# Patient Record
Sex: MALE | Race: WHITE | NOT HISPANIC OR LATINO | Employment: FULL TIME | URBAN - METROPOLITAN AREA
[De-identification: names, ages, dates, MRNs, and addresses within clinical notes are randomized per-mention and may not be internally consistent; named-entity substitution may affect disease eponyms.]

---

## 2017-11-09 ENCOUNTER — TREATMENT (OUTPATIENT)
Dept: PHYSICAL THERAPY | Facility: CLINIC | Age: 47
End: 2017-11-09

## 2017-11-09 DIAGNOSIS — Z02.1 PRE-EMPLOYMENT EXAMINATION: Primary | ICD-10-CM

## 2017-11-09 PROCEDURE — PDS: Performed by: PHYSICAL THERAPIST

## 2019-06-04 ENCOUNTER — HOSPITAL ENCOUNTER (OUTPATIENT)
Dept: GENERAL RADIOLOGY | Facility: HOSPITAL | Age: 49
Discharge: HOME | End: 2019-06-04
Admitting: PAIN MEDICINE

## 2019-06-04 ENCOUNTER — TRANSCRIBE ORDERS (OUTPATIENT)
Dept: ADMINISTRATIVE | Facility: HOSPITAL | Age: 49
End: 2019-06-04

## 2019-06-04 DIAGNOSIS — M54.2 CERVICALGIA: Primary | ICD-10-CM

## 2019-06-04 DIAGNOSIS — M54.2 CERVICALGIA: ICD-10-CM

## 2019-06-04 PROCEDURE — 72040 X-RAY EXAM NECK SPINE 2-3 VW: CPT

## 2019-06-05 ENCOUNTER — TREATMENT (OUTPATIENT)
Dept: PHYSICAL THERAPY | Facility: CLINIC | Age: 49
End: 2019-06-05

## 2019-06-05 DIAGNOSIS — M54.2 PAIN, NECK: Primary | ICD-10-CM

## 2019-06-05 DIAGNOSIS — R29.3 POSTURE IMBALANCE: ICD-10-CM

## 2019-06-05 PROCEDURE — 97162 PT EVAL MOD COMPLEX 30 MIN: CPT | Performed by: PHYSICAL THERAPIST

## 2019-06-05 PROCEDURE — 97110 THERAPEUTIC EXERCISES: CPT | Performed by: PHYSICAL THERAPIST
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
## 2019-06-13 ENCOUNTER — TREATMENT (OUTPATIENT)
Dept: PHYSICAL THERAPY | Facility: CLINIC | Age: 49
End: 2019-06-13

## 2019-06-13 DIAGNOSIS — M54.2 PAIN, NECK: Primary | ICD-10-CM

## 2019-06-13 DIAGNOSIS — R29.3 POSTURE IMBALANCE: ICD-10-CM

## 2019-06-13 PROCEDURE — 97035 APP MDLTY 1+ULTRASOUND EA 15: CPT | Performed by: PHYSICAL THERAPIST

## 2019-06-13 PROCEDURE — 97110 THERAPEUTIC EXERCISES: CPT | Performed by: PHYSICAL THERAPIST

## 2019-06-13 NOTE — PROGRESS NOTES
Physical Therapy Daily Progress Note  Visit: 2    Santhosh Pope reports: I felt better after the evaluation. Feeling about the same now    Subjective     Objective   See Exercise, Manual, and Modality Logs for complete treatment.       Assessment & Plan     Assessment  Assessment details: Pt reported relief after session. Added cervical isometrics and side arcs to HEP    Plan  Plan details: Progress with OA nods on ball and cervical rotation next session        Manual Therapy:    -     mins  12788;  Therapeutic Exercise:    29     mins  19472;     Neuromuscular Sarah:    -    mins  57622;    Therapeutic Activity:     -     mins  01124;     Gait Training:      -     mins  94294;     Ultrasound:     10     mins  98626;    Electrical Stimulation:    -     mins  45602 ( );  Dry Needling     -     mins self-pay    Timed Treatment:   39   mins   Total Treatment:     45   mins    Abena Bell PT  [place] License #: [id number]    Physical Therapist

## 2021-03-16 ENCOUNTER — HOSPITAL ENCOUNTER (OUTPATIENT)
Dept: CARDIOLOGY | Facility: HOSPITAL | Age: 51
Discharge: HOME | End: 2021-03-16

## 2021-03-16 VITALS
DIASTOLIC BLOOD PRESSURE: 85 MMHG | HEIGHT: 74 IN | WEIGHT: 195 LBS | OXYGEN SATURATION: 98 % | BODY MASS INDEX: 25.03 KG/M2 | SYSTOLIC BLOOD PRESSURE: 141 MMHG | TEMPERATURE: 97.3 F | HEART RATE: 67 BPM

## 2021-03-16 VITALS
WEIGHT: 195.11 LBS | BODY MASS INDEX: 25.04 KG/M2 | HEART RATE: 67 BPM | DIASTOLIC BLOOD PRESSURE: 85 MMHG | HEIGHT: 74 IN | SYSTOLIC BLOOD PRESSURE: 141 MMHG | OXYGEN SATURATION: 98 %

## 2021-03-16 DIAGNOSIS — R07.89 OTHER CHEST PAIN: Primary | ICD-10-CM

## 2021-03-16 DIAGNOSIS — R06.02 SHORTNESS OF BREATH: ICD-10-CM

## 2021-03-16 DIAGNOSIS — R07.89 OTHER CHEST PAIN: ICD-10-CM

## 2021-03-16 LAB
ALBUMIN SERPL-MCNC: 4.5 G/DL (ref 3.5–5.2)
ALBUMIN/GLOB SERPL: 1.6 G/DL
ALP SERPL-CCNC: 76 U/L (ref 39–117)
ALT SERPL W P-5'-P-CCNC: 25 U/L (ref 1–41)
ANION GAP SERPL CALCULATED.3IONS-SCNC: 8 MMOL/L (ref 5–15)
AORTIC ARCH: 2.8 CM
ASCENDING AORTA: 3.1 CM
AST SERPL-CCNC: 20 U/L (ref 1–40)
BASOPHILS # BLD AUTO: 0.05 10*3/MM3 (ref 0–0.2)
BASOPHILS NFR BLD AUTO: 0.5 % (ref 0–1.5)
BH CV ECHO MEAS - ACS: 2.1 CM
BH CV ECHO MEAS - AO MAX PG (FULL): 0.75 MMHG
BH CV ECHO MEAS - AO MAX PG: 10.1 MMHG
BH CV ECHO MEAS - AO MEAN PG (FULL): 0 MMHG
BH CV ECHO MEAS - AO MEAN PG: 5 MMHG
BH CV ECHO MEAS - AO ROOT AREA (BSA CORRECTED): 1.6
BH CV ECHO MEAS - AO ROOT AREA: 9.6 CM^2
BH CV ECHO MEAS - AO ROOT DIAM: 3.5 CM
BH CV ECHO MEAS - AO V2 MAX: 159 CM/SEC
BH CV ECHO MEAS - AO V2 MEAN: 99 CM/SEC
BH CV ECHO MEAS - AO V2 VTI: 28.5 CM
BH CV ECHO MEAS - ASC AORTA: 3.1 CM
BH CV ECHO MEAS - AVA(I,A): 4.6 CM^2
BH CV ECHO MEAS - AVA(I,D): 4.6 CM^2
BH CV ECHO MEAS - AVA(V,A): 4.4 CM^2
BH CV ECHO MEAS - AVA(V,D): 4.4 CM^2
BH CV ECHO MEAS - BSA(HAYCOCK): 2.2 M^2
BH CV ECHO MEAS - BSA: 2.1 M^2
BH CV ECHO MEAS - BZI_BMI: 25 KILOGRAMS/M^2
BH CV ECHO MEAS - BZI_METRIC_HEIGHT: 188 CM
BH CV ECHO MEAS - BZI_METRIC_WEIGHT: 88.5 KG
BH CV ECHO MEAS - EDV(MOD-SP2): 113 ML
BH CV ECHO MEAS - EDV(MOD-SP4): 163 ML
BH CV ECHO MEAS - EDV(TEICH): 74.2 ML
BH CV ECHO MEAS - EF(CUBED): 84.6 %
BH CV ECHO MEAS - EF(MOD-BP): 67.4 %
BH CV ECHO MEAS - EF(MOD-SP2): 61.1 %
BH CV ECHO MEAS - EF(MOD-SP4): 72.4 %
BH CV ECHO MEAS - EF(TEICH): 78.2 %
BH CV ECHO MEAS - ESV(MOD-SP2): 44 ML
BH CV ECHO MEAS - ESV(MOD-SP4): 45 ML
BH CV ECHO MEAS - ESV(TEICH): 16.2 ML
BH CV ECHO MEAS - FS: 46.3 %
BH CV ECHO MEAS - IVS/LVPW: 1.1
BH CV ECHO MEAS - IVSD: 1.5 CM
BH CV ECHO MEAS - LAT PEAK E' VEL: 5.8 CM/SEC
BH CV ECHO MEAS - LV DIASTOLIC VOL/BSA (35-75): 75.8 ML/M^2
BH CV ECHO MEAS - LV MASS(C)D: 228.6 GRAMS
BH CV ECHO MEAS - LV MASS(C)DI: 106.3 GRAMS/M^2
BH CV ECHO MEAS - LV MAX PG: 9.4 MMHG
BH CV ECHO MEAS - LV MEAN PG: 5 MMHG
BH CV ECHO MEAS - LV SYSTOLIC VOL/BSA (12-30): 20.9 ML/M^2
BH CV ECHO MEAS - LV V1 MAX: 153 CM/SEC
BH CV ECHO MEAS - LV V1 MEAN: 102 CM/SEC
BH CV ECHO MEAS - LV V1 VTI: 28.9 CM
BH CV ECHO MEAS - LVIDD: 4.1 CM
BH CV ECHO MEAS - LVIDS: 2.2 CM
BH CV ECHO MEAS - LVLD AP2: 9.4 CM
BH CV ECHO MEAS - LVLD AP4: 9.6 CM
BH CV ECHO MEAS - LVLS AP2: 7.6 CM
BH CV ECHO MEAS - LVLS AP4: 7.5 CM
BH CV ECHO MEAS - LVOT AREA (M): 4.5 CM^2
BH CV ECHO MEAS - LVOT AREA: 4.5 CM^2
BH CV ECHO MEAS - LVOT DIAM: 2.4 CM
BH CV ECHO MEAS - LVPWD: 1.4 CM
BH CV ECHO MEAS - MED PEAK E' VEL: 5.6 CM/SEC
BH CV ECHO MEAS - MV A DUR: 0.11 SEC
BH CV ECHO MEAS - MV A MAX VEL: 67.4 CM/SEC
BH CV ECHO MEAS - MV DEC SLOPE: 419 CM/SEC^2
BH CV ECHO MEAS - MV DEC TIME: 0.17 SEC
BH CV ECHO MEAS - MV E MAX VEL: 72.3 CM/SEC
BH CV ECHO MEAS - MV E/A: 1.1
BH CV ECHO MEAS - MV MAX PG: 2.7 MMHG
BH CV ECHO MEAS - MV MEAN PG: 1 MMHG
BH CV ECHO MEAS - MV P1/2T MAX VEL: 85.5 CM/SEC
BH CV ECHO MEAS - MV P1/2T: 59.8 MSEC
BH CV ECHO MEAS - MV V2 MAX: 81.8 CM/SEC
BH CV ECHO MEAS - MV V2 MEAN: 50.4 CM/SEC
BH CV ECHO MEAS - MV V2 VTI: 23.8 CM
BH CV ECHO MEAS - MVA P1/2T LCG: 2.6 CM^2
BH CV ECHO MEAS - MVA(P1/2T): 3.7 CM^2
BH CV ECHO MEAS - MVA(VTI): 5.5 CM^2
BH CV ECHO MEAS - PA ACC TIME: 0.14 SEC
BH CV ECHO MEAS - PA MAX PG (FULL): 1.1 MMHG
BH CV ECHO MEAS - PA MAX PG: 7.2 MMHG
BH CV ECHO MEAS - PA PR(ACCEL): 17.4 MMHG
BH CV ECHO MEAS - PA V2 MAX: 134 CM/SEC
BH CV ECHO MEAS - PVA(V,A): 4.2 CM^2
BH CV ECHO MEAS - PVA(V,D): 4.2 CM^2
BH CV ECHO MEAS - QP/QS: 0.89
BH CV ECHO MEAS - RAP SYSTOLE: 8 MMHG
BH CV ECHO MEAS - RV MAX PG: 6.1 MMHG
BH CV ECHO MEAS - RV MEAN PG: 4 MMHG
BH CV ECHO MEAS - RV V1 MAX: 123 CM/SEC
BH CV ECHO MEAS - RV V1 MEAN: 91 CM/SEC
BH CV ECHO MEAS - RV V1 VTI: 25.7 CM
BH CV ECHO MEAS - RVOT AREA: 4.5 CM^2
BH CV ECHO MEAS - RVOT DIAM: 2.4 CM
BH CV ECHO MEAS - SI(AO): 127.5 ML/M^2
BH CV ECHO MEAS - SI(CUBED): 27.1 ML/M^2
BH CV ECHO MEAS - SI(LVOT): 60.8 ML/M^2
BH CV ECHO MEAS - SI(MOD-SP2): 32.1 ML/M^2
BH CV ECHO MEAS - SI(MOD-SP4): 54.9 ML/M^2
BH CV ECHO MEAS - SI(TEICH): 27 ML/M^2
BH CV ECHO MEAS - SUP REN AO DIAM: 2.9 CM
BH CV ECHO MEAS - SV(AO): 274.2 ML
BH CV ECHO MEAS - SV(CUBED): 58.3 ML
BH CV ECHO MEAS - SV(LVOT): 130.7 ML
BH CV ECHO MEAS - SV(MOD-SP2): 69 ML
BH CV ECHO MEAS - SV(MOD-SP4): 118 ML
BH CV ECHO MEAS - SV(RVOT): 116.3 ML
BH CV ECHO MEAS - SV(TEICH): 58 ML
BH CV ECHO MEAS - TAPSE (>1.6): 2 CM
BH CV ECHO MEASUREMENTS AVERAGE E/E' RATIO: 12.68
BH CV XLRA - RV BASE: 3.5 CM
BH CV XLRA - RV LENGTH: 7.1 CM
BH CV XLRA - RV MID: 2.3 CM
BH CV XLRA - TDI S': 11 CM/SEC
BILIRUB SERPL-MCNC: 0.2 MG/DL (ref 0–1.2)
BUN SERPL-MCNC: 13 MG/DL (ref 6–20)
BUN/CREAT SERPL: 14.9 (ref 7–25)
CALCIUM SPEC-SCNC: 9 MG/DL (ref 8.6–10.5)
CHLORIDE SERPL-SCNC: 105 MMOL/L (ref 98–107)
CO2 SERPL-SCNC: 28 MMOL/L (ref 22–29)
CREAT SERPL-MCNC: 0.87 MG/DL (ref 0.76–1.27)
D DIMER PPP FEU-MCNC: 0.31 MCGFEU/ML (ref 0–0.49)
DEPRECATED RDW RBC AUTO: 40 FL (ref 37–54)
EOSINOPHIL # BLD AUTO: 0.19 10*3/MM3 (ref 0–0.4)
EOSINOPHIL NFR BLD AUTO: 2 % (ref 0.3–6.2)
ERYTHROCYTE [DISTWIDTH] IN BLOOD BY AUTOMATED COUNT: 13 % (ref 12.3–15.4)
GFR SERPL CREATININE-BSD FRML MDRD: 93 ML/MIN/1.73
GLOBULIN UR ELPH-MCNC: 2.8 GM/DL
GLUCOSE SERPL-MCNC: 94 MG/DL (ref 65–99)
HCT VFR BLD AUTO: 39.8 % (ref 37.5–51)
HGB BLD-MCNC: 14.1 G/DL (ref 13–17.7)
IMM GRANULOCYTES # BLD AUTO: 0.04 10*3/MM3 (ref 0–0.05)
IMM GRANULOCYTES NFR BLD AUTO: 0.4 % (ref 0–0.5)
LEFT ATRIUM VOLUME INDEX: 27 ML/M2
LYMPHOCYTES # BLD AUTO: 1.7 10*3/MM3 (ref 0.7–3.1)
LYMPHOCYTES NFR BLD AUTO: 18.1 % (ref 19.6–45.3)
MAXIMAL PREDICTED HEART RATE: 169 BPM
MCH RBC QN AUTO: 30.3 PG (ref 26.6–33)
MCHC RBC AUTO-ENTMCNC: 35.4 G/DL (ref 31.5–35.7)
MCV RBC AUTO: 85.6 FL (ref 79–97)
MONOCYTES # BLD AUTO: 0.59 10*3/MM3 (ref 0.1–0.9)
MONOCYTES NFR BLD AUTO: 6.3 % (ref 5–12)
NEUTROPHILS NFR BLD AUTO: 6.8 10*3/MM3 (ref 1.7–7)
NEUTROPHILS NFR BLD AUTO: 72.7 % (ref 42.7–76)
NRBC BLD AUTO-RTO: 0 /100 WBC (ref 0–0.2)
NT-PROBNP SERPL-MCNC: 99.4 PG/ML (ref 0–900)
PLATELET # BLD AUTO: 278 10*3/MM3 (ref 140–450)
PMV BLD AUTO: 9.8 FL (ref 6–12)
POTASSIUM SERPL-SCNC: 4.1 MMOL/L (ref 3.5–5.2)
PROT SERPL-MCNC: 7.3 G/DL (ref 6–8.5)
RBC # BLD AUTO: 4.65 10*6/MM3 (ref 4.14–5.8)
SINUS: 2.9 CM
SODIUM SERPL-SCNC: 141 MMOL/L (ref 136–145)
STJ: 2.7 CM
STRESS TARGET HR: 144 BPM
TROPONIN T SERPL-MCNC: <0.01 NG/ML (ref 0–0.03)
WBC # BLD AUTO: 9.37 10*3/MM3 (ref 3.4–10.8)

## 2021-03-16 PROCEDURE — 93306 TTE W/DOPPLER COMPLETE: CPT

## 2021-03-16 PROCEDURE — 83880 ASSAY OF NATRIURETIC PEPTIDE: CPT | Performed by: INTERNAL MEDICINE

## 2021-03-16 PROCEDURE — 36415 COLL VENOUS BLD VENIPUNCTURE: CPT

## 2021-03-16 PROCEDURE — 93306 TTE W/DOPPLER COMPLETE: CPT | Performed by: INTERNAL MEDICINE

## 2021-03-16 PROCEDURE — 85025 COMPLETE CBC W/AUTO DIFF WBC: CPT

## 2021-03-16 PROCEDURE — 85379 FIBRIN DEGRADATION QUANT: CPT | Performed by: INTERNAL MEDICINE

## 2021-03-16 PROCEDURE — 99204 OFFICE O/P NEW MOD 45 MIN: CPT | Performed by: INTERNAL MEDICINE

## 2021-03-16 PROCEDURE — 25010000002 PERFLUTREN (DEFINITY) 8.476 MG IN SODIUM CHLORIDE (PF) 0.9 % 10 ML INJECTION: Performed by: INTERNAL MEDICINE

## 2021-03-16 PROCEDURE — 93356 MYOCRD STRAIN IMG SPCKL TRCK: CPT | Performed by: INTERNAL MEDICINE

## 2021-03-16 PROCEDURE — 84484 ASSAY OF TROPONIN QUANT: CPT | Performed by: INTERNAL MEDICINE

## 2021-03-16 PROCEDURE — 93356 MYOCRD STRAIN IMG SPCKL TRCK: CPT

## 2021-03-16 PROCEDURE — 93005 ELECTROCARDIOGRAM TRACING: CPT | Performed by: INTERNAL MEDICINE

## 2021-03-16 PROCEDURE — 80053 COMPREHEN METABOLIC PANEL: CPT

## 2021-03-16 RX ORDER — ATORVASTATIN CALCIUM 40 MG/1
40 TABLET, FILM COATED ORAL DAILY
COMMUNITY
End: 2022-12-08

## 2021-03-16 RX ORDER — SODIUM CHLORIDE 0.9 % (FLUSH) 0.9 %
10 SYRINGE (ML) INJECTION AS NEEDED
Status: SHIPPED | OUTPATIENT
Start: 2021-03-16

## 2021-03-16 RX ORDER — NITROGLYCERIN 0.4 MG/1
0.4 TABLET SUBLINGUAL
Status: SHIPPED | OUTPATIENT
Start: 2021-03-16

## 2021-03-16 RX ORDER — OMEPRAZOLE 40 MG/1
40 CAPSULE, DELAYED RELEASE ORAL DAILY
COMMUNITY

## 2021-03-16 RX ORDER — LOSARTAN POTASSIUM 100 MG/1
50 TABLET ORAL DAILY
COMMUNITY
End: 2022-06-28

## 2021-03-16 RX ADMIN — PERFLUTREN 2 ML: 6.52 INJECTION, SUSPENSION INTRAVENOUS at 14:20

## 2021-03-16 NOTE — PROGRESS NOTES
PATIENTINFORMATION    Date of Office Visit: 2021  Encounter Provider: NANNETTE CPU  Place of Service: [facility] CARD CARE  Patient Name: Santhosh Pope  : 1970    Subjective:     Encounter Date:2021      Patient ID: Santhosh Pope is a 50 y.o. male.    Chief Complaint   Patient presents with   • Chest Pain   • Shortness of Breath     HPI  Mr. Pope is a 50 years old man with past medical history of hypertension and hyperlipidemia sent from Dr. Kim office for evaluation of chest tightness and abnormal EKG.  Patient reports that he has had intermittent episodes of chest tightness that he describes more of as shortness of breath for the past several weeks and months.  He denied tightness being painful or hurting.  Symptoms are mostly constant and he denies any exacerbation on exertion and he reports having some mild cough along with that.  He denied any palpitations, presyncope or syncope, orthopnea, PND or extremity swelling.  He denied any presyncope or syncope.    He has family history of premature coronary artery disease and hyperlipidemia and himself has been on antihypertensive and statin for a while now.      ROS   All systems reviewed and negative except as noted in HPI.    Past Medical History:   Diagnosis Date   • Abnormal ECG    • Allergic    • Anxiety    • Headache    • Heart murmur    • Hyperlipidemia    • Hypertension        Past Surgical History:   Procedure Laterality Date   • BRAIN SURGERY      When he was 18 y/o   • COLONOSCOPY     • ESOPHAGOSCOPY / EGD      x 2    • EYE SURGERY      x2   • LEG SURGERY     • STOMACH SURGERY         Social History     Socioeconomic History   • Marital status:      Spouse name: Not on file   • Number of children: Not on file   • Years of education: Not on file   • Highest education level: Not on file   Tobacco Use   • Smoking status: Former Smoker     Packs/day: 1.00     Years: 19.00     Pack years: 19.00     Types:  "Cigarettes     Start date: 1987     Quit date: 2006     Years since quitting: 15.2   Vaping Use   • Vaping Use: Never used   Substance and Sexual Activity   • Alcohol use: Yes     Alcohol/week: 1.0 standard drinks     Types: 1 Cans of beer per week     Comment: Socially   • Drug use: No   • Sexual activity: Yes       Family History   Problem Relation Age of Onset   • Cancer Mother    • Lupus Mother    • Heart disease Father    • Cancer Father    • Heart disease Brother            ECG 12 Lead    Date/Time: 3/16/2021 3:02 PM  Performed by: Max Husain MD  Authorized by: Max Husain MD   Comparison: compared with previous ECG from 3/16/2021  Similar to previous ECG  Rhythm: sinus rhythm  Rate: normal  Conduction: conduction normal  ST Segments: ST segments normal  T Waves: T waves normal  T inversion: V6, V5, V4, I and aVL  QRS axis: normal  Other: no other findings    Clinical impression: abnormal EKG               Objective:     /85 (BP Location: Right arm, Patient Position: Sitting)   Pulse 67   Temp 97.3 °F (36.3 °C) (Oral)   Ht 188 cm (74\")   Wt 88.5 kg (195 lb)   SpO2 98%   BMI 25.04 kg/m²  Body mass index is 25.04 kg/m².     Constitutional:       General: Not in acute distress.     Appearance: Well-developed. Not diaphoretic.   Eyes:      Pupils: Pupils are equal, round, and reactive to light.   HENT:      Head: Normocephalic and atraumatic.   Neck:      Thyroid: No thyromegaly.   Pulmonary:      Effort: Pulmonary effort is normal. No respiratory distress.      Breath sounds: Normal breath sounds. No wheezing. No rales.   Chest:      Chest wall: Not tender to palpatation.   Cardiovascular:      Normal rate. Regular rhythm.      Murmurs: There is a early systolic murmur at the LLSB.      No gallop.   Pulses:     Intact distal pulses.   Edema:     Peripheral edema absent.   Abdominal:      General: Bowel sounds are normal. There is no distension.      Palpations: Abdomen is soft.    "   Tenderness: There is no guarding.   Musculoskeletal: Normal range of motion.         General: No deformity.      Cervical back: Normal range of motion and neck supple. Skin:     General: Skin is warm and dry.      Findings: No rash.   Neurological:      Mental Status: Alert and oriented to person, place, and time.      Cranial Nerves: No cranial nerve deficit.      Deep Tendon Reflexes: Reflexes are normal and symmetric.   Psychiatric:         Judgment: Judgment normal.         Review Of Data:       Assessment/Plan:       1.  Chest tightness/shortness of breath  -Patient denies symptoms being exertional and looks like constant-EKG significant for T wave inversions on V4-V6   2.  Hypertension- fairly controlled   3.  Hyperlipidemia  4.  Family history of premature coronary artery disease    Patient had a negative troponin and his labs are unremarkable otherwise.    Patient had echocardiogram done today that was significant for concentric remodeling of the left ventricle and some mild regional wall motion abnormality involving the basal inferior and inferoseptal wall but no significant valvular pathology noted.  Because of risks of coronary artery disease I will go ahead and do myocardial perfusion study and in the meantime continue taking his antihypertensive and statin regimen.        Diagnosis and plan of care discussed with patient and verbalized understanding.           Max Husain MD  03/16/21  14:58 EDT

## 2021-03-18 ENCOUNTER — TRANSCRIBE ORDERS (OUTPATIENT)
Dept: SLEEP MEDICINE | Facility: HOSPITAL | Age: 51
End: 2021-03-18

## 2021-03-18 DIAGNOSIS — Z01.818 OTHER SPECIFIED PRE-OPERATIVE EXAMINATION: Primary | ICD-10-CM

## 2021-03-20 ENCOUNTER — LAB (OUTPATIENT)
Dept: LAB | Facility: HOSPITAL | Age: 51
End: 2021-03-20

## 2021-03-20 DIAGNOSIS — Z01.818 OTHER SPECIFIED PRE-OPERATIVE EXAMINATION: ICD-10-CM

## 2021-03-20 PROCEDURE — C9803 HOPD COVID-19 SPEC COLLECT: HCPCS

## 2021-03-20 PROCEDURE — U0004 COV-19 TEST NON-CDC HGH THRU: HCPCS

## 2021-03-22 LAB — SARS-COV-2 RNA RESP QL NAA+PROBE: ABNORMAL

## 2021-03-23 ENCOUNTER — HOSPITAL ENCOUNTER (OUTPATIENT)
Dept: CARDIOLOGY | Facility: HOSPITAL | Age: 51
Discharge: HOME | End: 2021-03-23
Admitting: INTERNAL MEDICINE

## 2021-03-23 VITALS — HEIGHT: 74 IN | WEIGHT: 195.11 LBS | BODY MASS INDEX: 25.04 KG/M2

## 2021-03-23 DIAGNOSIS — R06.02 SHORTNESS OF BREATH: ICD-10-CM

## 2021-03-23 LAB
BH CV NUCLEAR PRIOR STUDY: 2
BH CV REST NUCLEAR ISOTOPE DOSE: 10.5 MCI
BH CV STRESS BP STAGE 1: NORMAL
BH CV STRESS BP STAGE 2: NORMAL
BH CV STRESS BP STAGE 3: NORMAL
BH CV STRESS DURATION MIN STAGE 1: 3
BH CV STRESS DURATION MIN STAGE 2: 3
BH CV STRESS DURATION MIN STAGE 3: 3
BH CV STRESS DURATION SEC STAGE 1: 0
BH CV STRESS DURATION SEC STAGE 2: 0
BH CV STRESS DURATION SEC STAGE 3: 0
BH CV STRESS DURATION SEC STAGE 4: 30
BH CV STRESS GRADE STAGE 1: 10
BH CV STRESS GRADE STAGE 2: 12
BH CV STRESS GRADE STAGE 3: 14
BH CV STRESS GRADE STAGE 4: 16
BH CV STRESS HR STAGE 1: 103
BH CV STRESS HR STAGE 2: 132
BH CV STRESS HR STAGE 3: 146
BH CV STRESS HR STAGE 4: 150
BH CV STRESS METS STAGE 1: 5
BH CV STRESS METS STAGE 2: 7.5
BH CV STRESS METS STAGE 3: 10
BH CV STRESS METS STAGE 4: 13.5
BH CV STRESS NUCLEAR ISOTOPE DOSE: 34.5 MCI
BH CV STRESS PROTOCOL 1: NORMAL
BH CV STRESS RECOVERY BP: NORMAL MMHG
BH CV STRESS RECOVERY HR: 86 BPM
BH CV STRESS SPEED STAGE 1: 1.7
BH CV STRESS SPEED STAGE 2: 2.5
BH CV STRESS SPEED STAGE 3: 3.4
BH CV STRESS SPEED STAGE 4: 4.2
BH CV STRESS STAGE 1: 1
BH CV STRESS STAGE 2: 2
BH CV STRESS STAGE 3: 3
BH CV STRESS STAGE 4: 4
LV EF NUC BP: 45 %
MAXIMAL PREDICTED HEART RATE: 168 BPM
PERCENT MAX PREDICTED HR: 89.29 %
STRESS BASELINE BP: NORMAL MMHG
STRESS BASELINE HR: 75 BPM
STRESS PERCENT HR: 105 %
STRESS POST ESTIMATED WORKLOAD: 11 METS
STRESS POST EXERCISE DUR MIN: 9 MIN
STRESS POST EXERCISE DUR SEC: 30 SEC
STRESS POST PEAK BP: NORMAL MMHG
STRESS POST PEAK HR: 150 BPM
STRESS TARGET HR: 143 BPM

## 2021-03-23 PROCEDURE — 93017 CV STRESS TEST TRACING ONLY: CPT

## 2021-03-23 PROCEDURE — 78452 HT MUSCLE IMAGE SPECT MULT: CPT | Performed by: INTERNAL MEDICINE

## 2021-03-23 PROCEDURE — 93016 CV STRESS TEST SUPVJ ONLY: CPT | Performed by: INTERNAL MEDICINE

## 2021-03-23 PROCEDURE — A9502 TC99M TETROFOSMIN: HCPCS | Performed by: INTERNAL MEDICINE

## 2021-03-23 PROCEDURE — 78452 HT MUSCLE IMAGE SPECT MULT: CPT

## 2021-03-23 PROCEDURE — 0 TECHNETIUM TETROFOSMIN KIT: Performed by: INTERNAL MEDICINE

## 2021-03-23 PROCEDURE — 93018 CV STRESS TEST I&R ONLY: CPT | Performed by: INTERNAL MEDICINE

## 2021-03-23 RX ADMIN — TETROFOSMIN 1 DOSE: 1.38 INJECTION, POWDER, LYOPHILIZED, FOR SOLUTION INTRAVENOUS at 08:38

## 2021-03-23 RX ADMIN — TETROFOSMIN 1 DOSE: 1.38 INJECTION, POWDER, LYOPHILIZED, FOR SOLUTION INTRAVENOUS at 07:53

## 2021-03-24 ENCOUNTER — TELEPHONE (OUTPATIENT)
Dept: CARDIOLOGY | Facility: CLINIC | Age: 51
End: 2021-03-24

## 2021-03-24 DIAGNOSIS — R29.818 SUSPECTED SLEEP APNEA: Primary | ICD-10-CM

## 2021-03-24 DIAGNOSIS — Z13.6 SCREENING FOR CARDIOVASCULAR CONDITION: ICD-10-CM

## 2021-04-20 ENCOUNTER — APPOINTMENT (OUTPATIENT)
Dept: CT IMAGING | Facility: HOSPITAL | Age: 51
End: 2021-04-20

## 2021-04-21 ENCOUNTER — APPOINTMENT (OUTPATIENT)
Dept: SLEEP MEDICINE | Facility: HOSPITAL | Age: 51
End: 2021-04-21

## 2021-07-27 ENCOUNTER — LAB (OUTPATIENT)
Dept: LAB | Facility: HOSPITAL | Age: 51
End: 2021-07-27

## 2021-07-27 ENCOUNTER — OFFICE VISIT (OUTPATIENT)
Dept: CARDIOLOGY | Facility: CLINIC | Age: 51
End: 2021-07-27

## 2021-07-27 VITALS
BODY MASS INDEX: 24.77 KG/M2 | DIASTOLIC BLOOD PRESSURE: 90 MMHG | OXYGEN SATURATION: 99 % | SYSTOLIC BLOOD PRESSURE: 112 MMHG | HEIGHT: 74 IN | HEART RATE: 74 BPM | WEIGHT: 193 LBS

## 2021-07-27 DIAGNOSIS — R01.1 HEART MURMUR: Primary | ICD-10-CM

## 2021-07-27 DIAGNOSIS — R06.02 EXERTIONAL SHORTNESS OF BREATH: ICD-10-CM

## 2021-07-27 DIAGNOSIS — R07.9 CHEST PAIN WITH HIGH RISK FOR CARDIAC ETIOLOGY: ICD-10-CM

## 2021-07-27 LAB
ANION GAP SERPL CALCULATED.3IONS-SCNC: 13.2 MMOL/L (ref 5–15)
BUN SERPL-MCNC: 19 MG/DL (ref 6–20)
BUN/CREAT SERPL: 16.2 (ref 7–25)
CALCIUM SPEC-SCNC: 9.1 MG/DL (ref 8.6–10.5)
CHLORIDE SERPL-SCNC: 100 MMOL/L (ref 98–107)
CO2 SERPL-SCNC: 27.8 MMOL/L (ref 22–29)
CREAT SERPL-MCNC: 1.17 MG/DL (ref 0.76–1.27)
GFR SERPL CREATININE-BSD FRML MDRD: 65 ML/MIN/1.73
GLUCOSE SERPL-MCNC: 120 MG/DL (ref 65–99)
NT-PROBNP SERPL-MCNC: 72.7 PG/ML (ref 0–900)
POTASSIUM SERPL-SCNC: 4.2 MMOL/L (ref 3.5–5.2)
SODIUM SERPL-SCNC: 141 MMOL/L (ref 136–145)

## 2021-07-27 PROCEDURE — 83880 ASSAY OF NATRIURETIC PEPTIDE: CPT

## 2021-07-27 PROCEDURE — 99214 OFFICE O/P EST MOD 30 MIN: CPT | Performed by: INTERNAL MEDICINE

## 2021-07-27 PROCEDURE — 93000 ELECTROCARDIOGRAM COMPLETE: CPT | Performed by: INTERNAL MEDICINE

## 2021-07-27 PROCEDURE — 80048 BASIC METABOLIC PNL TOTAL CA: CPT

## 2021-07-27 PROCEDURE — 36415 COLL VENOUS BLD VENIPUNCTURE: CPT

## 2021-07-27 RX ORDER — PRAVASTATIN SODIUM 40 MG
TABLET ORAL
COMMUNITY
End: 2021-09-14

## 2021-07-27 RX ORDER — HYDROCODONE BITARTRATE AND ACETAMINOPHEN 7.5; 325 MG/1; MG/1
TABLET ORAL EVERY 6 HOURS
COMMUNITY
End: 2021-09-14

## 2021-07-27 RX ORDER — CHLORTHALIDONE 25 MG/1
TABLET ORAL
COMMUNITY
Start: 2021-07-06 | End: 2021-08-12

## 2021-07-27 NOTE — PROGRESS NOTES
PATIENTINFORMATION    Date of Office Visit: 2021  Encounter Provider: Max Husain MD  Place of Service: [facility] CARDIOLOGY  Patient Name: Santhosh Pope  : 1970    Subjective:     Encounter Date:2021      Patient ID: Santhosh Pope is a 51 y.o. male.    Chief Complaint   Patient presents with   • Chest Pain     4 months    • Shortness of Breath   • Hypertension   • Hyperlipidemia     HPI  Mr. Pope years old man with past medical history of hypertension and hyperlipidemia whom I saw a few months ago in the [facility] [place] of chest discomfort.  Subsequently he had echocardiogram and myocardial perfusion study.  Echo was significant for concentric remodeling but no significant valvular abnormalities despite patient having loud systolic murmur in the left lower sternal border and apical area.  Myocardial perfusion study was negative for myocardial ischemia.  He was subsequently released with follow-up appointment.  Because of worsening shortness of breath and some abdominal bloating his PCP started him on chlorthalidone after which patient reports some improvement of shortness of breath but he restarted having that over the past few weeks along with evening retrosternal chest discomfort while seated that he describes as pressure.  Sometimes he gets similar discomfort/shortness of breath during exertional activities like mowing.  He is very active at home work walking several miles without any significant discomfort.  He denied any significant orthopnea, PND, palpitations, presyncope syncope, significant extremity swelling.  He is compliant with his medications and reports good blood pressure control at home on losartan and chlorthalidone.  He is also on  atorvastatin    ROS   All systems reviewed and negative except as noted in HPI.    Past Medical History:   Diagnosis Date   • Abnormal ECG    • Allergic    • Anxiety    • Headache    • Heart murmur    •  "Hyperlipidemia    • Hypertension        Past Surgical History:   Procedure Laterality Date   • BRAIN SURGERY      When he was 16 y/o   • COLONOSCOPY     • ESOPHAGOSCOPY / EGD      x 2    • EYE SURGERY      x2   • LEG SURGERY     • STOMACH SURGERY         Social History     Socioeconomic History   • Marital status:      Spouse name: Not on file   • Number of children: Not on file   • Years of education: Not on file   • Highest education level: Not on file   Tobacco Use   • Smoking status: Former Smoker     Packs/day: 1.00     Years: 19.00     Pack years: 19.00     Types: Cigarettes     Start date: 1987     Quit date: 2006     Years since quitting: 15.5   • Smokeless tobacco: Never Used   Vaping Use   • Vaping Use: Never used   Substance and Sexual Activity   • Alcohol use: Yes     Alcohol/week: 1.0 standard drinks     Types: 1 Cans of beer per week     Comment: Socially   • Drug use: No   • Sexual activity: Yes       Family History   Problem Relation Age of Onset   • Cancer Mother    • Lupus Mother    • Heart disease Father    • Cancer Father    • Heart disease Brother            ECG 12 Lead    Date/Time: 7/27/2021 4:03 PM  Performed by: Max Husain MD  Authorized by: Max Husain MD   Comparison: compared with previous ECG from 3/16/2021  Similar to previous ECG  Rhythm: sinus rhythm  Rate: normal  Conduction: conduction normal  ST Segments: ST segments normal  T Waves: T waves normal  QRS axis: normal  Other findings: left ventricular hypertrophy with strain    Clinical impression: abnormal EKG               Objective:     /90   Pulse 74   Ht 188 cm (74\")   Wt 87.5 kg (193 lb)   SpO2 99%   BMI 24.78 kg/m²  Body mass index is 24.78 kg/m².     Constitutional:       General: Not in acute distress.     Appearance: Well-developed. Not diaphoretic.   Eyes:      Pupils: Pupils are equal, round, and reactive to light.   HENT:      Head: Normocephalic and atraumatic.   Neck:      " Thyroid: No thyromegaly.   Pulmonary:      Effort: Pulmonary effort is normal. No respiratory distress.      Breath sounds: Normal breath sounds. No wheezing. No rales.   Chest:      Chest wall: Not tender to palpatation.   Cardiovascular:      Normal rate. Regular rhythm.      Murmurs: There is a midsystolic murmur at the LLSB and apex.      No gallop.   Pulses:     Intact distal pulses.   Edema:     Peripheral edema absent.   Abdominal:      General: Bowel sounds are normal. There is no distension.      Palpations: Abdomen is soft.      Tenderness: There is no guarding.   Musculoskeletal: Normal range of motion.         General: No deformity.      Cervical back: Normal range of motion and neck supple. Skin:     General: Skin is warm and dry.      Findings: No rash.   Neurological:      Mental Status: Alert and oriented to person, place, and time.      Cranial Nerves: No cranial nerve deficit.      Deep Tendon Reflexes: Reflexes are normal and symmetric.   Psychiatric:         Judgment: Judgment normal.         Review Of Data:       Assessment/Plan:       1.  Chest discomfort-scribed as heaviness/pressure in the retrosternal area while seated in the evening and occasionally during exertion but not always  -EKG today significant for LVH with strain pattern  -Patient had normal myocardial perfusion study that was negative for ischemia in March 2020-EF reported 45%  -Echocardiogram significant for left ventricular concentric remodeling and turbulent flow in the LVOT no significant gradient noted.  Also noted was abnormal global left ventricular strain  -Because of ongoing symptoms I will have patient coronary CTA to rule out any significant obstructive proximal disease and also repeat echocardiogram as murmur sounds louder during exam today.  Get a proBNP and BMP.  -Start aspirin 81 mg p.o. daily  2.  Hypertension with hypertensive heart disease-fairly controlled  3.  Hyperlipidemia-on statin  4.  Family history of  premature coronary artery disease  -See plan above      Diagnosis and plan of care discussed with patient and verbalized understanding.           Max Husain MD  07/27/21  16:30 EDT

## 2021-07-28 ENCOUNTER — TELEPHONE (OUTPATIENT)
Dept: CARDIOLOGY | Facility: CLINIC | Age: 51
End: 2021-07-28

## 2021-07-28 NOTE — TELEPHONE ENCOUNTER
----- Message from Max Husain MD sent at 7/28/2021  7:44 AM EDT -----  I tried to call patient this morning but this number was not working.  Can you please notify patient electrolytes are within range.  Labs did not show strong evidence for volume overload and probably he does not have significant amount of fluid retention.. I will wait on results of echo and CTA before I make any further recommendations.  Let me know if he has any questions for me.  Thank you

## 2021-07-28 NOTE — PROGRESS NOTES
I tried to call patient this morning but this number was not working.  Can you please notify patient electrolytes are within range.  Labs did not show strong evidence for volume overload and probably he does not have significant amount of fluid retention.. I will wait on results of echo and CTA before I make any further recommendations.  Let me know if he has any questions for me.  Thank you

## 2021-07-28 NOTE — TELEPHONE ENCOUNTER
Patient notified of results and recommendations and verbalized understanding  Ave Alvarenga RN  Triage nurse

## 2021-08-10 ENCOUNTER — HOSPITAL ENCOUNTER (OUTPATIENT)
Dept: CARDIOLOGY | Facility: HOSPITAL | Age: 51
Discharge: HOME | End: 2021-08-10
Admitting: INTERNAL MEDICINE

## 2021-08-10 VITALS
HEART RATE: 75 BPM | DIASTOLIC BLOOD PRESSURE: 70 MMHG | SYSTOLIC BLOOD PRESSURE: 128 MMHG | HEIGHT: 74 IN | BODY MASS INDEX: 24.77 KG/M2 | WEIGHT: 193 LBS

## 2021-08-10 DIAGNOSIS — R01.1 HEART MURMUR: ICD-10-CM

## 2021-08-10 LAB
AORTIC ARCH: 2.7 CM
ASCENDING AORTA: 3.1 CM
AV HCM GRAD VALS: 102 MMHG
AV LVOT PEAK GRADIENT: 90 MMHG
BH CV ECHO MEAS - ACS: 2.5 CM
BH CV ECHO MEAS - AO ARCH DIAM (PROXIMAL TRANS.): 2.7 CM
BH CV ECHO MEAS - AO MAX PG: 32.2 MMHG
BH CV ECHO MEAS - AO MEAN PG: 17.4 MMHG
BH CV ECHO MEAS - AO ROOT AREA (BSA CORRECTED): 1.5
BH CV ECHO MEAS - AO ROOT AREA: 8.5 CM^2
BH CV ECHO MEAS - AO ROOT DIAM: 3.3 CM
BH CV ECHO MEAS - AO V2 MAX: 283.9 CM/SEC
BH CV ECHO MEAS - AO V2 MEAN: 199 CM/SEC
BH CV ECHO MEAS - AO V2 VTI: 52.6 CM
BH CV ECHO MEAS - ASC AORTA: 3.1 CM
BH CV ECHO MEAS - BSA(HAYCOCK): 2.1 M^2
BH CV ECHO MEAS - BSA: 2.1 M^2
BH CV ECHO MEAS - BZI_BMI: 24.8 KILOGRAMS/M^2
BH CV ECHO MEAS - BZI_METRIC_HEIGHT: 188 CM
BH CV ECHO MEAS - BZI_METRIC_WEIGHT: 87.5 KG
BH CV ECHO MEAS - EDV(MOD-SP4): 78 ML
BH CV ECHO MEAS - EDV(TEICH): 71.4 ML
BH CV ECHO MEAS - EF(CUBED): 84.9 %
BH CV ECHO MEAS - EF(MOD-BP): 66 %
BH CV ECHO MEAS - EF(MOD-SP4): 66.7 %
BH CV ECHO MEAS - EF(TEICH): 78.6 %
BH CV ECHO MEAS - ESV(MOD-SP4): 26 ML
BH CV ECHO MEAS - ESV(TEICH): 15.3 ML
BH CV ECHO MEAS - FS: 46.7 %
BH CV ECHO MEAS - IVS/LVPW: 1.2
BH CV ECHO MEAS - IVSD: 2 CM
BH CV ECHO MEAS - LAT PEAK E' VEL: 6.1 CM/SEC
BH CV ECHO MEAS - LV DIASTOLIC VOL/BSA (35-75): 36.4 ML/M^2
BH CV ECHO MEAS - LV MASS(C)D: 332.7 GRAMS
BH CV ECHO MEAS - LV MASS(C)DI: 155.4 GRAMS/M^2
BH CV ECHO MEAS - LV SYSTOLIC VOL/BSA (12-30): 12.1 ML/M^2
BH CV ECHO MEAS - LVIDD: 4 CM
BH CV ECHO MEAS - LVIDS: 2.1 CM
BH CV ECHO MEAS - LVLD AP4: 8.4 CM
BH CV ECHO MEAS - LVLS AP4: 7.2 CM
BH CV ECHO MEAS - LVOT AREA (M): 4.9 CM^2
BH CV ECHO MEAS - LVOT AREA: 4.9 CM^2
BH CV ECHO MEAS - LVOT DIAM: 2.5 CM
BH CV ECHO MEAS - LVPWD: 1.7 CM
BH CV ECHO MEAS - MED PEAK E' VEL: 7.1 CM/SEC
BH CV ECHO MEAS - MR MAX PG: 170.8 MMHG
BH CV ECHO MEAS - MR MAX VEL: 653.4 CM/SEC
BH CV ECHO MEAS - MV A DUR: 0.1 SEC
BH CV ECHO MEAS - MV A MAX VEL: 80.1 CM/SEC
BH CV ECHO MEAS - MV DEC SLOPE: 498.5 CM/SEC^2
BH CV ECHO MEAS - MV DEC TIME: 0.16 SEC
BH CV ECHO MEAS - MV E MAX VEL: 63.4 CM/SEC
BH CV ECHO MEAS - MV E/A: 0.79
BH CV ECHO MEAS - MV MAX PG: 4.5 MMHG
BH CV ECHO MEAS - MV MEAN PG: 2 MMHG
BH CV ECHO MEAS - MV P1/2T MAX VEL: 103.8 CM/SEC
BH CV ECHO MEAS - MV P1/2T: 61 MSEC
BH CV ECHO MEAS - MV V2 MAX: 105.6 CM/SEC
BH CV ECHO MEAS - MV V2 MEAN: 67 CM/SEC
BH CV ECHO MEAS - MV V2 VTI: 26.4 CM
BH CV ECHO MEAS - MVA P1/2T LCG: 2.1 CM^2
BH CV ECHO MEAS - MVA(P1/2T): 3.6 CM^2
BH CV ECHO MEAS - PA ACC TIME: 0.13 SEC
BH CV ECHO MEAS - PA MAX PG (FULL): 3.1 MMHG
BH CV ECHO MEAS - PA MAX PG: 7.3 MMHG
BH CV ECHO MEAS - PA PR(ACCEL): 22 MMHG
BH CV ECHO MEAS - PA V2 MAX: 134.7 CM/SEC
BH CV ECHO MEAS - PULM A REVS DUR: 0.16 SEC
BH CV ECHO MEAS - PULM A REVS VEL: 36.8 CM/SEC
BH CV ECHO MEAS - PULM DIAS VEL: 34.7 CM/SEC
BH CV ECHO MEAS - PULM S/D: 1.7
BH CV ECHO MEAS - PULM SYS VEL: 58.3 CM/SEC
BH CV ECHO MEAS - PVA(V,A): 2.9 CM^2
BH CV ECHO MEAS - PVA(V,D): 2.9 CM^2
BH CV ECHO MEAS - RV MAX PG: 4.2 MMHG
BH CV ECHO MEAS - RV MEAN PG: 2.4 MMHG
BH CV ECHO MEAS - RV V1 MAX: 102 CM/SEC
BH CV ECHO MEAS - RV V1 MEAN: 73.4 CM/SEC
BH CV ECHO MEAS - RV V1 VTI: 18.8 CM
BH CV ECHO MEAS - RVOT AREA: 3.9 CM^2
BH CV ECHO MEAS - RVOT DIAM: 2.2 CM
BH CV ECHO MEAS - SI(AO): 209.4 ML/M^2
BH CV ECHO MEAS - SI(CUBED): 26 ML/M^2
BH CV ECHO MEAS - SI(MOD-SP4): 24.3 ML/M^2
BH CV ECHO MEAS - SI(TEICH): 26.2 ML/M^2
BH CV ECHO MEAS - SUP REN AO DIAM: 2.1 CM
BH CV ECHO MEAS - SV(AO): 448.3 ML
BH CV ECHO MEAS - SV(CUBED): 55.7 ML
BH CV ECHO MEAS - SV(MOD-SP4): 52 ML
BH CV ECHO MEAS - SV(RVOT): 72.9 ML
BH CV ECHO MEAS - SV(TEICH): 56.2 ML
BH CV ECHO MEAS - TAPSE (>1.6): 2.4 CM
BH CV ECHO MEASUREMENTS AVERAGE E/E' RATIO: 9.61
BH CV XLRA - RV BASE: 2.8 CM
BH CV XLRA - RV LENGTH: 6.6 CM
BH CV XLRA - RV MID: 2.2 CM
BH CV XLRA - TDI S': 11.4 CM/SEC
LEFT ATRIUM VOLUME INDEX: 25 ML/M2
SINUS: 3.3 CM
STJ: 2.8 CM

## 2021-08-10 PROCEDURE — 93306 TTE W/DOPPLER COMPLETE: CPT

## 2021-08-10 PROCEDURE — 93356 MYOCRD STRAIN IMG SPCKL TRCK: CPT | Performed by: INTERNAL MEDICINE

## 2021-08-10 PROCEDURE — 93306 TTE W/DOPPLER COMPLETE: CPT | Performed by: INTERNAL MEDICINE

## 2021-08-10 PROCEDURE — 93356 MYOCRD STRAIN IMG SPCKL TRCK: CPT

## 2021-08-12 DIAGNOSIS — I42.1 HOCM (HYPERTROPHIC OBSTRUCTIVE CARDIOMYOPATHY) (HCC): Primary | ICD-10-CM

## 2021-08-12 RX ORDER — CARVEDILOL 12.5 MG/1
12.5 TABLET ORAL 2 TIMES DAILY
Qty: 180 TABLET | Refills: 3 | Status: SHIPPED | OUTPATIENT
Start: 2021-08-12 | End: 2021-09-14

## 2021-08-12 NOTE — PROGRESS NOTES
I have called and discussed results of echo with patient: Possible diagnosis of HOCM and treatment options and I will put him on a 48-hour Holter monitor to see if he has any arrhythmias.  I am going to stop chlorthalidone and start him on Coreg for blood pressure as well as HOCM.  Patient will get echocardiogram in 1 month.

## 2021-08-24 ENCOUNTER — TELEPHONE (OUTPATIENT)
Dept: CARDIOLOGY | Facility: CLINIC | Age: 51
End: 2021-08-24

## 2021-09-14 ENCOUNTER — OFFICE VISIT (OUTPATIENT)
Dept: CARDIOLOGY | Facility: CLINIC | Age: 51
End: 2021-09-14

## 2021-09-14 ENCOUNTER — LAB (OUTPATIENT)
Dept: LAB | Facility: HOSPITAL | Age: 51
End: 2021-09-14

## 2021-09-14 VITALS
OXYGEN SATURATION: 99 % | BODY MASS INDEX: 24.9 KG/M2 | WEIGHT: 194 LBS | HEART RATE: 66 BPM | SYSTOLIC BLOOD PRESSURE: 130 MMHG | HEIGHT: 74 IN | DIASTOLIC BLOOD PRESSURE: 94 MMHG

## 2021-09-14 DIAGNOSIS — I10 ESSENTIAL HYPERTENSION: Primary | ICD-10-CM

## 2021-09-14 DIAGNOSIS — I10 ESSENTIAL HYPERTENSION: ICD-10-CM

## 2021-09-14 DIAGNOSIS — E78.00 HYPERCHOLESTEROLEMIA: ICD-10-CM

## 2021-09-14 DIAGNOSIS — R06.02 EXERTIONAL SHORTNESS OF BREATH: ICD-10-CM

## 2021-09-14 DIAGNOSIS — I42.1 HOCM (HYPERTROPHIC OBSTRUCTIVE CARDIOMYOPATHY) (HCC): ICD-10-CM

## 2021-09-14 LAB
ANION GAP SERPL CALCULATED.3IONS-SCNC: 12.3 MMOL/L (ref 5–15)
BUN SERPL-MCNC: 13 MG/DL (ref 6–20)
BUN/CREAT SERPL: 12.5 (ref 7–25)
CALCIUM SPEC-SCNC: 9 MG/DL (ref 8.6–10.5)
CHLORIDE SERPL-SCNC: 110 MMOL/L (ref 98–107)
CO2 SERPL-SCNC: 23.7 MMOL/L (ref 22–29)
CREAT SERPL-MCNC: 1.04 MG/DL (ref 0.76–1.27)
GFR SERPL CREATININE-BSD FRML MDRD: 75 ML/MIN/1.73
GLUCOSE SERPL-MCNC: 103 MG/DL (ref 65–99)
POTASSIUM SERPL-SCNC: 4 MMOL/L (ref 3.5–5.2)
SODIUM SERPL-SCNC: 146 MMOL/L (ref 136–145)

## 2021-09-14 PROCEDURE — 80048 BASIC METABOLIC PNL TOTAL CA: CPT

## 2021-09-14 PROCEDURE — 93000 ELECTROCARDIOGRAM COMPLETE: CPT | Performed by: INTERNAL MEDICINE

## 2021-09-14 PROCEDURE — 36415 COLL VENOUS BLD VENIPUNCTURE: CPT

## 2021-09-14 PROCEDURE — 99214 OFFICE O/P EST MOD 30 MIN: CPT | Performed by: INTERNAL MEDICINE

## 2021-09-14 RX ORDER — CARVEDILOL 12.5 MG/1
18.75 TABLET ORAL 2 TIMES DAILY
Qty: 180 TABLET | Refills: 3 | Status: SHIPPED | OUTPATIENT
Start: 2021-09-14 | End: 2021-11-18

## 2021-09-14 NOTE — PROGRESS NOTES
PATIENTINFORMATION    Date of Office Visit: 2021  Encounter Provider: Max Husain MD  Place of Service: [facility] CARDIOLOGY  Patient Name: Santhosh Pope  : 1970    Subjective:     Encounter Date:2021      Patient ID: Santhosh Pope is a 51 y.o. male.    Chief Complaint   Patient presents with   • Heart Murmur     one month    • Hypertension   • Hyperlipidemia     HPI  Mr. Pope is a 49 years old man with past medical history of hypertension, hyperlipidemia, abnormal EKG, chest pain and shortness of breath came to cardiology clinic for follow-up visit.  I saw the patient first time 2021 in the [facility] and then followed up in clinic.  He had negative myocardial perfusion study and echocardiogram done twice.  The later echocardiogram showed evidence for hypertrophic obstructive cardiomyopathy and significant LVOT gradient.  Patient was subsequently started on a beta-blocker and discontinued hydrochlorothiazide and also on Holter monitor.  Today patient reports improved breathing and chest tightness.  He continues to deny any significant palpitations, presyncope syncope.  He had intermittent lower extremity swelling that seems to have improved over the last several days.  No ER visits or hospitalizations sinus rhythm in clinic last time.  He does not check his blood pressure at home but today in the office it was 130/80 mmHg, on arrival it was 130/94 mmHg.  Denies family history of sudden cardiac days and most of his family members actually had coronary artery disease.  ROS   All systems reviewed and negative except as noted in HPI.    Past Medical History:   Diagnosis Date   • Abnormal ECG    • Allergic    • Anxiety    • Headache    • Heart murmur    • HOCM (hypertrophic obstructive cardiomyopathy) (CMS/HCC) 2021   • Hyperlipidemia    • Hypertension        Past Surgical History:   Procedure Laterality Date   • BRAIN SURGERY      When he was 18 y/o   • COLONOSCOPY  "    • ESOPHAGOSCOPY / EGD      x 2    • EYE SURGERY      x2   • LEG SURGERY     • STOMACH SURGERY         Social History     Socioeconomic History   • Marital status:      Spouse name: Not on file   • Number of children: Not on file   • Years of education: Not on file   • Highest education level: Not on file   Tobacco Use   • Smoking status: Former Smoker     Packs/day: 1.00     Years: 19.00     Pack years: 19.00     Types: Cigarettes     Start date: 1987     Quit date: 2006     Years since quitting: 15.7   • Smokeless tobacco: Never Used   Vaping Use   • Vaping Use: Never used   Substance and Sexual Activity   • Alcohol use: Yes     Alcohol/week: 1.0 standard drinks     Types: 1 Cans of beer per week     Comment: Socially   • Drug use: No   • Sexual activity: Yes       Family History   Problem Relation Age of Onset   • Cancer Mother    • Lupus Mother    • Heart disease Father    • Cancer Father    • Heart disease Brother            ECG 12 Lead    Date/Time: 9/14/2021 1:41 PM  Performed by: Max Husain MD  Authorized by: Max Husain MD   Comparison: compared with previous ECG from 7/27/2021  Similar to previous ECG  Rhythm: sinus rhythm  Rate: normal  Conduction: conduction normal  ST Segments: ST segments normal  T Waves: T waves normal  QRS axis: normal  Other findings: left ventricular hypertrophy with strain    Clinical impression: abnormal EKG               Objective:     /94   Pulse 66   Ht 188 cm (74\")   Wt 88 kg (194 lb)   SpO2 99%   BMI 24.91 kg/m²  Body mass index is 24.91 kg/m².     Constitutional:       General: Not in acute distress.     Appearance: Well-developed. Not diaphoretic.   Eyes:      Pupils: Pupils are equal, round, and reactive to light.   HENT:      Head: Normocephalic and atraumatic.   Neck:      Thyroid: No thyromegaly.   Pulmonary:      Effort: Pulmonary effort is normal. No respiratory distress.      Breath sounds: Normal breath sounds. No wheezing. " No rales.   Chest:      Chest wall: Not tender to palpatation.   Cardiovascular:      Normal rate. Regular rhythm.      Murmurs: There is a early systolic murmur at the LLSB and apex.      No gallop.   Pulses:     Intact distal pulses.   Edema:     Peripheral edema absent.   Abdominal:      General: Bowel sounds are normal. There is no distension.      Palpations: Abdomen is soft.      Tenderness: There is no guarding.   Musculoskeletal: Normal range of motion.         General: No deformity.      Cervical back: Normal range of motion and neck supple. Skin:     General: Skin is warm and dry.      Findings: No rash.   Neurological:      Mental Status: Alert and oriented to person, place, and time.      Cranial Nerves: No cranial nerve deficit.      Deep Tendon Reflexes: Reflexes are normal and symmetric.   Psychiatric:         Judgment: Judgment normal.         Review Of Data: I have reviewed recent labs and documentation  Echo on 08/10/2021  · Left ventricular ejection fraction appears to be 66 - 70%. Left ventricular systolic function is normal. Abnormal global longitudinal LV strain (GLS) = -16.1%. Left ventricle strain data was reviewed by the physician and found to be accurate. Normal left ventricular cavity size noted. Left ventricular wall thickness is consistent with moderate to severe septal asymmetric hypertrophy. All left ventricular wall segments contract normally.  · The findings are consistent with obstructive, hypertrophic cardiomyopathy. Obstruction due to mitral valve systolic anterior motion.  · The most accurate Doppler interrogation shows a resting LVOT gradient of at least 56 mmHg however there are some images suggesting resting gradient as high as 90 mmHg.      Assessment/Plan:         1. HOCM- rest LVOT gradient -56, valsalva 90, interventricular septal thickness of 2 cm, preserved left ventricular ejection fraction  -Note in heart failure  -48-hour Holter with total PVC burden of 0.24% and no  ventricular run.  So far no presyncope or syncope.  -Patient reports improved symptoms following initiation of carvedilol 12.5 mg p.o. twice daily-heart rate in the 60s at rest-I have increased Coreg to 18.75 mg p.o. twice daily and patient will continue to monitor blood pressure at home  With lower blood pressure may decrease dose of losartan  Repeat echocardiogram in 1 month  May consider doing CMR with gadolinium     2.  Hypertension-near goal    3.  Hypercholesterolemia-on high intensity statin    4.  Family history of coronary artery disease-patient had negative myocardial perfusion study in March 2021    5.  Elevated creatinine-check BMP today      Diagnosis and plan of care discussed with patient and verbalized understanding.  Return to clinic in 3 months or sooner with any concerning symptoms         Max Husain MD  09/14/21  14:11 EDT

## 2021-11-16 ENCOUNTER — HOSPITAL ENCOUNTER (OUTPATIENT)
Dept: CARDIOLOGY | Facility: HOSPITAL | Age: 51
Discharge: HOME | End: 2021-11-16
Admitting: INTERNAL MEDICINE

## 2021-11-16 DIAGNOSIS — R06.02 EXERTIONAL SHORTNESS OF BREATH: ICD-10-CM

## 2021-11-16 PROCEDURE — 93306 TTE W/DOPPLER COMPLETE: CPT | Performed by: INTERNAL MEDICINE

## 2021-11-16 PROCEDURE — 93306 TTE W/DOPPLER COMPLETE: CPT

## 2021-11-17 LAB
AV HCM GRAD VALS: 71 MMHG
AV LVOT PEAK GRADIENT: 16 MMHG
BH CV ECHO MEAS - ACS: 2.5 CM
BH CV ECHO MEAS - AO MAX PG (FULL): 2.8 MMHG
BH CV ECHO MEAS - AO MAX PG: 19.4 MMHG
BH CV ECHO MEAS - AO MEAN PG (FULL): 0.47 MMHG
BH CV ECHO MEAS - AO MEAN PG: 10 MMHG
BH CV ECHO MEAS - AO ROOT AREA (BSA CORRECTED): 1.6
BH CV ECHO MEAS - AO ROOT AREA: 8.8 CM^2
BH CV ECHO MEAS - AO ROOT DIAM: 3.4 CM
BH CV ECHO MEAS - AO V2 MAX: 220 CM/SEC
BH CV ECHO MEAS - AO V2 MEAN: 148 CM/SEC
BH CV ECHO MEAS - AO V2 VTI: 43.3 CM
BH CV ECHO MEAS - AVA(I,A): 5.1 CM^2
BH CV ECHO MEAS - AVA(I,D): 5.1 CM^2
BH CV ECHO MEAS - AVA(V,A): 4.9 CM^2
BH CV ECHO MEAS - AVA(V,D): 4.9 CM^2
BH CV ECHO MEAS - BSA(HAYCOCK): 2.1 M^2
BH CV ECHO MEAS - BSA: 2.1 M^2
BH CV ECHO MEAS - BZI_BMI: 24.9 KILOGRAMS/M^2
BH CV ECHO MEAS - BZI_METRIC_HEIGHT: 188 CM
BH CV ECHO MEAS - BZI_METRIC_WEIGHT: 88 KG
BH CV ECHO MEAS - EDV(MOD-SP2): 68 ML
BH CV ECHO MEAS - EDV(MOD-SP4): 74 ML
BH CV ECHO MEAS - EDV(TEICH): 92.8 ML
BH CV ECHO MEAS - EF(CUBED): 81 %
BH CV ECHO MEAS - EF(MOD-BP): 57.8 %
BH CV ECHO MEAS - EF(MOD-SP2): 60.3 %
BH CV ECHO MEAS - EF(MOD-SP4): 60.8 %
BH CV ECHO MEAS - EF(TEICH): 73.8 %
BH CV ECHO MEAS - ESV(MOD-SP2): 27 ML
BH CV ECHO MEAS - ESV(MOD-SP4): 29 ML
BH CV ECHO MEAS - ESV(TEICH): 24.3 ML
BH CV ECHO MEAS - FS: 42.6 %
BH CV ECHO MEAS - IVS/LVPW: 0.97
BH CV ECHO MEAS - IVSD: 1.4 CM
BH CV ECHO MEAS - LAT PEAK E' VEL: 5.5 CM/SEC
BH CV ECHO MEAS - LV DIASTOLIC VOL/BSA (35-75): 34.5 ML/M^2
BH CV ECHO MEAS - LV MASS(C)D: 255.7 GRAMS
BH CV ECHO MEAS - LV MASS(C)DI: 119.2 GRAMS/M^2
BH CV ECHO MEAS - LV MAX PG: 16.6 MMHG
BH CV ECHO MEAS - LV MEAN PG: 9.5 MMHG
BH CV ECHO MEAS - LV SYSTOLIC VOL/BSA (12-30): 13.5 ML/M^2
BH CV ECHO MEAS - LV V1 MAX: 203.6 CM/SEC
BH CV ECHO MEAS - LV V1 MEAN: 140.6 CM/SEC
BH CV ECHO MEAS - LV V1 VTI: 42.3 CM
BH CV ECHO MEAS - LVIDD: 4.5 CM
BH CV ECHO MEAS - LVIDS: 2.6 CM
BH CV ECHO MEAS - LVLD AP2: 8.1 CM
BH CV ECHO MEAS - LVLD AP4: 8.6 CM
BH CV ECHO MEAS - LVLS AP2: 6.1 CM
BH CV ECHO MEAS - LVLS AP4: 7.3 CM
BH CV ECHO MEAS - LVOT AREA (M): 5.3 CM^2
BH CV ECHO MEAS - LVOT AREA: 5.3 CM^2
BH CV ECHO MEAS - LVOT DIAM: 2.6 CM
BH CV ECHO MEAS - LVPWD: 1.4 CM
BH CV ECHO MEAS - MED PEAK E' VEL: 6.4 CM/SEC
BH CV ECHO MEAS - MV A DUR: 0.11 SEC
BH CV ECHO MEAS - MV A MAX VEL: 78.4 CM/SEC
BH CV ECHO MEAS - MV DEC SLOPE: 443.6 CM/SEC^2
BH CV ECHO MEAS - MV DEC TIME: 0.2 SEC
BH CV ECHO MEAS - MV E MAX VEL: 61.7 CM/SEC
BH CV ECHO MEAS - MV E/A: 0.79
BH CV ECHO MEAS - MV MAX PG: 4.5 MMHG
BH CV ECHO MEAS - MV MEAN PG: 1.4 MMHG
BH CV ECHO MEAS - MV P1/2T MAX VEL: 92.3 CM/SEC
BH CV ECHO MEAS - MV P1/2T: 60.9 MSEC
BH CV ECHO MEAS - MV V2 MAX: 105.9 CM/SEC
BH CV ECHO MEAS - MV V2 MEAN: 54.1 CM/SEC
BH CV ECHO MEAS - MV V2 VTI: 29.3 CM
BH CV ECHO MEAS - MVA P1/2T LCG: 2.4 CM^2
BH CV ECHO MEAS - MVA(P1/2T): 3.6 CM^2
BH CV ECHO MEAS - MVA(VTI): 7.6 CM^2
BH CV ECHO MEAS - PA MAX PG (FULL): 2.4 MMHG
BH CV ECHO MEAS - PA MAX PG: 5.2 MMHG
BH CV ECHO MEAS - PA V2 MAX: 114 CM/SEC
BH CV ECHO MEAS - PULM A REVS DUR: 0.09 SEC
BH CV ECHO MEAS - PULM A REVS VEL: 32.1 CM/SEC
BH CV ECHO MEAS - PULM DIAS VEL: 33.4 CM/SEC
BH CV ECHO MEAS - PULM S/D: 1.4
BH CV ECHO MEAS - PULM SYS VEL: 47.6 CM/SEC
BH CV ECHO MEAS - PVA(V,A): 3.4 CM^2
BH CV ECHO MEAS - PVA(V,D): 3.4 CM^2
BH CV ECHO MEAS - QP/QS: 0.43
BH CV ECHO MEAS - RV MAX PG: 2.8 MMHG
BH CV ECHO MEAS - RV MEAN PG: 1.3 MMHG
BH CV ECHO MEAS - RV V1 MAX: 84.4 CM/SEC
BH CV ECHO MEAS - RV V1 MEAN: 52.7 CM/SEC
BH CV ECHO MEAS - RV V1 VTI: 20.9 CM
BH CV ECHO MEAS - RVOT AREA: 4.6 CM^2
BH CV ECHO MEAS - RVOT DIAM: 2.4 CM
BH CV ECHO MEAS - SI(AO): 178 ML/M^2
BH CV ECHO MEAS - SI(CUBED): 34.6 ML/M^2
BH CV ECHO MEAS - SI(LVOT): 103.8 ML/M^2
BH CV ECHO MEAS - SI(MOD-SP2): 19.1 ML/M^2
BH CV ECHO MEAS - SI(MOD-SP4): 21 ML/M^2
BH CV ECHO MEAS - SI(TEICH): 31.9 ML/M^2
BH CV ECHO MEAS - SV(AO): 381.9 ML
BH CV ECHO MEAS - SV(CUBED): 74.2 ML
BH CV ECHO MEAS - SV(LVOT): 222.7 ML
BH CV ECHO MEAS - SV(MOD-SP2): 41 ML
BH CV ECHO MEAS - SV(MOD-SP4): 45 ML
BH CV ECHO MEAS - SV(RVOT): 96.5 ML
BH CV ECHO MEAS - SV(TEICH): 68.4 ML
BH CV ECHO MEAS - TAPSE (>1.6): 3 CM
BH CV ECHO MEASUREMENTS AVERAGE E/E' RATIO: 10.37
BH CV XLRA - RV BASE: 3.4 CM
BH CV XLRA - RV LENGTH: 7.9 CM
BH CV XLRA - RV MID: 2.7 CM
BH CV XLRA - TDI S': 12.5 CM/SEC
LEFT ATRIUM VOLUME INDEX: 29 ML/M2
MAXIMAL PREDICTED HEART RATE: 168 BPM
SINUS: 3.7 CM
STJ: 3.3 CM
STRESS TARGET HR: 143 BPM

## 2021-11-18 DIAGNOSIS — I10 ESSENTIAL HYPERTENSION: Primary | ICD-10-CM

## 2021-11-18 NOTE — PROGRESS NOTES
I have called and discussed with patient.  He reports having morning today and dizziness after he was started on Coreg.  LVOT gradient improved on recent echocardiogram.  Because of poor tolerance to Coreg, going to switch him to verapamil.  Patient will also get BMP checked  I have advised him to check his blood pressure and heart rate during dizzy spells.  He will call back with another dizzy spell and I will put him on event monitor.  Consider doing cardiac MRI with contrast on follow-up visit

## 2021-12-08 ENCOUNTER — TELEPHONE (OUTPATIENT)
Dept: CARDIOLOGY | Facility: CLINIC | Age: 51
End: 2021-12-08

## 2021-12-08 DIAGNOSIS — R42 POSTURAL DIZZINESS WITH PRESYNCOPE: Primary | ICD-10-CM

## 2021-12-08 DIAGNOSIS — R55 POSTURAL DIZZINESS WITH PRESYNCOPE: Primary | ICD-10-CM

## 2021-12-08 NOTE — TELEPHONE ENCOUNTER
Dr. Husain,    Pt's wife called this morning. She said that Santhosh was doing well on the verapamil until yesterday. He was just sitting on the couch and experienced a severe dizzy spell. He felt like he was going to pass out. When he felt stable enough, he got up and went to lay down in bed.    His wife asked if this is going to continue or will the dizziness eventually go away?    Thank you,    Amina Quintero RN  [place]riage [facility]

## 2021-12-08 NOTE — TELEPHONE ENCOUNTER
Dr. Husain,    He did get his vitals after a dizzy spell yesterday, but not today. Yesterday his B/P was 131/76 and HR was 67.    Scheduling,    Could you get this pt scheduled for an event monitor ASAP?    Pt's wife: [phone]  Pt: [phone]    Thank you,    Amina Quintero RN  Triage [facility] [place]     Refill request for patients Nuvaring  Last refill 7/6/2017  Last OV 7/6/2017    RN phoned patient to help set up yearly visit  Left message for pt to return call to [facility]    Encounter open for patient call back

## 2022-01-25 ENCOUNTER — OFFICE VISIT (OUTPATIENT)
Dept: CARDIOLOGY | Facility: CLINIC | Age: 52
End: 2022-01-25

## 2022-01-25 VITALS
WEIGHT: 194.2 LBS | HEIGHT: 74 IN | HEART RATE: 69 BPM | BODY MASS INDEX: 24.92 KG/M2 | SYSTOLIC BLOOD PRESSURE: 108 MMHG | OXYGEN SATURATION: 99 % | DIASTOLIC BLOOD PRESSURE: 68 MMHG

## 2022-01-25 DIAGNOSIS — E78.00 HYPERCHOLESTEROLEMIA: ICD-10-CM

## 2022-01-25 DIAGNOSIS — I42.1 HOCM (HYPERTROPHIC OBSTRUCTIVE CARDIOMYOPATHY): Primary | ICD-10-CM

## 2022-01-25 DIAGNOSIS — I10 ESSENTIAL HYPERTENSION: ICD-10-CM

## 2022-01-25 PROCEDURE — 99214 OFFICE O/P EST MOD 30 MIN: CPT | Performed by: INTERNAL MEDICINE

## 2022-01-25 PROCEDURE — 93000 ELECTROCARDIOGRAM COMPLETE: CPT | Performed by: INTERNAL MEDICINE

## 2022-01-25 NOTE — PROGRESS NOTES
PATIENTINFORMATION    Date of Office Visit: 2022  Encounter Provider: Max Husain MD  Place of Service: [facility] CARDIOLOGY  Patient Name: Santhosh Pope  : 1970    Subjective:     Encounter Date:2022      Patient ID: Santhosh Pope is a 51 y.o. male.    Chief Complaint   Patient presents with   • HOCM     3 month f/u   • Hypertension   • Hypercholesterolemia     HPI  Mr. Pope is a pleasant 51 years old man with past medical history of hypertension, elevated/hypertrophic obstructive cardiomyopathy, hyperlipidemia came to cardiology clinic for follow-up visit. He was accompanied by his wife. He was initially started on Coreg besides  losartan  that he has taken since 2019. He could barely tolerate Coreg and this was subsequently changed to verapamil. He is tolerating verapamil well. Overall dizzy spells and fatigue has improved. He reports continuing to feel tired still. He denies any significant chest pain denies any palpitations or syncopal episodes. He had a sleep study yesterday.  Home BP readings with in range but office reading today was 108/68 mmHg    He reports significant abnormal lipid panel.    ROS   All systems reviewed and negative except as noted in HPI.    Past Medical History:   Diagnosis Date   • Abnormal ECG    • Allergic    • Anxiety    • Headache    • Heart murmur    • HOCM (hypertrophic obstructive cardiomyopathy) ([facility]) 2021   • Hyperlipidemia    • Hypertension        Past Surgical History:   Procedure Laterality Date   • BRAIN SURGERY      When he was 18 y/o   • COLONOSCOPY     • ESOPHAGOSCOPY / EGD      x 2    • EYE SURGERY      x2   • LEG SURGERY     • STOMACH SURGERY         Social History     Socioeconomic History   • Marital status:    Tobacco Use   • Smoking status: Former Smoker     Packs/day: 1.00     Years: 19.00     Pack years: 19.00     Types: Cigarettes     Start date:      Quit date:      Years since quitting:  "16.0   • Smokeless tobacco: Never Used   • Tobacco comment: caffeine use    Vaping Use   • Vaping Use: Never used   Substance and Sexual Activity   • Alcohol use: Yes     Alcohol/week: 1.0 standard drink     Types: 1 Cans of beer per week     Comment: Socially   • Drug use: No   • Sexual activity: Yes       Family History   Problem Relation Age of Onset   • Cancer Mother    • Lupus Mother    • Heart disease Father    • Cancer Father    • Heart disease Brother            ECG 12 Lead    Date/Time: 1/25/2022 1:53 PM  Performed by: Max Husain MD  Authorized by: Max Husain MD   Comparison: compared with previous ECG from 9/14/2021  Similar to previous ECG  Rhythm: sinus rhythm  Rate: normal  Conduction: conduction normal  ST Segments: ST segments normal  T Waves: T waves normal  QRS axis: normal  Other findings: left ventricular hypertrophy with strain    Clinical impression: abnormal EKG               Objective:     /68 (BP Location: Left arm, Patient Position: Sitting)   Pulse 69   Ht 188 cm (74\")   Wt 88.1 kg (194 lb 3.2 oz)   SpO2 99%   BMI 24.93 kg/m²  Body mass index is 24.93 kg/m².     Constitutional:       General: Not in acute distress.     Appearance: Well-developed. Not diaphoretic.   Eyes:      Pupils: Pupils are equal, round, and reactive to light.   HENT:      Head: Normocephalic and atraumatic.   Neck:      Thyroid: No thyromegaly.   Pulmonary:      Effort: Pulmonary effort is normal. No respiratory distress.      Breath sounds: Normal breath sounds. No wheezing. No rales.   Chest:      Chest wall: Not tender to palpatation.   Cardiovascular:      Normal rate. Regular rhythm.      No gallop.      Comments: Diffuse precordia systolic murmur   Pulses:     Intact distal pulses.   Edema:     Peripheral edema absent.   Abdominal:      General: Bowel sounds are normal. There is no distension.      Palpations: Abdomen is soft.      Tenderness: There is no guarding. "   Musculoskeletal: Normal range of motion.         General: No deformity.      Cervical back: Normal range of motion and neck supple. Skin:     General: Skin is warm and dry.      Findings: No rash.   Neurological:      Mental Status: Alert and oriented to person, place, and time.      Cranial Nerves: No cranial nerve deficit.      Deep Tendon Reflexes: Reflexes are normal and symmetric.   Psychiatric:         Judgment: Judgment normal.         Review Of Data:       Assessment/Plan:       1. HOCM as well as LVH- ekg with LVH and a strain pattern  - he could not tolerate beta-blocker. Started on verapamil  -Diffuse systolic murmur over precordium  -LVOT gradients have improved some just after starting coreg   -Consider repeating echo on follow-up visit.    2.  Hypertension-suspect BP is running on the lower side at home  Decrease losartan by half    3.  Hypercholesterolemia-patient will start taking 40 mg of atorvastatin    4.  Family history of coronary artery disease-patient had negative myocardial perfusion study in March 2021    5.  Hx of elevated Cr     6. Pending sleep study     7. His sone diagnosed with HOCM per family screening recommendations     Encourage patient to keep himself hydrated well. Cut losartan by half and call clinic with blood pressure logs in 2 weeks.  I have increase atorvastatin back to 40 mg p.o. daily.  Follow-up sleep study. Call clinic with symptom updates in 1 month    Return to clinic in 6 months or sooner with any concerning symptoms.      Diagnosis and plan of care discussed with patient and verbalized understanding.           Max Husain MD  01/25/22  14:22 EST

## 2022-03-16 ENCOUNTER — APPOINTMENT (OUTPATIENT)
Dept: GENERAL RADIOLOGY | Facility: HOSPITAL | Age: 52
End: 2022-03-16

## 2022-03-16 ENCOUNTER — HOSPITAL ENCOUNTER (EMERGENCY)
Facility: HOSPITAL | Age: 52
Discharge: ANOTHER HOSPITAL | End: 2022-03-16
Attending: EMERGENCY MEDICINE | Admitting: EMERGENCY MEDICINE

## 2022-03-16 ENCOUNTER — APPOINTMENT (OUTPATIENT)
Dept: CT IMAGING | Facility: HOSPITAL | Age: 52
End: 2022-03-16

## 2022-03-16 ENCOUNTER — TELEPHONE (OUTPATIENT)
Dept: CARDIOLOGY | Facility: CLINIC | Age: 52
End: 2022-03-16

## 2022-03-16 ENCOUNTER — APPOINTMENT (OUTPATIENT)
Dept: CARDIOLOGY | Facility: HOSPITAL | Age: 52
End: 2022-03-16

## 2022-03-16 ENCOUNTER — APPOINTMENT (OUTPATIENT)
Dept: NEUROLOGY | Facility: HOSPITAL | Age: 52
End: 2022-03-16

## 2022-03-16 VITALS
WEIGHT: 201 LBS | HEIGHT: 76 IN | SYSTOLIC BLOOD PRESSURE: 129 MMHG | OXYGEN SATURATION: 98 % | RESPIRATION RATE: 18 BRPM | HEART RATE: 113 BPM | BODY MASS INDEX: 24.48 KG/M2 | TEMPERATURE: 98.2 F | DIASTOLIC BLOOD PRESSURE: 91 MMHG

## 2022-03-16 DIAGNOSIS — Z87.820 H/O TRAUMATIC BRAIN INJURY: ICD-10-CM

## 2022-03-16 DIAGNOSIS — D33.2 BENIGN NEOPLASM OF BRAIN, UNSPECIFIED BRAIN REGION: ICD-10-CM

## 2022-03-16 DIAGNOSIS — Z86.79 HISTORY OF HYPERTENSION: ICD-10-CM

## 2022-03-16 DIAGNOSIS — Z86.79 HISTORY OF HYPERTROPHIC CARDIOMYOPATHY: ICD-10-CM

## 2022-03-16 DIAGNOSIS — G93.40 ACUTE ENCEPHALOPATHY: Primary | ICD-10-CM

## 2022-03-16 PROBLEM — G93.6 CEREBRAL EDEMA (HCC): Status: ACTIVE | Noted: 2022-03-16

## 2022-03-16 PROBLEM — D49.6 BRAIN TUMOR (HCC): Status: ACTIVE | Noted: 2022-03-16

## 2022-03-16 LAB
ABO GROUP BLD: NORMAL
ALBUMIN SERPL-MCNC: 4.7 G/DL (ref 3.5–5.2)
ALBUMIN/GLOB SERPL: 1.9 G/DL
ALP SERPL-CCNC: 76 U/L (ref 39–117)
ALT SERPL W P-5'-P-CCNC: 14 U/L (ref 1–41)
AMMONIA BLD-SCNC: 62 UMOL/L (ref 16–60)
AMPHET+METHAMPHET UR QL: NEGATIVE
ANION GAP SERPL CALCULATED.3IONS-SCNC: 13.3 MMOL/L (ref 5–15)
APTT PPP: 25.8 SECONDS (ref 22.7–35.4)
AST SERPL-CCNC: 16 U/L (ref 1–40)
BARBITURATES UR QL SCN: NEGATIVE
BASOPHILS # BLD AUTO: 0.03 10*3/MM3 (ref 0–0.2)
BASOPHILS NFR BLD AUTO: 0.2 % (ref 0–1.5)
BENZODIAZ UR QL SCN: NEGATIVE
BILIRUB SERPL-MCNC: 0.5 MG/DL (ref 0–1.2)
BLD GP AB SCN SERPL QL: NEGATIVE
BUN SERPL-MCNC: 13 MG/DL (ref 6–20)
BUN/CREAT SERPL: 14.8 (ref 7–25)
CALCIUM SPEC-SCNC: 9.4 MG/DL (ref 8.6–10.5)
CANNABINOIDS SERPL QL: NEGATIVE
CHLORIDE SERPL-SCNC: 101 MMOL/L (ref 98–107)
CK SERPL-CCNC: 96 U/L (ref 20–200)
CO2 SERPL-SCNC: 23.7 MMOL/L (ref 22–29)
COCAINE UR QL: NEGATIVE
CREAT SERPL-MCNC: 0.88 MG/DL (ref 0.76–1.27)
DEPRECATED RDW RBC AUTO: 40.5 FL (ref 37–54)
EGFRCR SERPLBLD CKD-EPI 2021: 103.5 ML/MIN/1.73
EOSINOPHIL # BLD AUTO: 0 10*3/MM3 (ref 0–0.4)
EOSINOPHIL NFR BLD AUTO: 0 % (ref 0.3–6.2)
ERYTHROCYTE [DISTWIDTH] IN BLOOD BY AUTOMATED COUNT: 13.2 % (ref 12.3–15.4)
ETHANOL BLD-MCNC: <10 MG/DL (ref 0–10)
ETHANOL UR QL: <0.01 %
GLOBULIN UR ELPH-MCNC: 2.5 GM/DL
GLUCOSE SERPL-MCNC: 126 MG/DL (ref 65–99)
HCT VFR BLD AUTO: 44.1 % (ref 37.5–51)
HGB BLD-MCNC: 15.5 G/DL (ref 13–17.7)
HOLD SPECIMEN: NORMAL
HOLD SPECIMEN: NORMAL
IMM GRANULOCYTES # BLD AUTO: 0.14 10*3/MM3 (ref 0–0.05)
IMM GRANULOCYTES NFR BLD AUTO: 0.7 % (ref 0–0.5)
INR PPP: 1.03 (ref 0.9–1.1)
LYMPHOCYTES # BLD AUTO: 0.95 10*3/MM3 (ref 0.7–3.1)
LYMPHOCYTES NFR BLD AUTO: 4.9 % (ref 19.6–45.3)
MAGNESIUM SERPL-MCNC: 2 MG/DL (ref 1.6–2.6)
MCH RBC QN AUTO: 30 PG (ref 26.6–33)
MCHC RBC AUTO-ENTMCNC: 35.1 G/DL (ref 31.5–35.7)
MCV RBC AUTO: 85.3 FL (ref 79–97)
METHADONE UR QL SCN: NEGATIVE
MONOCYTES # BLD AUTO: 0.75 10*3/MM3 (ref 0.1–0.9)
MONOCYTES NFR BLD AUTO: 3.8 % (ref 5–12)
NEUTROPHILS NFR BLD AUTO: 17.67 10*3/MM3 (ref 1.7–7)
NEUTROPHILS NFR BLD AUTO: 90.4 % (ref 42.7–76)
NRBC BLD AUTO-RTO: 0 /100 WBC (ref 0–0.2)
OPIATES UR QL: NEGATIVE
OXYCODONE UR QL SCN: NEGATIVE
PLATELET # BLD AUTO: 287 10*3/MM3 (ref 140–450)
PMV BLD AUTO: 9.8 FL (ref 6–12)
POTASSIUM SERPL-SCNC: 4.3 MMOL/L (ref 3.5–5.2)
PROT SERPL-MCNC: 7.2 G/DL (ref 6–8.5)
PROTHROMBIN TIME: 13.4 SECONDS (ref 11.7–14.2)
QT INTERVAL: 452 MS
RBC # BLD AUTO: 5.17 10*6/MM3 (ref 4.14–5.8)
RH BLD: POSITIVE
SARS-COV-2 RNA PNL SPEC NAA+PROBE: NOT DETECTED
SODIUM SERPL-SCNC: 138 MMOL/L (ref 136–145)
T&S EXPIRATION DATE: NORMAL
T4 FREE SERPL-MCNC: 1.02 NG/DL (ref 0.93–1.7)
TROPONIN T SERPL-MCNC: <0.01 NG/ML (ref 0–0.03)
TSH SERPL DL<=0.05 MIU/L-ACNC: 0.67 UIU/ML (ref 0.27–4.2)
WBC NRBC COR # BLD: 19.54 10*3/MM3 (ref 3.4–10.8)
WHOLE BLOOD HOLD SPECIMEN: NORMAL
WHOLE BLOOD HOLD SPECIMEN: NORMAL

## 2022-03-16 PROCEDURE — 31500 INSERT EMERGENCY AIRWAY: CPT

## 2022-03-16 PROCEDURE — C9803 HOPD COVID-19 SPEC COLLECT: HCPCS | Performed by: EMERGENCY MEDICINE

## 2022-03-16 PROCEDURE — 85025 COMPLETE CBC W/AUTO DIFF WBC: CPT | Performed by: EMERGENCY MEDICINE

## 2022-03-16 PROCEDURE — 71045 X-RAY EXAM CHEST 1 VIEW: CPT

## 2022-03-16 PROCEDURE — 25010000002 ONDANSETRON PER 1 MG: Performed by: EMERGENCY MEDICINE

## 2022-03-16 PROCEDURE — 70496 CT ANGIOGRAPHY HEAD: CPT

## 2022-03-16 PROCEDURE — 99284 EMERGENCY DEPT VISIT MOD MDM: CPT | Performed by: PSYCHIATRY & NEUROLOGY

## 2022-03-16 PROCEDURE — 0 IOPAMIDOL PER 1 ML: Performed by: EMERGENCY MEDICINE

## 2022-03-16 PROCEDURE — 86901 BLOOD TYPING SEROLOGIC RH(D): CPT | Performed by: EMERGENCY MEDICINE

## 2022-03-16 PROCEDURE — 85610 PROTHROMBIN TIME: CPT | Performed by: EMERGENCY MEDICINE

## 2022-03-16 PROCEDURE — 80307 DRUG TEST PRSMV CHEM ANLYZR: CPT | Performed by: EMERGENCY MEDICINE

## 2022-03-16 PROCEDURE — 36415 COLL VENOUS BLD VENIPUNCTURE: CPT

## 2022-03-16 PROCEDURE — 94799 UNLISTED PULMONARY SVC/PX: CPT

## 2022-03-16 PROCEDURE — 83735 ASSAY OF MAGNESIUM: CPT | Performed by: EMERGENCY MEDICINE

## 2022-03-16 PROCEDURE — 25010000002 PROPOFOL 10 MG/ML EMULSION: Performed by: EMERGENCY MEDICINE

## 2022-03-16 PROCEDURE — 85730 THROMBOPLASTIN TIME PARTIAL: CPT | Performed by: EMERGENCY MEDICINE

## 2022-03-16 PROCEDURE — 99284 EMERGENCY DEPT VISIT MOD MDM: CPT

## 2022-03-16 PROCEDURE — 99285 EMERGENCY DEPT VISIT HI MDM: CPT

## 2022-03-16 PROCEDURE — 96365 THER/PROPH/DIAG IV INF INIT: CPT

## 2022-03-16 PROCEDURE — 84439 ASSAY OF FREE THYROXINE: CPT | Performed by: EMERGENCY MEDICINE

## 2022-03-16 PROCEDURE — 0 LEVETIRACETAM IN NACL 0.75% 1000 MG/100ML SOLUTION: Performed by: NURSE PRACTITIONER

## 2022-03-16 PROCEDURE — 84484 ASSAY OF TROPONIN QUANT: CPT | Performed by: EMERGENCY MEDICINE

## 2022-03-16 PROCEDURE — 99285 EMERGENCY DEPT VISIT HI MDM: CPT | Performed by: NURSE PRACTITIONER

## 2022-03-16 PROCEDURE — 82565 ASSAY OF CREATININE: CPT

## 2022-03-16 PROCEDURE — 96374 THER/PROPH/DIAG INJ IV PUSH: CPT

## 2022-03-16 PROCEDURE — 86900 BLOOD TYPING SEROLOGIC ABO: CPT | Performed by: EMERGENCY MEDICINE

## 2022-03-16 PROCEDURE — 84443 ASSAY THYROID STIM HORMONE: CPT | Performed by: EMERGENCY MEDICINE

## 2022-03-16 PROCEDURE — 80053 COMPREHEN METABOLIC PANEL: CPT | Performed by: EMERGENCY MEDICINE

## 2022-03-16 PROCEDURE — 70498 CT ANGIOGRAPHY NECK: CPT

## 2022-03-16 PROCEDURE — 86850 RBC ANTIBODY SCREEN: CPT | Performed by: EMERGENCY MEDICINE

## 2022-03-16 PROCEDURE — 82550 ASSAY OF CK (CPK): CPT | Performed by: EMERGENCY MEDICINE

## 2022-03-16 PROCEDURE — 0042T HC CT CEREBRAL PERFUSION W/WO CONTRAST: CPT

## 2022-03-16 PROCEDURE — 93010 ELECTROCARDIOGRAM REPORT: CPT | Performed by: INTERNAL MEDICINE

## 2022-03-16 PROCEDURE — 25010000002 DEXAMETHASONE PER 1 MG: Performed by: EMERGENCY MEDICINE

## 2022-03-16 PROCEDURE — 82140 ASSAY OF AMMONIA: CPT | Performed by: EMERGENCY MEDICINE

## 2022-03-16 PROCEDURE — 94002 VENT MGMT INPAT INIT DAY: CPT

## 2022-03-16 PROCEDURE — 93005 ELECTROCARDIOGRAM TRACING: CPT | Performed by: EMERGENCY MEDICINE

## 2022-03-16 PROCEDURE — 96375 TX/PRO/DX INJ NEW DRUG ADDON: CPT

## 2022-03-16 PROCEDURE — 87635 SARS-COV-2 COVID-19 AMP PRB: CPT | Performed by: EMERGENCY MEDICINE

## 2022-03-16 PROCEDURE — 25010000002 LORAZEPAM PER 2 MG: Performed by: EMERGENCY MEDICINE

## 2022-03-16 PROCEDURE — 82077 ASSAY SPEC XCP UR&BREATH IA: CPT | Performed by: EMERGENCY MEDICINE

## 2022-03-16 RX ORDER — LORAZEPAM 2 MG/ML
2 INJECTION INTRAMUSCULAR ONCE
Status: COMPLETED | OUTPATIENT
Start: 2022-03-16 | End: 2022-03-16

## 2022-03-16 RX ORDER — LEVETIRACETAM 10 MG/ML
1000 INJECTION INTRAVASCULAR EVERY 12 HOURS SCHEDULED
Status: DISCONTINUED | OUTPATIENT
Start: 2022-03-16 | End: 2022-03-16 | Stop reason: HOSPADM

## 2022-03-16 RX ORDER — ROCURONIUM BROMIDE 10 MG/ML
80 INJECTION, SOLUTION INTRAVENOUS ONCE
Status: COMPLETED | OUTPATIENT
Start: 2022-03-16 | End: 2022-03-16

## 2022-03-16 RX ORDER — DEXAMETHASONE SODIUM PHOSPHATE 4 MG/ML
4 INJECTION, SOLUTION INTRA-ARTICULAR; INTRALESIONAL; INTRAMUSCULAR; INTRAVENOUS; SOFT TISSUE EVERY 6 HOURS
Status: DISCONTINUED | OUTPATIENT
Start: 2022-03-16 | End: 2022-03-16 | Stop reason: HOSPADM

## 2022-03-16 RX ORDER — LEVETIRACETAM 100 MG/ML
1000 SOLUTION ORAL EVERY 12 HOURS SCHEDULED
Status: DISCONTINUED | OUTPATIENT
Start: 2022-03-16 | End: 2022-03-16

## 2022-03-16 RX ORDER — ONDANSETRON 2 MG/ML
4 INJECTION INTRAMUSCULAR; INTRAVENOUS ONCE
Status: COMPLETED | OUTPATIENT
Start: 2022-03-16 | End: 2022-03-16

## 2022-03-16 RX ORDER — DEXAMETHASONE SODIUM PHOSPHATE 10 MG/ML
10 INJECTION INTRAMUSCULAR; INTRAVENOUS ONCE
Status: COMPLETED | OUTPATIENT
Start: 2022-03-16 | End: 2022-03-16

## 2022-03-16 RX ORDER — SODIUM CHLORIDE 0.9 % (FLUSH) 0.9 %
10 SYRINGE (ML) INJECTION AS NEEDED
Status: DISCONTINUED | OUTPATIENT
Start: 2022-03-16 | End: 2022-03-16 | Stop reason: HOSPADM

## 2022-03-16 RX ORDER — ETOMIDATE 2 MG/ML
27 INJECTION INTRAVENOUS ONCE
Status: COMPLETED | OUTPATIENT
Start: 2022-03-16 | End: 2022-03-16

## 2022-03-16 RX ORDER — LEVETIRACETAM 10 MG/ML
1000 INJECTION INTRAVASCULAR ONCE
Status: DISCONTINUED | OUTPATIENT
Start: 2022-03-16 | End: 2022-03-16

## 2022-03-16 RX ADMIN — ETOMIDATE 27 MG: 40 INJECTION, SOLUTION INTRAVENOUS at 15:49

## 2022-03-16 RX ADMIN — SODIUM CHLORIDE 1000 ML: 9 INJECTION, SOLUTION INTRAVENOUS at 15:07

## 2022-03-16 RX ADMIN — DEXAMETHASONE SODIUM PHOSPHATE 10 MG: 10 INJECTION, SOLUTION INTRAMUSCULAR; INTRAVENOUS at 15:34

## 2022-03-16 RX ADMIN — IOPAMIDOL 150 ML: 755 INJECTION, SOLUTION INTRAVENOUS at 14:36

## 2022-03-16 RX ADMIN — PROPOFOL 5 MCG/KG/MIN: 10 INJECTION, EMULSION INTRAVENOUS at 16:26

## 2022-03-16 RX ADMIN — ONDANSETRON 4 MG: 2 INJECTION INTRAMUSCULAR; INTRAVENOUS at 15:34

## 2022-03-16 RX ADMIN — ROCURONIUM BROMIDE 80 MG: 10 INJECTION, SOLUTION INTRAVENOUS at 15:49

## 2022-03-16 RX ADMIN — LORAZEPAM 2 MG: 2 INJECTION INTRAMUSCULAR; INTRAVENOUS at 14:46

## 2022-03-16 RX ADMIN — LEVETIRACETAM 1000 MG: 10 INJECTION INTRAVASCULAR at 15:36

## 2022-03-16 NOTE — TELEPHONE ENCOUNTER
Called and left a  for pt's wife. I asked her to call us back with an update on Mr. Pope's status.    Thank you,    Amina Quintero RN  Triage [facility] [place]

## 2022-03-16 NOTE — TELEPHONE ENCOUNTER
Updated by patient's wife that patient is being stat flighted to [facility].    Thank you,    Amina Quintero RN  Triage [facility] [place]

## 2022-03-16 NOTE — TELEPHONE ENCOUNTER
Dr. Husain,    Pt's wife called this morning. She said that pt is very affected by the Verapamil. He has been losing his eye sight, his B/P has been low, he's unable to think clearly or talk clearly and he's been in bed 7 days. She said he had to quit work because of his symptoms.    B/P readings:  106/93  103/77  89/73  99/72    He is taking Verapamil  mg daily and Losartan 50 mg daily (these are meds he cut the dose in half per his med list.)    I recommended ER, but patient's wife said he will likely not go. She also is trying to get him on her insurance since he had to quit work and that change has not been processed yet.    Do you have any recommendations for them?    Thank you,    Amina Quintero RN  Triage [facility]

## 2022-04-04 LAB — CREAT BLDA-MCNC: 0.7 MG/DL (ref 0.6–1.3)

## 2022-06-28 ENCOUNTER — OFFICE VISIT (OUTPATIENT)
Dept: CARDIOLOGY | Facility: CLINIC | Age: 52
End: 2022-06-28

## 2022-06-28 VITALS
BODY MASS INDEX: 22.24 KG/M2 | DIASTOLIC BLOOD PRESSURE: 78 MMHG | HEIGHT: 76 IN | WEIGHT: 182.6 LBS | HEART RATE: 66 BPM | SYSTOLIC BLOOD PRESSURE: 138 MMHG

## 2022-06-28 DIAGNOSIS — I20.9 ANGINA PECTORIS: Primary | ICD-10-CM

## 2022-06-28 DIAGNOSIS — I42.1 HOCM (HYPERTROPHIC OBSTRUCTIVE CARDIOMYOPATHY): ICD-10-CM

## 2022-06-28 PROCEDURE — 99214 OFFICE O/P EST MOD 30 MIN: CPT | Performed by: INTERNAL MEDICINE

## 2022-06-28 PROCEDURE — 93000 ELECTROCARDIOGRAM COMPLETE: CPT | Performed by: INTERNAL MEDICINE

## 2022-06-28 RX ORDER — LEVETIRACETAM 500 MG/1
500 TABLET ORAL 2 TIMES DAILY
COMMUNITY
Start: 2022-06-01

## 2022-06-28 RX ORDER — FLUCONAZOLE 200 MG/1
TABLET ORAL
COMMUNITY
Start: 2022-06-03 | End: 2022-09-06 | Stop reason: ALTCHOICE

## 2022-06-28 RX ORDER — METOPROLOL SUCCINATE 25 MG/1
25 TABLET, EXTENDED RELEASE ORAL DAILY
Qty: 90 TABLET | Refills: 3 | Status: SHIPPED | OUTPATIENT
Start: 2022-06-28 | End: 2023-03-07

## 2022-06-28 NOTE — PROGRESS NOTES
PATIENTINFORMATION    Date of Office Visit: 2022  Encounter Provider: Max Husain MD  Place of Service: [facility] CARDIOLOGY  Patient Name: Santhosh Pope  : 1970    Subjective:     Encounter Date:2022      Patient ID: Santhosh Pope is a 52 y.o. male.    Chief Complaint   Patient presents with   • Follow-up     HPI  Mr. Pope is a pleasant 53 yo  Man came to cardiology clinic for fu visit.  He was admitted in 2022 at [facility] for removal of a complicated meningioma by Dr. Burch with extended hospital stay and then rehab.  Readmitted in April with meningitis.  He reports having cardiac complication during surgery that he seems to have recovered uneventfully.    He has been doing physical therapy and exercising at home with significant improvement of right side weakness that he developed during removal of meningioma.  Vision on left side almost back to baseline.  Currently walks without any walker and denies any exertional chest pain, shortness of breath, palpitations, presyncope syncope or significant lower extremity swelling.  Losartan was discontinued and he has been on verapamil and metoprolol that he tolerated well.      ROS  All systems reviewed and negative except as noted in HPI.    Past Medical History:   Diagnosis Date   • Abnormal ECG    • Allergic    • Anxiety    • Headache    • Heart murmur    • HOCM (hypertrophic obstructive cardiomyopathy) (HCC) 2021   • Hyperlipidemia    • Hypertension        Past Surgical History:   Procedure Laterality Date   • BRAIN SURGERY      When he was 16 y/o   • COLONOSCOPY     • ESOPHAGOSCOPY / EGD      x 2    • EYE SURGERY      x2   • LEG SURGERY     • STOMACH SURGERY         Social History     Socioeconomic History   • Marital status:    Tobacco Use   • Smoking status: Former Smoker     Packs/day: 1.00     Years: 19.00     Pack years: 19.00     Types: Cigarettes     Start date:      Quit  "date:      Years since quittin.4   • Smokeless tobacco: Never Used   • Tobacco comment: caffeine use    Vaping Use   • Vaping Use: Never used   Substance and Sexual Activity   • Alcohol use: Yes     Alcohol/week: 1.0 standard drink     Types: 1 Cans of beer per week     Comment: Socially   • Drug use: No   • Sexual activity: Yes       Family History   Problem Relation Age of Onset   • Cancer Mother    • Lupus Mother    • Heart disease Father    • Cancer Father    • Heart disease Brother            ECG 12 Lead    Date/Time: 2022 9:12 AM  Performed by: Max Husain MD  Authorized by: Max Husain MD   Comparison: compared with previous ECG from 3/16/2022  Similar to previous ECG  Rhythm: sinus rhythm  Rate: normal  Conduction: conduction normal  ST Segments: ST segments normal  T Waves: T waves normal  QRS axis: normal  Other findings: left ventricular hypertrophy with strain    Clinical impression: abnormal EKG               Objective:     /78 (BP Location: Left arm, Patient Position: Sitting)   Pulse 66   Ht 193 cm (76\")   Wt 82.8 kg (182 lb 9.6 oz)   BMI 22.23 kg/m²  Body mass index is 22.23 kg/m².     Constitutional:       General: Not in acute distress.     Appearance: Well-developed. Not diaphoretic.   Eyes:      Pupils: Pupils are equal, round, and reactive to light.   HENT:      Head: Normocephalic and atraumatic.   Neck:      Thyroid: No thyromegaly.   Pulmonary:      Effort: Pulmonary effort is normal. No respiratory distress.      Breath sounds: Normal breath sounds. No wheezing. No rales.   Chest:      Chest wall: Not tender to palpatation.   Cardiovascular:      Normal rate. Regular rhythm.      No gallop.   Pulses:     Intact distal pulses.   Edema:     Peripheral edema absent.   Abdominal:      General: Bowel sounds are normal. There is no distension.      Palpations: Abdomen is soft.      Tenderness: There is no guarding.   Musculoskeletal: Normal range of " motion.         General: No deformity.      Cervical back: Normal range of motion and neck supple. Skin:     General: Skin is warm and dry.      Findings: No rash.   Neurological:      Mental Status: Alert and oriented to person, place, and time.      Cranial Nerves: No cranial nerve deficit.      Deep Tendon Reflexes: Reflexes are normal and symmetric.   Psychiatric:         Judgment: Judgment normal.         Review Of Data: I have reviewed pertinent recent labs, images and documents and pertinent findings included in HPI or assessment below.          Assessment/Plan:         1. HOCM as well as LVH- ekg with LVH and a strain pattern  -Currently on verapamil and beta-blocker that he is tolerating well  -Echo in November 2021 with the rest LVOT gradient of 16 and Valsalva 71  -Normal myocardial perfusion study March 2020    2.  Hypertension-fairly controlled on verapamil and metoprolol.    3.  Complicated meningioma: 95% removed after embolization 2 surgeries.  No significant focal deficits today.  Following up with Dr. Burch at [facility].     4.   Hypercholesterolemia-patient will start taking 40 mg of atorvastatin    5.  Family history of coronary artery disease-patient had negative myocardial perfusion study in March 2021    6.  Normal sleep study    7. His son diagnosed with HOCM per family screening recommendations       Mr. Pope significantly improved compared to prior visits where he complained of fatigue and headache likely from meningioma.  He has follow-up appointment with his neurosurgeon.  I will check treadmill test and echocardiogram to evaluate LVOT gradient and left ventricular ejection fraction.  Get medical records from [facility].    Return to clinic in 3 months or sooner with any concerning symptoms.  Diagnosis and plan of care discussed with patient and verbalized understanding.            Your medication list          Accurate as of June 28, 2022 10:16 AM. If you  have any questions, ask your nurse or doctor.            START taking these medications      Instructions Last Dose Given Next Dose Due   metoprolol succinate XL 25 MG 24 hr tablet  Commonly known as: TOPROL-XL  Started by: Max Husain MD      Take 1 tablet by mouth Daily.          CONTINUE taking these medications      Instructions Last Dose Given Next Dose Due   atorvastatin 40 MG tablet  Commonly known as: LIPITOR      Take 40 mg by mouth Daily.       fluconazole 200 MG tablet  Commonly known as: DIFLUCAN           levETIRAcetam 500 MG tablet  Commonly known as: KEPPRA           omeprazole 40 MG capsule  Commonly known as: priLOSEC      Take 40 mg by mouth Daily.       verapamil  MG CR tablet  Commonly known as: CALAN-SR      Take 1 tablet by mouth 2 (Two) Times a Day.          STOP taking these medications    losartan 100 MG tablet  Commonly known as: COZAAR  Stopped by: Max Husain MD        metoprolol tartrate 25 MG tablet  Commonly known as: LOPRESSOR  Stopped by: Max Husain MD              Where to Get Your Medications      These medications were sent to [facility] - [place], [place] - [address] AT [address] [facility] - [phone]  - [phone]  [id number], [facility] [address]    Phone: [phone]   · metoprolol succinate XL 25 MG 24 hr tablet             Max Husain MD  06/28/22  10:16 EDT

## 2022-07-05 ENCOUNTER — HOSPITAL ENCOUNTER (OUTPATIENT)
Dept: CARDIOLOGY | Facility: HOSPITAL | Age: 52
Discharge: HOME | End: 2022-07-05
Admitting: INTERNAL MEDICINE

## 2022-07-05 VITALS
SYSTOLIC BLOOD PRESSURE: 160 MMHG | HEIGHT: 76 IN | WEIGHT: 182.54 LBS | HEART RATE: 94 BPM | BODY MASS INDEX: 22.23 KG/M2 | DIASTOLIC BLOOD PRESSURE: 120 MMHG

## 2022-07-05 DIAGNOSIS — I42.1 HOCM (HYPERTROPHIC OBSTRUCTIVE CARDIOMYOPATHY): ICD-10-CM

## 2022-07-05 LAB
AV HCM GRAD VALS: 35 MMHG
AV LVOT PEAK GRADIENT POST STRESS: 25 MMHG
AV LVOT PEAK GRADIENT: 15 MMHG
BH CV ECHO HCM PROVOKED PEAK GRADIENT: 171 MMHG
BH CV ECHO MEAS - ACS: 2.6 CM
BH CV ECHO MEAS - AO MAX PG: 10.8 MMHG
BH CV ECHO MEAS - AO MEAN PG: 5.7 MMHG
BH CV ECHO MEAS - AO ROOT DIAM: 3.5 CM
BH CV ECHO MEAS - AO V2 MAX: 164.2 CM/SEC
BH CV ECHO MEAS - AO V2 VTI: 31.7 CM
BH CV ECHO MEAS - AVA(I,D): 3.5 CM2
BH CV ECHO MEAS - EDV(CUBED): 100.9 ML
BH CV ECHO MEAS - EDV(MOD-SP4): 91 ML
BH CV ECHO MEAS - EF(MOD-BP): 70 %
BH CV ECHO MEAS - EF(MOD-SP4): 73.6 %
BH CV ECHO MEAS - EF_3D-VOL: 58 %
BH CV ECHO MEAS - ESV(CUBED): 41.8 ML
BH CV ECHO MEAS - ESV(MOD-SP4): 24 ML
BH CV ECHO MEAS - FS: 25.4 %
BH CV ECHO MEAS - IVS/LVPW: 1.14 CM
BH CV ECHO MEAS - IVSD: 1.19 CM
BH CV ECHO MEAS - LAT PEAK E' VEL: 6.4 CM/SEC
BH CV ECHO MEAS - LV DIASTOLIC VOL/BSA (35-75): 42.8 CM2
BH CV ECHO MEAS - LV MASS(C)D: 188.5 GRAMS
BH CV ECHO MEAS - LV MAX PG: 10.8 MMHG
BH CV ECHO MEAS - LV MEAN PG: 5.4 MMHG
BH CV ECHO MEAS - LV SYSTOLIC VOL/BSA (12-30): 11.3 CM2
BH CV ECHO MEAS - LV V1 MAX: 164 CM/SEC
BH CV ECHO MEAS - LV V1 VTI: 30.8 CM
BH CV ECHO MEAS - LVIDD: 4.7 CM
BH CV ECHO MEAS - LVIDS: 3.5 CM
BH CV ECHO MEAS - LVOT AREA: 3.6 CM2
BH CV ECHO MEAS - LVOT DIAM: 2.15 CM
BH CV ECHO MEAS - LVPWD: 1.04 CM
BH CV ECHO MEAS - MED PEAK E' VEL: 5.5 CM/SEC
BH CV ECHO MEAS - MR MAX PG: 113.2 MMHG
BH CV ECHO MEAS - MR MAX VEL: 532.1 CM/SEC
BH CV ECHO MEAS - MV A DUR: 0.14 SEC
BH CV ECHO MEAS - MV A MAX VEL: 81.8 CM/SEC
BH CV ECHO MEAS - MV DEC SLOPE: 313 CM/SEC2
BH CV ECHO MEAS - MV DEC TIME: 0.12 MSEC
BH CV ECHO MEAS - MV E MAX VEL: 47.6 CM/SEC
BH CV ECHO MEAS - MV E/A: 0.58
BH CV ECHO MEAS - MV MAX PG: 3.7 MMHG
BH CV ECHO MEAS - MV MEAN PG: 2.04 MMHG
BH CV ECHO MEAS - MV P1/2T: 52.5 MSEC
BH CV ECHO MEAS - MV V2 VTI: 23.6 CM
BH CV ECHO MEAS - MVA(P1/2T): 4.2 CM2
BH CV ECHO MEAS - MVA(VTI): 4.7 CM2
BH CV ECHO MEAS - PA ACC TIME: 0.1 SEC
BH CV ECHO MEAS - PA PR(ACCEL): 34.6 MMHG
BH CV ECHO MEAS - PA V2 MAX: 116.7 CM/SEC
BH CV ECHO MEAS - PULM A REVS DUR: 0.09 SEC
BH CV ECHO MEAS - PULM A REVS VEL: 30.4 CM/SEC
BH CV ECHO MEAS - PULM DIAS VEL: 21.4 CM/SEC
BH CV ECHO MEAS - PULM S/D: 1.64
BH CV ECHO MEAS - PULM SYS VEL: 35.1 CM/SEC
BH CV ECHO MEAS - RAP SYSTOLE: 3 MMHG
BH CV ECHO MEAS - RV MAX PG: 3 MMHG
BH CV ECHO MEAS - RV V1 MAX: 86.3 CM/SEC
BH CV ECHO MEAS - RV V1 VTI: 14.8 CM
BH CV ECHO MEAS - RVSP: 25 MMHG
BH CV ECHO MEAS - SI(MOD-SP4): 31.5 ML/M2
BH CV ECHO MEAS - SV(LVOT): 111.5 ML
BH CV ECHO MEAS - SV(MOD-SP4): 67 ML
BH CV ECHO MEAS - TAPSE (>1.6): 2.32 CM
BH CV ECHO MEAS - TR MAX PG: 22.1 MMHG
BH CV ECHO MEAS - TR MAX VEL: 234.8 CM/SEC
BH CV ECHO MEASUREMENTS AVERAGE E/E' RATIO: 8
BH CV STRESS BP STAGE 1: NORMAL
BH CV STRESS BP STAGE 2: NORMAL
BH CV STRESS DURATION MIN STAGE 1: 3
BH CV STRESS DURATION MIN STAGE 2: 2
BH CV STRESS DURATION SEC STAGE 1: 0
BH CV STRESS DURATION SEC STAGE 2: 0
BH CV STRESS GRADE STAGE 1: 10
BH CV STRESS GRADE STAGE 2: 12
BH CV STRESS HR STAGE 1: 138
BH CV STRESS HR STAGE 2: 143
BH CV STRESS METS STAGE 1: 5
BH CV STRESS METS STAGE 2: 6
BH CV STRESS PROTOCOL 1: NORMAL
BH CV STRESS SPEED STAGE 1: 1.7
BH CV STRESS SPEED STAGE 2: 2.5
BH CV STRESS STAGE 1: 1
BH CV STRESS STAGE 2: 2
BH CV XLRA - RV BASE: 3.2 CM
BH CV XLRA - RV LENGTH: 5.9 CM
BH CV XLRA - RV MID: 2.36 CM
BH CV XLRA - TDI S': 6.7 CM/SEC
LEFT ATRIUM VOLUME INDEX: 25.3 ML/M2
MAXIMAL PREDICTED HEART RATE: 167 BPM
PERCENT MAX PREDICTED HR: 85.63 %
SINUS: 3.1 CM
STJ: 3 CM
STRESS BASELINE BP: NORMAL MMHG
STRESS BASELINE HR: 94 BPM
STRESS PERCENT HR: 101 %
STRESS POST ESTIMATED WORKLOAD: 6 METS
STRESS POST EXERCISE DUR MIN: 5 MIN
STRESS POST EXERCISE DUR SEC: 0 SEC
STRESS POST PEAK BP: NORMAL MMHG
STRESS POST PEAK HR: 143 BPM
STRESS TARGET HR: 142 BPM

## 2022-07-05 PROCEDURE — 93320 DOPPLER ECHO COMPLETE: CPT | Performed by: INTERNAL MEDICINE

## 2022-07-05 PROCEDURE — 93352 ADMIN ECG CONTRAST AGENT: CPT | Performed by: INTERNAL MEDICINE

## 2022-07-05 PROCEDURE — 93350 STRESS TTE ONLY: CPT

## 2022-07-05 PROCEDURE — 93320 DOPPLER ECHO COMPLETE: CPT

## 2022-07-05 PROCEDURE — 93350 STRESS TTE ONLY: CPT | Performed by: INTERNAL MEDICINE

## 2022-07-05 PROCEDURE — 93018 CV STRESS TEST I&R ONLY: CPT | Performed by: INTERNAL MEDICINE

## 2022-07-05 PROCEDURE — 93325 DOPPLER ECHO COLOR FLOW MAPG: CPT

## 2022-07-05 PROCEDURE — 93325 DOPPLER ECHO COLOR FLOW MAPG: CPT | Performed by: INTERNAL MEDICINE

## 2022-07-05 PROCEDURE — 93017 CV STRESS TEST TRACING ONLY: CPT

## 2022-07-05 PROCEDURE — 25010000002 PERFLUTREN (DEFINITY) 8.476 MG IN SODIUM CHLORIDE (PF) 0.9 % 10 ML INJECTION: Performed by: INTERNAL MEDICINE

## 2022-07-05 PROCEDURE — 93016 CV STRESS TEST SUPVJ ONLY: CPT | Performed by: INTERNAL MEDICINE

## 2022-07-05 RX ADMIN — PERFLUTREN 3 ML: 6.52 INJECTION, SUSPENSION INTRAVENOUS at 11:44

## 2022-07-06 NOTE — PROGRESS NOTES
Please notify patient stress test does not show coronary artery disease.  Pressures in the heart looked better. Continue talking metoprolol and verapamil. I will see him back in 3 months. Call sooner with any concerns. Thanks

## 2022-07-16 NOTE — TELEPHONE ENCOUNTER
Pt's wife called back. She is on her way to the hospital and will be here in about 25 minutes.    Thank you,    Amina Quintero RN  Triage [facility]     Spot checked patient with portable US FHR in the 140s.

## 2022-08-26 ENCOUNTER — TELEPHONE (OUTPATIENT)
Dept: CARDIOLOGY | Facility: CLINIC | Age: 52
End: 2022-08-26

## 2022-09-06 ENCOUNTER — OFFICE VISIT (OUTPATIENT)
Dept: CARDIOLOGY | Facility: CLINIC | Age: 52
End: 2022-09-06

## 2022-09-06 VITALS
SYSTOLIC BLOOD PRESSURE: 136 MMHG | RESPIRATION RATE: 16 BRPM | HEART RATE: 63 BPM | WEIGHT: 190 LBS | OXYGEN SATURATION: 98 % | HEIGHT: 75 IN | DIASTOLIC BLOOD PRESSURE: 85 MMHG | BODY MASS INDEX: 23.62 KG/M2

## 2022-09-06 DIAGNOSIS — I42.1 HOCM (HYPERTROPHIC OBSTRUCTIVE CARDIOMYOPATHY): Primary | ICD-10-CM

## 2022-09-06 DIAGNOSIS — I10 ESSENTIAL HYPERTENSION: ICD-10-CM

## 2022-09-06 PROCEDURE — 93000 ELECTROCARDIOGRAM COMPLETE: CPT | Performed by: NURSE PRACTITIONER

## 2022-09-06 PROCEDURE — 99214 OFFICE O/P EST MOD 30 MIN: CPT | Performed by: NURSE PRACTITIONER

## 2022-09-06 RX ORDER — MULTIPLE VITAMINS W/ MINERALS TAB 9MG-400MCG
1 TAB ORAL DAILY
COMMUNITY

## 2022-09-06 RX ORDER — BISACODYL 5 MG/1
5 TABLET, DELAYED RELEASE ORAL DAILY PRN
COMMUNITY

## 2022-09-06 NOTE — PROGRESS NOTES
Date of Office Visit: 2022  Encounter Provider: Chelsea Ryan, LUCIUS, APRN  Place of Service: [facility] CARDIOLOGY  Patient Name: Santhosh Pope  :1970        Subjective:     Chief Complaint:  Hypertrophic obstructive cardiomyopathy, hypertension      History of Present Illness:  Santhosh Pope is a 52 y.o. male patient of Dr. Husain.  This patient is new to me and I have reviewed his records.    Patient has a history of hypertrophic obstructive cardiomyopathy, hypertension, LVH, meningioma, hypercholesterolemia, family history of coronary disease, normal sleep study.    Patient was admitted to [facility] 3/2022 for removal of a complicated meningioma by Dr. Burch.  He had an extended hospital stay and then rehab.  He was readmitted in April with meningitis.  He reported that he had some cardiac complications with surgery.  When he was seen in the cardiology office he seemed to have recovered.  He had stress echo 2022 showing normal LV systolic function with EF of 61 to 65%, mild septal asymmetric hypertrophy, no definite systolic anterior motion of the mitral valve noted with LVOT gradient at rest of 35 mmHg and post exercise measurements were difficult to measure but felt to likely be 73 mmHg.  Patient had held his blood pressure medications prior to the stress echo and his initial blood pressure was 160/120 and his postexercise BP was 226/112.  Stress echo was felt to be negative for ischemia.        Patient presents to office today for follow-up appointment.  Patient's significant other is with him in the office today, per patient preference.  Patient reports he is doing well from a cardiac standpoint since last visit.  He reports that unfortunately he is likely going to need another brain surgery to get the rest of the meningioma in October or November.  He has another MRI  and the plan will be developed after that time.  He  completed radiation and has had increased seizures recently and is following with his neurosurgeon on this.  His blood pressure at home has been well controlled, most readings over the last month are 100-130 systolic, most 110s-120s.  Patient denies any cardiac symptoms or concerns.  Denies chest pain or discomfort, shortness of breath, shortness of breath with exertion, palpitations, edema, dizziness, syncope, near syncope, falls, fatigue, or abnormal bleeding.              Past Medical History:   Diagnosis Date   • Abnormal ECG    • Allergic    • Anxiety    • Headache    • Heart murmur    • HOCM (hypertrophic obstructive cardiomyopathy) (HCC) 9/14/2021   • Hyperlipidemia    • Hypertension      Past Surgical History:   Procedure Laterality Date   • BRAIN SURGERY      When he was 18 y/o   • COLONOSCOPY     • ESOPHAGOSCOPY / EGD      x 2    • EYE SURGERY      x2   • LEG SURGERY     • STOMACH SURGERY       Outpatient Medications Prior to Visit   Medication Sig Dispense Refill   • atorvastatin (LIPITOR) 40 MG tablet Take 40 mg by mouth Daily.     • bisacodyl (DULCOLAX) 5 MG EC tablet Take 5 mg by mouth Daily As Needed for Constipation.     • levETIRAcetam (KEPPRA) 500 MG tablet Take 1,500 mg by mouth 2 (Two) Times a Day.     • metoprolol succinate XL (TOPROL-XL) 25 MG 24 hr tablet Take 1 tablet by mouth Daily. 90 tablet 3   • multivitamin with minerals (MULTIVITAMIN ADULTS PO) Take 1 tablet by mouth Daily.     • omeprazole (priLOSEC) 40 MG capsule Take 40 mg by mouth Daily.     • verapamil SR (CALAN-SR) 120 MG CR tablet Take 1 tablet by mouth 2 (Two) Times a Day. (Patient taking differently: Take 240 mg by mouth 2 (Two) Times a Day.) 60 tablet 5   • fluconazole (DIFLUCAN) 200 MG tablet        Facility-Administered Medications Prior to Visit   Medication Dose Route Frequency Provider Last Rate Last Admin   • nitroglycerin (NITROSTAT) SL tablet 0.4 mg  0.4 mg Sublingual Q5 Min PRN Max Husain MD       • sodium  "chloride 0.9 % flush 10 mL  10 mL Intravenous PRN Max Husain MD           Allergies as of 2022   • (No Known Allergies)     Social History     Socioeconomic History   • Marital status:    Tobacco Use   • Smoking status: Former Smoker     Packs/day: 1.00     Years: 19.00     Pack years: 19.00     Types: Cigarettes     Start date:      Quit date: 2006     Years since quittin.6   • Smokeless tobacco: Never Used   • Tobacco comment: caffeine use    Vaping Use   • Vaping Use: Never used   Substance and Sexual Activity   • Alcohol use: Yes     Alcohol/week: 1.0 standard drink     Types: 1 Cans of beer per week     Comment: Socially   • Drug use: No   • Sexual activity: Yes     Family History   Problem Relation Age of Onset   • Cancer Mother    • Lupus Mother    • Heart disease Father    • Cancer Father    • Heart disease Brother        Review of Systems   Constitutional: Negative for malaise/fatigue.   Cardiovascular:        SEE HPI   Respiratory: Negative for shortness of breath.    Hematologic/Lymphatic: Negative for bleeding problem.   Musculoskeletal: Negative for falls.   Gastrointestinal: Negative for melena.   Genitourinary: Negative for hematuria.   Neurological: Positive for seizures. Negative for dizziness.   Psychiatric/Behavioral: Negative for altered mental status.          Objective:     Vitals:    22 0835   BP: 136/85   BP Location: Right arm   Patient Position: Sitting   Cuff Size: Adult   Pulse: 63   Resp: 16   SpO2: 98%   Weight: 86.2 kg (190 lb)   Height: 190.5 cm (75\")     Body mass index is 23.75 kg/m².      PHYSICAL EXAM:  Constitutional:       General: Not in acute distress.     Appearance: Well-developed. Not diaphoretic.   Eyes:      Pupils: Pupils are equal, round, and reactive to light.   HENT:      Head: Normocephalic and atraumatic.   Pulmonary:      Effort: Pulmonary effort is normal. No respiratory distress.      Breath sounds: Normal breath sounds. No " wheezing. No rales.   Cardiovascular:      Normal rate. Regular rhythm.      Murmurs: There is a grade 1/6 systolic murmur.      No gallop. No click. No rub.   Edema:     Peripheral edema absent.   Abdominal:      General: Bowel sounds are normal.      Palpations: Abdomen is soft.   Musculoskeletal: Normal range of motion.         General: No tenderness or deformity.      Cervical back: Neck supple. Skin:     General: Skin is warm and dry.      Findings: No erythema.   Neurological:      Mental Status: Alert and oriented to person, place, and time.             ECG 12 Lead    Date/Time: 9/6/2022 8:59 AM  Performed by: Chelsea Ryan DNP, APRN  Authorized by: Chelsea Ryan DNP, APRN   Comparison: compared with previous ECG from 6/28/2022  Rhythm: sinus rhythm  BPM: 63  Conduction: left bundle branch block  Comments: No significant changes from previous EKGs              Assessment:       Diagnosis Plan   1. HOCM (hypertrophic obstructive cardiomyopathy) ([facility])  ECG 12 Lead   2. Essential hypertension           Plan:     1. Hypertrophic obstructive cardiomyopathy and LVH: On verapamil and beta-blocker.  Echo 7/2022 showed LVOT gradient of 35 mmHg at rest and 73 mmHg with Valsalva. BP was quite high before and after exercise however he reports he had held his metoprolol and verapamil the day before the test in the morning of.  Blood pressure log in the office today shows blood pressure well controlled at home.  Does get occasional blood pressure readings down to 100 systolic after medications, asymptomatic.  Continue current medical therapy.  Do not feel we have room to uptitrate at this point.  He denies cardiac symptoms or concerns at this time.  2. Hypertension: Well-controlled, as above.  3. Hyperlipidemia  4. Family history of coronary artery disease: Patient with normal myocardial perfusion study 3/2021.  7/2022 stress echo without evidence of ischemia.  5. History of normal sleep study  6. Complicated  meningioma: Follows with Dr. Burch at [facility]    Patient to keep October follow-up with Dr. Husain as scheduled or follow-up sooner if needed for any new, recurrent, or worsening symptoms or concerns.  Discussed in detail signs/symptoms that warrant sooner call or follow-up to the office or ER visit.             Your medication list          Accurate as of September 6, 2022  9:46 AM. If you have any questions, ask your nurse or doctor.            CHANGE how you take these medications      Instructions Last Dose Given Next Dose Due   verapamil  MG CR tablet  Commonly known as: CALAN-SR  What changed: how much to take      Take 1 tablet by mouth 2 (Two) Times a Day.          CONTINUE taking these medications      Instructions Last Dose Given Next Dose Due   atorvastatin 40 MG tablet  Commonly known as: LIPITOR      Take 40 mg by mouth Daily.       bisacodyl 5 MG EC tablet  Commonly known as: DULCOLAX      Take 5 mg by mouth Daily As Needed for Constipation.       levETIRAcetam 500 MG tablet  Commonly known as: KEPPRA      Take 1,500 mg by mouth 2 (Two) Times a Day.       metoprolol succinate XL 25 MG 24 hr tablet  Commonly known as: TOPROL-XL      Take 1 tablet by mouth Daily.       multivitamin with minerals tablet tablet      Take 1 tablet by mouth Daily.       omeprazole 40 MG capsule  Commonly known as: priLOSEC      Take 40 mg by mouth Daily.              The above medication changes may not have been made by this provider.  Patient's medication list was updated to reflect medications they are currently taking including medication changes made by other providers.            Thanks,    Chelsea Ryan, LUCIUS, APRN  09/06/2022         Dictated utilizing Dragon dictation

## 2022-10-06 ENCOUNTER — OFFICE VISIT (OUTPATIENT)
Dept: CARDIOLOGY | Facility: CLINIC | Age: 52
End: 2022-10-06

## 2022-10-06 VITALS
DIASTOLIC BLOOD PRESSURE: 86 MMHG | SYSTOLIC BLOOD PRESSURE: 110 MMHG | WEIGHT: 190 LBS | BODY MASS INDEX: 23.62 KG/M2 | HEART RATE: 65 BPM | HEIGHT: 75 IN

## 2022-10-06 DIAGNOSIS — I42.1 HOCM (HYPERTROPHIC OBSTRUCTIVE CARDIOMYOPATHY): Primary | ICD-10-CM

## 2022-10-06 DIAGNOSIS — I10 ESSENTIAL HYPERTENSION: ICD-10-CM

## 2022-10-06 DIAGNOSIS — E78.00 HYPERCHOLESTEROLEMIA: ICD-10-CM

## 2022-10-06 PROCEDURE — 93000 ELECTROCARDIOGRAM COMPLETE: CPT | Performed by: INTERNAL MEDICINE

## 2022-10-06 PROCEDURE — 99214 OFFICE O/P EST MOD 30 MIN: CPT | Performed by: INTERNAL MEDICINE

## 2022-10-06 RX ORDER — ESLICARBAZEPINE ACETATE 400 MG/1
800 TABLET ORAL EVERY MORNING
COMMUNITY
Start: 2022-09-08

## 2022-10-06 RX ORDER — ESLICARBAZEPINE ACETATE 800 MG/1
800 TABLET ORAL NIGHTLY
COMMUNITY
Start: 2022-10-01

## 2022-10-06 NOTE — PROGRESS NOTES
PATIENTINFORMATION    Date of Office Visit: 10/06/2022  Encounter Provider: Max Husain MD  Place of Service: [facility] CARDIOLOGY  Patient Name: Santhosh Pope  : 1970    Subjective:     Encounter Date:10/06/2022      Patient ID: Santhosh Pope is a 52 y.o. male.    Chief Complaint   Patient presents with   • Follow-up     HPI  Mr. Pope is a pleasant 52 years old man who came to cardiology clinic for follow-up visit.  He was accompanied by his wife Indira.  He reports feeling increasingly tired since radiation therapy in 2022.  He had some worsening of seizure for which he was started on Aptiom besides Keppra with improved seizure frequency.  He denies any significant shortness of breath, orthopnea, PND, palpitations, presyncope or syncope or extremity swelling.  He is tolerating verapamil and metoprolol well.  Blood pressure and heart rate running normal during home monitoring.    He has pending surgery for removal of remaining meningioma by Dr. Burch on .      ROS  All systems reviewed and negative except as noted in HPI.    Past Medical History:   Diagnosis Date   • Abnormal ECG    • Allergic    • Anxiety    • Headache    • Heart murmur    • HOCM (hypertrophic obstructive cardiomyopathy) (HCC) 2021   • Hyperlipidemia    • Hypertension        Past Surgical History:   Procedure Laterality Date   • BRAIN SURGERY      When he was 16 y/o   • COLONOSCOPY     • ESOPHAGOSCOPY / EGD      x 2    • EYE SURGERY      x2   • LEG SURGERY     • STOMACH SURGERY         Social History     Socioeconomic History   • Marital status:    Tobacco Use   • Smoking status: Former Smoker     Packs/day: 1.00     Years: 19.00     Pack years: 19.00     Types: Cigarettes     Start date:      Quit date: 2006     Years since quittin.7   • Smokeless tobacco: Never Used   • Tobacco comment: caffeine use    Vaping Use   • Vaping Use: Never used   Substance and Sexual  "Activity   • Alcohol use: Yes     Alcohol/week: 1.0 standard drink     Types: 1 Cans of beer per week     Comment: Socially   • Drug use: No   • Sexual activity: Yes       Family History   Problem Relation Age of Onset   • Cancer Mother    • Lupus Mother    • Heart disease Father    • Cancer Father    • Heart disease Brother            ECG 12 Lead    Date/Time: 10/6/2022 3:45 PM  Performed by: Max Husain MD  Authorized by: Max Husain MD   Comparison: compared with previous ECG from 9/6/2022  Similar to previous ECG  Rhythm: sinus rhythm  Rate: normal  Conduction: left bundle branch block  ST Segments: ST segments normal  T Waves: T waves normal  QRS axis: normal  Other: no other findings    Clinical impression: abnormal EKG               Objective:     /86 (BP Location: Left arm, Patient Position: Sitting)   Pulse 65   Ht 190.5 cm (75\")   Wt 86.2 kg (190 lb)   BMI 23.75 kg/m²  Body mass index is 23.75 kg/m².     Constitutional:       General: Not in acute distress.     Appearance: Well-developed. Not diaphoretic.   Eyes:      Pupils: Pupils are equal, round, and reactive to light.   HENT:      Head: Normocephalic and atraumatic.   Neck:      Thyroid: No thyromegaly.   Pulmonary:      Effort: Pulmonary effort is normal. No respiratory distress.      Breath sounds: Normal breath sounds. No wheezing. No rales.   Chest:      Chest wall: Not tender to palpatation.   Cardiovascular:      Normal rate. Regular rhythm.      No gallop.   Pulses:     Intact distal pulses.   Edema:     Peripheral edema absent.   Abdominal:      General: Bowel sounds are normal. There is no distension.      Palpations: Abdomen is soft.      Tenderness: There is no guarding.   Musculoskeletal: Normal range of motion.         General: No deformity.      Cervical back: Normal range of motion and neck supple. Skin:     General: Skin is warm and dry.      Findings: No rash.   Neurological:      Mental Status: Alert and " oriented to person, place, and time.      Cranial Nerves: No cranial nerve deficit.      Deep Tendon Reflexes: Reflexes are normal and symmetric.   Psychiatric:         Judgment: Judgment normal.         Review Of Data: I have reviewed pertinent recent labs, images and documents and pertinent findings included in HPI or assessment below.          Assessment/Plan:         1. HOCM as well as LVH-prior EKGs show LVH with strain pattern.  Recent EKGs showing left bundle branch block.  -Currently on verapamil and beta-blocker that he is tolerating well  -Normal myocardial perfusion study March 2020  -Stress echo done in July 2022 showing resting LVOT gradient of 35 and peak exercise 74 mmHg.  He exercised for 5 minutes without evidence for ischemia.  -Not in overt heart failure during exam today.  No significant cardiac symptoms feels tired most of the time but he could still walk easily 2 blocks.  His wife drives him around because of seizures    2.  Hypertension-fairly controlled on verapamil and metoprolol.    3.  Complicated meningioma complicated by seizure disorder: 95% removed after embolization 2 surgeries.  There is evidence for recurrence of tumor and getting another surgery on October 31.  He received radiation therapy in August 2022.     4.   Hypercholesterolemia-on atorvastatin.    5.  Family history of coronary artery disease-patient had negative myocardial perfusion study in March 2021    6.  Normal sleep study    7. His son diagnosed with HOCM per family screening recommendations     8. LBBB    Repeat echo to measure LVOT gradient   Diagnosis and plan of care discussed with patient and verbalized understanding.            Your medication list          Accurate as of October 6, 2022  4:19 PM. If you have any questions, ask your nurse or doctor.            CHANGE how you take these medications      Instructions Last Dose Given Next Dose Due   verapamil  MG CR tablet  Commonly known as: CALAN-SR  What  changed: how much to take      Take 1 tablet by mouth 2 (Two) Times a Day.          CONTINUE taking these medications      Instructions Last Dose Given Next Dose Due   Aptiom 400 MG tablet  Generic drug: Eslicarbazepine Acetate      Take 400 mg by mouth Every Morning.       Aptiom 800 MG tablet tablet  Generic drug: Eslicarbazepine Acetate      Take 800 mg by mouth Every Night.       atorvastatin 40 MG tablet  Commonly known as: LIPITOR      Take 40 mg by mouth Daily.       bisacodyl 5 MG EC tablet  Commonly known as: DULCOLAX      Take 5 mg by mouth Daily As Needed for Constipation.       levETIRAcetam 500 MG tablet  Commonly known as: KEPPRA      Take 1,500 mg by mouth 2 (Two) Times a Day.       metoprolol succinate XL 25 MG 24 hr tablet  Commonly known as: TOPROL-XL      Take 1 tablet by mouth Daily.       multivitamin with minerals tablet tablet      Take 1 tablet by mouth Daily.       omeprazole 40 MG capsule  Commonly known as: priLOSEC      Take 40 mg by mouth Daily.                  Max Husain MD  10/06/22  16:19 EDT

## 2022-10-10 ENCOUNTER — HOSPITAL ENCOUNTER (OUTPATIENT)
Dept: CARDIOLOGY | Facility: HOSPITAL | Age: 52
Discharge: HOME | End: 2022-10-10
Admitting: INTERNAL MEDICINE

## 2022-10-10 VITALS
WEIGHT: 190 LBS | HEIGHT: 75 IN | SYSTOLIC BLOOD PRESSURE: 156 MMHG | BODY MASS INDEX: 23.62 KG/M2 | HEART RATE: 63 BPM | DIASTOLIC BLOOD PRESSURE: 91 MMHG

## 2022-10-10 DIAGNOSIS — I42.1 HOCM (HYPERTROPHIC OBSTRUCTIVE CARDIOMYOPATHY): ICD-10-CM

## 2022-10-10 LAB
AV HCM GRAD VALS: 39 MMHG
AV LVOT PEAK GRADIENT: 10 MMHG
BH CV ECHO MEAS - AO MAX PG: 12.5 MMHG
BH CV ECHO MEAS - AO MEAN PG: 8.7 MMHG
BH CV ECHO MEAS - AO V2 MAX: 176.5 CM/SEC
BH CV ECHO MEAS - AO V2 VTI: 36.4 CM
BH CV ECHO MEAS - AVA(I,D): 3.2 CM2
BH CV ECHO MEAS - EDV(CUBED): 135.2 ML
BH CV ECHO MEAS - EDV(MOD-SP2): 89 ML
BH CV ECHO MEAS - EDV(MOD-SP4): 79 ML
BH CV ECHO MEAS - EF(MOD-BP): 62.3 %
BH CV ECHO MEAS - EF(MOD-SP2): 60.7 %
BH CV ECHO MEAS - EF(MOD-SP4): 62 %
BH CV ECHO MEAS - ESV(CUBED): 30.8 ML
BH CV ECHO MEAS - ESV(MOD-SP2): 35 ML
BH CV ECHO MEAS - ESV(MOD-SP4): 30 ML
BH CV ECHO MEAS - FS: 38.9 %
BH CV ECHO MEAS - IVS/LVPW: 1.07 CM
BH CV ECHO MEAS - IVSD: 1.15 CM
BH CV ECHO MEAS - LV DIASTOLIC VOL/BSA (35-75): 36.8 CM2
BH CV ECHO MEAS - LV MASS(C)D: 218.8 GRAMS
BH CV ECHO MEAS - LV MAX PG: 10.2 MMHG
BH CV ECHO MEAS - LV MEAN PG: 6.4 MMHG
BH CV ECHO MEAS - LV SYSTOLIC VOL/BSA (12-30): 14 CM2
BH CV ECHO MEAS - LV V1 MAX: 159.8 CM/SEC
BH CV ECHO MEAS - LV V1 VTI: 31.3 CM
BH CV ECHO MEAS - LVIDD: 5.1 CM
BH CV ECHO MEAS - LVIDS: 3.1 CM
BH CV ECHO MEAS - LVOT AREA: 3.7 CM2
BH CV ECHO MEAS - LVOT DIAM: 2.17 CM
BH CV ECHO MEAS - LVPWD: 1.07 CM
BH CV ECHO MEAS - SI(MOD-SP2): 25.2 ML/M2
BH CV ECHO MEAS - SI(MOD-SP4): 22.8 ML/M2
BH CV ECHO MEAS - SV(LVOT): 115.9 ML
BH CV ECHO MEAS - SV(MOD-SP2): 54 ML
BH CV ECHO MEAS - SV(MOD-SP4): 49 ML
MAXIMAL PREDICTED HEART RATE: 167 BPM
STRESS TARGET HR: 142 BPM

## 2022-10-10 PROCEDURE — 93321 DOPPLER ECHO F-UP/LMTD STD: CPT

## 2022-10-10 PROCEDURE — 93325 DOPPLER ECHO COLOR FLOW MAPG: CPT | Performed by: INTERNAL MEDICINE

## 2022-10-10 PROCEDURE — 93321 DOPPLER ECHO F-UP/LMTD STD: CPT | Performed by: INTERNAL MEDICINE

## 2022-10-10 PROCEDURE — 93308 TTE F-UP OR LMTD: CPT | Performed by: INTERNAL MEDICINE

## 2022-10-10 PROCEDURE — 93308 TTE F-UP OR LMTD: CPT

## 2022-10-10 PROCEDURE — 93325 DOPPLER ECHO COLOR FLOW MAPG: CPT

## 2022-10-19 ENCOUNTER — TELEPHONE (OUTPATIENT)
Dept: CARDIOLOGY | Facility: CLINIC | Age: 52
End: 2022-10-19

## 2022-10-19 NOTE — TELEPHONE ENCOUNTER
Pt is needing cardiac clearance to have left craniotomy for mass resection done on 10/31/22    I have placed the form in your in box for review and signature    Pt was last seen on 10/6/22 by Dr. Husain

## 2022-11-11 ENCOUNTER — TELEPHONE (OUTPATIENT)
Dept: CARDIOLOGY | Facility: CLINIC | Age: 52
End: 2022-11-11

## 2022-11-11 NOTE — TELEPHONE ENCOUNTER
[phone]    Indira  Called for her  jeb blas.   She said [facility] is in [facility]. He is having cardiac/pulmonary issues that she wants to speak to you about everything that's going on. He coded while he was there. Please advise. Thanks.

## 2022-12-08 ENCOUNTER — OFFICE VISIT (OUTPATIENT)
Dept: CARDIOLOGY | Facility: CLINIC | Age: 52
End: 2022-12-08

## 2022-12-08 VITALS
HEART RATE: 78 BPM | WEIGHT: 189 LBS | BODY MASS INDEX: 23.5 KG/M2 | HEIGHT: 75 IN | DIASTOLIC BLOOD PRESSURE: 78 MMHG | SYSTOLIC BLOOD PRESSURE: 116 MMHG

## 2022-12-08 DIAGNOSIS — I42.1 HOCM (HYPERTROPHIC OBSTRUCTIVE CARDIOMYOPATHY): Primary | ICD-10-CM

## 2022-12-08 DIAGNOSIS — E87.1 HYPONATREMIA: ICD-10-CM

## 2022-12-08 PROCEDURE — 99213 OFFICE O/P EST LOW 20 MIN: CPT | Performed by: INTERNAL MEDICINE

## 2022-12-08 PROCEDURE — 93000 ELECTROCARDIOGRAM COMPLETE: CPT | Performed by: INTERNAL MEDICINE

## 2022-12-08 RX ORDER — ATORVASTATIN CALCIUM 20 MG/1
20 TABLET, FILM COATED ORAL EVERY EVENING
Qty: 90 TABLET | Refills: 3
Start: 2022-12-08

## 2022-12-08 NOTE — PROGRESS NOTES
PATIENTINFORMATION    Date of Office Visit: 2022  Encounter Provider: Max Husain MD  Place of Service: [facility] CARDIOLOGY  Patient Name: Santhosh Pope  : 1970    Subjective:     Encounter Date:2022      Patient ID: Santhosh Pope is a 52 y.o. male.    Chief Complaint   Patient presents with   • Follow-up     HPI  Mr. Pope is a pleasant 51 yo man who came to cardiology clinic for fu visit accompanied by his wife.  He was readmitted for meningioma resection and stayed in the hospital for about a month in 2022.  He had postoperative complications with heart failure and small stroke and he needed extensive ICU stay on mechanical support and vasopressor support.  He was eventually discharged with tracheostomy.  Currently he is doing home physical therapy and can ambulate without any cane or walker.  No significant falls.  He has mild left lower extremity swelling otherwise denies any orthopnea, PND, palpitations, presyncope or syncope.  He is tolerating verapamil and metoprolol well.  He is on Eliquis for pulmonary emboli that he developed during recent hospital admission.    ROS  All systems reviewed and negative except as noted in HPI.    Past Medical History:   Diagnosis Date   • Abnormal ECG    • Allergic    • Anxiety    • Headache    • Heart murmur    • HOCM (hypertrophic obstructive cardiomyopathy) (HCC) 2021   • Hyperlipidemia    • Hypertension        Past Surgical History:   Procedure Laterality Date   • BRAIN SURGERY      When he was 16 y/o   • COLONOSCOPY     • ESOPHAGOSCOPY / EGD      x 2    • EYE SURGERY      x2   • LEG SURGERY     • STOMACH SURGERY         Social History     Socioeconomic History   • Marital status:    Tobacco Use   • Smoking status: Former     Packs/day: 1.00     Years: 19.00     Pack years: 19.00     Types: Cigarettes     Start date:      Quit date: 2006     Years since quittin.9   • Smokeless tobacco: Never  "  • Tobacco comments:     caffeine use    Vaping Use   • Vaping Use: Never used   Substance and Sexual Activity   • Alcohol use: Yes     Alcohol/week: 1.0 standard drink     Types: 1 Cans of beer per week     Comment: Socially   • Drug use: No   • Sexual activity: Yes       Family History   Problem Relation Age of Onset   • Cancer Mother    • Lupus Mother    • Heart disease Father    • Cancer Father    • Heart disease Brother            ECG 12 Lead    Date/Time: 12/8/2022 11:29 AM  Performed by: Max Husain MD  Authorized by: Max Husain MD   Comparison: compared with previous ECG from 10/7/2022  Similar to previous ECG  Rhythm: sinus rhythm  Rate: normal  Conduction: left bundle branch block  ST Segments: ST segments normal  T Waves: T waves normal  QRS axis: normal  Other: no other findings    Clinical impression: abnormal EKG               Objective:     /78 (BP Location: Left arm, Patient Position: Sitting)   Pulse 78   Ht 190.5 cm (75\")   Wt 85.7 kg (189 lb)   BMI 23.62 kg/m²  Body mass index is 23.62 kg/m².     Constitutional:       General: Not in acute distress.     Appearance: Well-developed. Not diaphoretic.   Eyes:      Pupils: Pupils are equal, round, and reactive to light.   HENT:      Head: Normocephalic and atraumatic.   Neck:      Thyroid: No thyromegaly.   Pulmonary:      Effort: Pulmonary effort is normal. No respiratory distress.      Breath sounds: Normal breath sounds. No wheezing. No rales.   Chest:      Chest wall: Not tender to palpatation.   Cardiovascular:      Normal rate. Regular rhythm.      No gallop.   Pulses:     Intact distal pulses.   Edema:     Peripheral edema absent.   Abdominal:      General: Bowel sounds are normal. There is no distension.      Palpations: Abdomen is soft.      Tenderness: There is no guarding.   Musculoskeletal: Normal range of motion.         General: No deformity.      Cervical back: Normal range of motion and neck supple. " Skin:     General: Skin is warm and dry.      Findings: No rash.   Neurological:      Mental Status: Alert and oriented to person, place, and time.      Cranial Nerves: No cranial nerve deficit.      Deep Tendon Reflexes: Reflexes are normal and symmetric.   Psychiatric:         Judgment: Judgment normal.         Review Of Data: I have reviewed pertinent recent labs, images and documents and pertinent findings included in HPI or assessment below.          Assessment/Plan:         1. HOCM as well as LVH-prior EKGs show LVH with strain pattern.  Recent EKGs showing left bundle branch block.  -Currently on verapamil and beta-blocker that he is tolerating well  -Normal myocardial perfusion study March 2020  -Stress echo done in July 2022 showing resting LVOT gradient of 35 and peak exercise 74 mmHg.  He exercised for 5 minutes without evidence for ischemia.  -Most recent hospitalization at [facility] in November 2022 complicated by post operative CHF with reduced left ventricular ejection fraction that recovered on follow-up echo during same admission.    2.  Hypertension-fairly controlled on verapamil and metoprolol.    3.  Left sphenoid wing meningioma with recurrence-he had extensive surgery in August 2022 but could not remove all tumor and treatment was followed by radiation and repeat surgery in November 2022.  Surgery was reportedly successful.  He is on antiseizure medication.     4.   Hypercholesterolemia-on atorvastatin.    5.  Family history of coronary artery disease-patient had negative myocardial perfusion study in March 2021    6.  Normal sleep study    7. His son diagnosed with HOCM per family screening recommendations     8. LBBB    9. PE on Eliquis       Mr. Pope is hemodynamically stable today.  Blood pressure and heart rate within normal range.  Baseline EKG is left bundle branch block.  I will continue same care with verapamil and metoprolol and he will return to clinic in 3 months for  repeat echocardiogram.  I will probably discontinue anticoagulation for PE during follow-up as PE was  provoked when he was sick on mechanical ventilator.    Diagnosis and plan of care discussed with patient and verbalized understanding.            Your medication list          Accurate as of December 8, 2022  3:28 PM. If you have any questions, ask your nurse or doctor.            CHANGE how you take these medications      Instructions Last Dose Given Next Dose Due   verapamil  MG CR tablet  Commonly known as: CALAN-SR  What changed: how much to take      Take 1 tablet by mouth 2 (Two) Times a Day.          CONTINUE taking these medications      Instructions Last Dose Given Next Dose Due   apixaban 5 MG tablet tablet  Commonly known as: ELIQUIS      Take 5 mg by mouth Every 12 (Twelve) Hours.       Aptiom 400 MG tablet  Generic drug: Eslicarbazepine Acetate      Take 400 mg by mouth Every Morning.       Aptiom 800 MG tablet tablet  Generic drug: Eslicarbazepine Acetate      Take 800 mg by mouth Every Night.       Aspirin 81 MG capsule      aspirin 81 mg capsule   Take 1 capsule every day by oral route.       atorvastatin 40 MG tablet  Commonly known as: LIPITOR      Take 40 mg by mouth Daily.       bisacodyl 5 MG EC tablet  Commonly known as: DULCOLAX      Take 5 mg by mouth Daily As Needed for Constipation.       levETIRAcetam 500 MG tablet  Commonly known as: KEPPRA      Take 1,500 mg by mouth 2 (Two) Times a Day.       metoprolol succinate XL 25 MG 24 hr tablet  Commonly known as: TOPROL-XL      Take 1 tablet by mouth Daily.       multivitamin with minerals tablet tablet      Take 1 tablet by mouth Daily.       omeprazole 40 MG capsule  Commonly known as: priLOSEC      Take 40 mg by mouth Daily.                  Max Husain MD  12/08/22  15:28 EST

## 2022-12-28 NOTE — TELEPHONE ENCOUNTER
[phone]  9:21 am    Indira called and lm that the hospitalist initially prescribed patients Eliquis and they now need an rx to [facility].  Please advise.    [facility]

## 2023-03-07 ENCOUNTER — OFFICE VISIT (OUTPATIENT)
Dept: CARDIOLOGY | Facility: CLINIC | Age: 53
End: 2023-03-07
Payer: COMMERCIAL

## 2023-03-07 VITALS
DIASTOLIC BLOOD PRESSURE: 100 MMHG | HEART RATE: 63 BPM | WEIGHT: 189.4 LBS | BODY MASS INDEX: 23.55 KG/M2 | HEIGHT: 75 IN | SYSTOLIC BLOOD PRESSURE: 160 MMHG

## 2023-03-07 DIAGNOSIS — I10 ESSENTIAL HYPERTENSION: ICD-10-CM

## 2023-03-07 DIAGNOSIS — E78.00 HYPERCHOLESTEROLEMIA: ICD-10-CM

## 2023-03-07 DIAGNOSIS — I42.1 HOCM (HYPERTROPHIC OBSTRUCTIVE CARDIOMYOPATHY): Primary | ICD-10-CM

## 2023-03-07 PROCEDURE — 93000 ELECTROCARDIOGRAM COMPLETE: CPT | Performed by: INTERNAL MEDICINE

## 2023-03-07 PROCEDURE — 99214 OFFICE O/P EST MOD 30 MIN: CPT | Performed by: INTERNAL MEDICINE

## 2023-03-07 RX ORDER — METOPROLOL SUCCINATE 50 MG/1
50 TABLET, EXTENDED RELEASE ORAL DAILY
Qty: 90 TABLET | Refills: 3
Start: 2023-03-07

## 2023-03-07 NOTE — PROGRESS NOTES
PATIENTINFORMATION    Date of Office Visit: 2023  Encounter Provider: Max Husain MD  Place of Service: [facility] CARDIOLOGY  Patient Name: Santhosh Pope  : 1970    Subjective:     Encounter Date:2023      Patient ID: Santhosh Pope is a 52 y.o. male.    No chief complaint on file.    HPI  Mr. Pope is a very pleasant 52 years old gentleman who came to cardiology clinic for follow-up visit.  He was accompanied by his wife Indira.  He denies any significant shortness of breath, orthopnea, PND, palpitations, presyncope or syncope or extremity swelling.  He is fairly active currently almost returning back to his baseline.  Compliant with current medications without any significant side effects.    No ER visits or hospitalizations since last clinic visit      ROS  All systems reviewed and negative except as noted in HPI.    Past Medical History:   Diagnosis Date   • Abnormal ECG    • Allergic    • Anxiety    • Headache    • Heart murmur    • HOCM (hypertrophic obstructive cardiomyopathy) (HCC) 2021   • Hyperlipidemia    • Hypertension        Past Surgical History:   Procedure Laterality Date   • BRAIN SURGERY      When he was 18 y/o   • COLONOSCOPY     • ESOPHAGOSCOPY / EGD      x 2    • EYE SURGERY      x2   • LEG SURGERY     • STOMACH SURGERY         Social History     Socioeconomic History   • Marital status:    Tobacco Use   • Smoking status: Former     Packs/day: 1.00     Years: 19.00     Pack years: 19.00     Types: Cigarettes     Start date:      Quit date: 2006     Years since quittin.1   • Smokeless tobacco: Never   • Tobacco comments:     caffeine use    Vaping Use   • Vaping Use: Never used   Substance and Sexual Activity   • Alcohol use: Yes     Alcohol/week: 1.0 standard drink     Types: 1 Cans of beer per week     Comment: Socially   • Drug use: No   • Sexual activity: Yes       Family History   Problem Relation Age of Onset   • Cancer  Mother    • Lupus Mother    • Heart disease Father    • Cancer Father    • Heart disease Brother            ECG 12 Lead    Date/Time: 3/7/2023 7:40 AM  Performed by: Max Husain MD  Authorized by: Max Husain MD   Comparison: compared with previous ECG from 12/8/2023  Similar to previous ECG  Rhythm: sinus rhythm  Rate: normal  Conduction: left bundle branch block  ST Segments: ST segments normal  T Waves: T waves normal  QRS axis: normal  Other: no other findings    Clinical impression: abnormal EKG               Objective:     There were no vitals taken for this visit. There is no height or weight on file to calculate BMI.     Constitutional:       General: Not in acute distress.     Appearance: Well-developed. Not diaphoretic.   Eyes:      Pupils: Pupils are equal, round, and reactive to light.   HENT:      Head: Normocephalic and atraumatic.   Neck:      Thyroid: No thyromegaly.   Pulmonary:      Effort: Pulmonary effort is normal. No respiratory distress.      Breath sounds: Normal breath sounds. No wheezing. No rales.   Chest:      Chest wall: Not tender to palpatation.   Cardiovascular:      Normal rate. Regular rhythm.      No gallop.   Pulses:     Intact distal pulses.   Edema:     Peripheral edema absent.   Abdominal:      General: Bowel sounds are normal. There is no distension.      Palpations: Abdomen is soft.      Tenderness: There is no guarding.   Musculoskeletal: Normal range of motion.         General: No deformity.      Cervical back: Normal range of motion and neck supple. Skin:     General: Skin is warm and dry.      Findings: No rash.   Neurological:      Mental Status: Alert and oriented to person, place, and time.      Cranial Nerves: No cranial nerve deficit.      Deep Tendon Reflexes: Reflexes are normal and symmetric.   Psychiatric:         Judgment: Judgment normal.         Review Of Data: I have reviewed pertinent recent labs, images and documents and pertinent  findings included in HPI or assessment below.          Assessment/Plan:         1. HOCM as well as LVH-prior EKGs show LVH with strain pattern.  Recent EKGs showing left bundle branch block.   -Currently on verapamil and beta-blocker that he is tolerating well  -Normal myocardial perfusion study March 2020  -Stress echo done in July 2022 showing resting LVOT gradient of 35 and peak exercise 74 mmHg.  He exercised for 5 minutes without evidence for ischemia.  -Most recent hospitalization at [facility] for brain tumor surgery  in November 2022 complicated by post operative CHF with reduced left ventricular ejection fraction that recovered on follow-up echo during same admission.  -No significant murmur appreciated on exam.  -Today no significant symptoms and almost back to baseline.  His blood pressures running on the higher side and I have increased metoprolol to 50 mg p.o. daily.  He will check his BP and call clinic in 2 weeks.     2.  Hypertension-BP in office today 160/90-never had significantly elevated blood pressure.  He reports being excited.  I have increased metoprolol to 50 mg p.o. daily.  He will check his blood pressure at home and call back with logs.     3.  Left sphenoid wing meningioma with recurrence-he had extensive surgery in August 2022 but could not remove all tumor and treatment was followed by radiation and repeat surgery in November 2022.  Surgery was reportedly successful.  He is on antiseizure medication.     4.   Hypercholesterolemia-on atorvastatin.     5.  Family history of coronary artery disease-he had negative myocardial perfusion study in March 2021     6.  Normal sleep study     7. His son diagnosed with HOCM per family screening recommendations      8. LBBB     9. PE on Eliquis  -provoked PE while he was admitted and treated in ICU following neurosurgery.  Took Eliquis for more than 3 months.  He is back to baseline activity  Discontinue Eliquis.    Return to clinic  in 6 months or sooner with any concerning symptoms  Diagnosis and plan of care discussed with patient and verbalized understanding.            Your medication list          Accurate as of March 7, 2023  7:42 AM. If you have any questions, ask your nurse or doctor.            CHANGE how you take these medications      Instructions Last Dose Given Next Dose Due   verapamil  MG CR tablet  Commonly known as: CALAN-SR  What changed: how much to take      Take 1 tablet by mouth 2 (Two) Times a Day.          CONTINUE taking these medications      Instructions Last Dose Given Next Dose Due   apixaban 5 MG tablet tablet  Commonly known as: ELIQUIS      Take 1 tablet by mouth Every 12 (Twelve) Hours.       Aptiom 400 MG tablet  Generic drug: Eslicarbazepine Acetate      Take 400 mg by mouth Every Morning.       Aptiom 800 MG tablet tablet  Generic drug: Eslicarbazepine Acetate      Take 800 mg by mouth Every Night.       Aspirin 81 MG capsule      aspirin 81 mg capsule   Take 1 capsule every day by oral route.       atorvastatin 20 MG tablet  Commonly known as: LIPITOR      Take 1 tablet by mouth Every Evening.       bisacodyl 5 MG EC tablet  Commonly known as: DULCOLAX      Take 5 mg by mouth Daily As Needed for Constipation.       levETIRAcetam 500 MG tablet  Commonly known as: KEPPRA      Take 1,500 mg by mouth 2 (Two) Times a Day.       metoprolol succinate XL 25 MG 24 hr tablet  Commonly known as: TOPROL-XL      Take 1 tablet by mouth Daily.       multivitamin with minerals tablet tablet      Take 1 tablet by mouth Daily.       omeprazole 40 MG capsule  Commonly known as: priLOSEC      Take 40 mg by mouth Daily.                  Max Husain MD  03/07/23  07:42 EST

## 2023-04-13 RX ORDER — METOPROLOL SUCCINATE 50 MG/1
50 TABLET, EXTENDED RELEASE ORAL DAILY
Qty: 90 TABLET | Refills: 3
Start: 2023-04-13

## 2023-04-17 RX ORDER — METOPROLOL SUCCINATE 50 MG/1
50 TABLET, EXTENDED RELEASE ORAL DAILY
Qty: 90 TABLET | Refills: 3 | Status: SHIPPED | OUTPATIENT
Start: 2023-04-17

## 2023-09-06 ENCOUNTER — TELEPHONE (OUTPATIENT)
Dept: CARDIOLOGY | Facility: CLINIC | Age: 53
End: 2023-09-06
Payer: COMMERCIAL

## 2023-09-06 NOTE — TELEPHONE ENCOUNTER
Caller: EMRE MILLER    Relationship to patient: Emergency Contact    Best call back number:  [phone]    Patient is needing: PATIENT'S WIFE EMRE REPORTING PATIENT HAS BEEN FEELING FATIGUE AND SHORTNESS OF BREATH. PATIENT HAS TWO NEW TUMORS BEHIND HIS LEFT EYE AND WIFE FEELS THAT HIS SHORTNESS OF BREATH AND FATIGUE COULD BE CONTRIBUTED TO TREATMENT.

## 2023-09-06 NOTE — TELEPHONE ENCOUNTER
Increased cough for past 2-3 days. Feels increasing episodes of dizziness and lightheadedness with movement. After one episode his BP was around 110 systolic 30 minutes later.     I talked with wife, ej, on phone. Increase fluids. Start taking verapamil at night time. Keep BP log to bring to visit with Dr. LANE.    He was started on lexapro in the past few months. Consider for symptoms of dizziness. Will continue for now

## 2023-09-06 NOTE — TELEPHONE ENCOUNTER
Spoke to wife. She said patient seems to be more SOA on exertion and he is complaining of some dizziness. He is also fatigued and not as active as he use to be. However, patient did just find out that he has 2 new tumors. They have talked to the surgeon about his fatigue and they state he is just deconditioned d/t previous surgeries. His weight has been staying 189-192lbs. Wife has not noticed any swelling. BP on average has been about 120s/80s sometimes it can be 135-140 systolic. He does not check it everyday. He did check it though last night 30 mins after he had a dizzy spell and it was 110/80. HR has been in the 70s. Wife also thinks that he is not drinking enough fluids either. He is taking verapamil 120mg BID and metoprolol 50mg daily.     Sommer Max RN  Triage [facility] [place]

## 2023-09-28 ENCOUNTER — OFFICE VISIT (OUTPATIENT)
Dept: CARDIOLOGY | Facility: CLINIC | Age: 53
End: 2023-09-28
Payer: COMMERCIAL

## 2023-09-28 VITALS
HEART RATE: 69 BPM | OXYGEN SATURATION: 98 % | HEIGHT: 75 IN | SYSTOLIC BLOOD PRESSURE: 110 MMHG | BODY MASS INDEX: 24.25 KG/M2 | DIASTOLIC BLOOD PRESSURE: 70 MMHG | WEIGHT: 195 LBS

## 2023-09-28 DIAGNOSIS — E78.00 HYPERCHOLESTEROLEMIA: ICD-10-CM

## 2023-09-28 DIAGNOSIS — I42.1 HOCM (HYPERTROPHIC OBSTRUCTIVE CARDIOMYOPATHY): Primary | ICD-10-CM

## 2023-09-28 DIAGNOSIS — I10 ESSENTIAL HYPERTENSION: ICD-10-CM

## 2023-09-28 PROCEDURE — 99214 OFFICE O/P EST MOD 30 MIN: CPT | Performed by: INTERNAL MEDICINE

## 2023-09-28 RX ORDER — ESCITALOPRAM OXALATE 10 MG/1
1 TABLET ORAL DAILY
COMMUNITY
Start: 2023-04-27

## 2023-09-28 NOTE — PROGRESS NOTES
PATIENTINFORMATION    Date of Office Visit: 2023  Encounter Provider: Max Husain MD  Place of Service: [facility] CARDIOLOGY  Patient Name: Santhosh Pope  : 1970    Subjective:     Encounter Date:2023      Patient ID: Santhosh Pope is a 53 y.o. male.    No chief complaint on file.    HPI  Mr. Pope is a pleasant 53 years old gentleman came to cardiology clinic for follow-up visit.  He reports intermittent fatigue and occasional lightheadedness but denies fainting, chest pain, significant shortness of breath, orthopnea, PND or extremity swelling.  Blood pressure better with the split dosing of verapamil.  Currently on verapamil twice daily and daily metoprolol.  No ER visits or hospitalizations since last clinic visit      ROS  All systems reviewed and negative except as noted in HPI.    Past Medical History:   Diagnosis Date    Abnormal ECG     Allergic     Anxiety     Headache     Heart murmur     HOCM (hypertrophic obstructive cardiomyopathy) 2021    Hyperlipidemia     Hypertension        Past Surgical History:   Procedure Laterality Date    BRAIN SURGERY      When he was 18 y/o    COLONOSCOPY      ESOPHAGOSCOPY / EGD      x 2     EYE SURGERY      x2    LEG SURGERY      STOMACH SURGERY         Social History     Socioeconomic History    Marital status:    Tobacco Use    Smoking status: Former     Packs/day: 1.00     Years: 19.00     Pack years: 19.00     Types: Cigarettes     Start date:      Quit date:      Years since quittin.7    Smokeless tobacco: Never    Tobacco comments:     caffeine use    Vaping Use    Vaping Use: Never used   Substance and Sexual Activity    Alcohol use: Yes     Alcohol/week: 1.0 standard drink     Types: 1 Cans of beer per week     Comment: Socially    Drug use: No    Sexual activity: Yes       Family History   Problem Relation Age of Onset    Cancer Mother     Lupus Mother     Heart disease Father     Cancer  "Father     Heart disease Brother          Procedures       Objective:     /70   Pulse 69   Ht 190.5 cm (75\")   Wt 88.5 kg (195 lb)   SpO2 98%   BMI 24.37 kg/m²  Body mass index is 24.37 kg/m².     Constitutional:       General: Not in acute distress.     Appearance: Well-developed. Not diaphoretic.   Eyes:      Pupils: Pupils are equal, round, and reactive to light.   HENT:      Head: Normocephalic and atraumatic.   Neck:      Thyroid: No thyromegaly.   Pulmonary:      Effort: Pulmonary effort is normal. No respiratory distress.      Breath sounds: Normal breath sounds. No wheezing. No rales.   Chest:      Chest wall: Not tender to palpatation.   Cardiovascular:      Normal rate. Regular rhythm.      Murmurs: There is a high frequency blowing holosystolic murmur at the apex.      No gallop.    Pulses:     Intact distal pulses.   Edema:     Peripheral edema absent.   Abdominal:      General: Bowel sounds are normal. There is no distension.      Palpations: Abdomen is soft.      Tenderness: There is no guarding.   Musculoskeletal: Normal range of motion.         General: No deformity.      Cervical back: Normal range of motion and neck supple. Skin:     General: Skin is warm and dry.      Findings: No rash.   Neurological:      Mental Status: Alert and oriented to person, place, and time.      Cranial Nerves: No cranial nerve deficit.      Deep Tendon Reflexes: Reflexes are normal and symmetric.   Psychiatric:         Judgment: Judgment normal.       Review Of Data: I have reviewed pertinent recent labs, images and documents and pertinent findings included in HPI or assessment below.          Assessment/Plan:             HOCM as well as LVH-prior EKGs show LVH with strain pattern.  EKG is showing left bundle branch block.Currently on verapamil and beta-blocker . Normal myocardial perfusion study March 2020. Stress echo done in July 2022 showing resting LVOT gradient of 35 and peak exercise 74 mmHg.  He " exercised for 5 minutes without evidence for ischemia.  Hypertension-controlled  Left sphenoid wing meningioma with recurrence-he had extensive surgery in August 2022 but could not remove all tumor and treatment was followed by radiation and repeat surgery in November 2022.  Surgery was reportedly successful.He is on antiseizure medication.  Follows up with Psychiatric neurosurgery.  Hypercholesterolemia-on atorvastatin.  Family history of coronary artery disease-he had negative myocardial perfusion study in March 2021  Normal sleep study  His son diagnosed with HOCM per family screening recommendations   LBBB  PE treated with Eliquis  -provoked PE while he was admitted and treated in ICU following neurosurgery.  Off Eliquis now.    Mr. Pope having intermittent fatigue and lightheadedness.  Today has new systolic ejection murmur at right second intercostal space and murmur consistent with MR and apical area that is new since prior visit.  Concerning for worsening LVOT obstruction.  Could not uptitrate current medications because of low blood pressure readings.  May consider mavacamten with worsening LVOT obstruction.  Repeat echo.    Diagnosis and plan of care discussed with patient and verbalized understanding.            Your medication list            Accurate as of September 28, 2023 12:56 PM. If you have any questions, ask your nurse or doctor.                CONTINUE taking these medications        Instructions Last Dose Given Next Dose Due   Aptiom 400 MG tablet  Generic drug: Eslicarbazepine Acetate      Take 2 tablets by mouth Every Morning.       Aptiom 800 MG tablet tablet  Generic drug: Eslicarbazepine Acetate      Take 1 tablet by mouth Every Night.       Aspirin 81 MG capsule      aspirin 81 mg capsule   Take 1 capsule every day by oral route.       atorvastatin 20 MG tablet  Commonly known as: LIPITOR      Take 1 tablet by mouth Every Evening.       bisacodyl 5 MG EC tablet  Commonly  known as: DULCOLAX      Take 1 tablet by mouth Daily As Needed for Constipation.       escitalopram 10 MG tablet  Commonly known as: LEXAPRO      Take 1 tablet by mouth Daily.       levETIRAcetam 500 MG tablet  Commonly known as: KEPPRA      Take 1 tablet by mouth 2 (Two) Times a Day.       metoprolol succinate XL 50 MG 24 hr tablet  Commonly known as: TOPROL-XL      Take 1 tablet by mouth Daily.       metoprolol succinate XL 50 MG 24 hr tablet  Commonly known as: TOPROL-XL      Take 1 tablet by mouth Daily.       multivitamin with minerals tablet tablet      Take 1 tablet by mouth Daily.       omeprazole 40 MG capsule  Commonly known as: priLOSEC      Take 1 capsule by mouth Daily.       verapamil  MG CR tablet  Commonly known as: CALAN-SR      Take 1 tablet by mouth 2 (Two) Times a Day.                    Max Husain MD  09/28/23  12:56 EDT

## 2023-10-05 ENCOUNTER — HOSPITAL ENCOUNTER (OUTPATIENT)
Dept: CARDIOLOGY | Facility: HOSPITAL | Age: 53
Discharge: HOME | End: 2023-10-05
Admitting: INTERNAL MEDICINE
Payer: COMMERCIAL

## 2023-10-05 VITALS
BODY MASS INDEX: 24.12 KG/M2 | WEIGHT: 194 LBS | HEART RATE: 62 BPM | SYSTOLIC BLOOD PRESSURE: 166 MMHG | HEIGHT: 75 IN | DIASTOLIC BLOOD PRESSURE: 85 MMHG

## 2023-10-05 DIAGNOSIS — I42.1 HOCM (HYPERTROPHIC OBSTRUCTIVE CARDIOMYOPATHY): ICD-10-CM

## 2023-10-05 LAB
BH CV ECHO LEFT VENTRICLE GLOBAL LONGITUDINAL STRAIN: -19.2 %
BH CV ECHO MEAS - AO MAX PG: 21.2 MMHG
BH CV ECHO MEAS - AO MEAN PG: 11 MMHG
BH CV ECHO MEAS - AO V2 MAX: 230 CM/SEC
BH CV ECHO MEAS - AO V2 VTI: 50.6 CM
BH CV ECHO MEAS - EDV(CUBED): 132.7 ML
BH CV ECHO MEAS - EDV(MOD-SP2): 83 ML
BH CV ECHO MEAS - EDV(MOD-SP4): 117 ML
BH CV ECHO MEAS - EF(MOD-BP): 75.5 %
BH CV ECHO MEAS - EF(MOD-SP2): 74.7 %
BH CV ECHO MEAS - EF(MOD-SP4): 75.2 %
BH CV ECHO MEAS - ESV(CUBED): 42.2 ML
BH CV ECHO MEAS - ESV(MOD-SP2): 21 ML
BH CV ECHO MEAS - ESV(MOD-SP4): 29 ML
BH CV ECHO MEAS - FS: 31.8 %
BH CV ECHO MEAS - IVS/LVPW: 1.64 CM
BH CV ECHO MEAS - IVSD: 1.8 CM
BH CV ECHO MEAS - LV DIASTOLIC VOL/BSA (35-75): 54.1 CM2
BH CV ECHO MEAS - LV MASS(C)D: 316.2 GRAMS
BH CV ECHO MEAS - LV SYSTOLIC VOL/BSA (12-30): 13.4 CM2
BH CV ECHO MEAS - LVIDD: 5.1 CM
BH CV ECHO MEAS - LVIDS: 3.5 CM
BH CV ECHO MEAS - LVOT AREA: 5 CM2
BH CV ECHO MEAS - LVOT DIAM: 2.5 CM
BH CV ECHO MEAS - LVPWD: 1.1 CM
BH CV ECHO MEAS - MV A MAX VEL: 82.3 CM/SEC
BH CV ECHO MEAS - MV DEC SLOPE: 359 CM/SEC2
BH CV ECHO MEAS - MV DEC TIME: 0.22 SEC
BH CV ECHO MEAS - MV E MAX VEL: 64.5 CM/SEC
BH CV ECHO MEAS - MV E/A: 0.78
BH CV ECHO MEAS - MV MAX PG: 2.9 MMHG
BH CV ECHO MEAS - MV MEAN PG: 0.91 MMHG
BH CV ECHO MEAS - MV P1/2T: 64.1 MSEC
BH CV ECHO MEAS - MV V2 VTI: 22.7 CM
BH CV ECHO MEAS - MVA(P1/2T): 3.4 CM2
BH CV ECHO MEAS - PA ACC TIME: 0.08 SEC
BH CV ECHO MEAS - PA V2 MAX: 119 CM/SEC
BH CV ECHO MEAS - RAP SYSTOLE: 3 MMHG
BH CV ECHO MEAS - RV MAX PG: 3.1 MMHG
BH CV ECHO MEAS - RV V1 MAX: 88.6 CM/SEC
BH CV ECHO MEAS - RV V1 VTI: 21.4 CM
BH CV ECHO MEAS - RVOT DIAM: 3 CM
BH CV ECHO MEAS - RVSP: 21 MMHG
BH CV ECHO MEAS - SI(MOD-SP2): 28.7 ML/M2
BH CV ECHO MEAS - SI(MOD-SP4): 40.7 ML/M2
BH CV ECHO MEAS - SV(MOD-SP2): 62 ML
BH CV ECHO MEAS - SV(MOD-SP4): 88 ML
BH CV ECHO MEAS - SV(RVOT): 152.7 ML
BH CV ECHO MEAS - TR MAX PG: 18.9 MMHG
BH CV ECHO MEAS - TR MAX VEL: 217.5 CM/SEC
BH CV XLRA - RV BASE: 3.2 CM
BH CV XLRA - RV LENGTH: 8.7 CM
BH CV XLRA - RV MID: 3.2 CM
BH CV XLRA - TDI S': 12 CM/SEC
LEFT ATRIUM VOLUME INDEX: 28.4 ML/M2
SINUS: 3.4 CM

## 2023-10-05 PROCEDURE — 93306 TTE W/DOPPLER COMPLETE: CPT

## 2023-10-05 PROCEDURE — 25510000001 PERFLUTREN (DEFINITY) 8.476 MG IN SODIUM CHLORIDE (PF) 0.9 % 10 ML INJECTION: Performed by: INTERNAL MEDICINE

## 2023-10-05 PROCEDURE — 93356 MYOCRD STRAIN IMG SPCKL TRCK: CPT

## 2023-10-05 RX ADMIN — SODIUM CHLORIDE 2 ML: 9 INJECTION INTRAMUSCULAR; INTRAVENOUS; SUBCUTANEOUS at 14:27

## 2023-10-11 DIAGNOSIS — I42.1 HOCM (HYPERTROPHIC OBSTRUCTIVE CARDIOMYOPATHY): Primary | ICD-10-CM

## 2023-10-24 ENCOUNTER — HOSPITAL ENCOUNTER (OUTPATIENT)
Dept: CARDIOLOGY | Facility: HOSPITAL | Age: 53
Discharge: HOME | End: 2023-10-24
Payer: COMMERCIAL

## 2023-10-24 VITALS
WEIGHT: 197.4 LBS | HEART RATE: 63 BPM | HEIGHT: 75 IN | HEIGHT: 75 IN | SYSTOLIC BLOOD PRESSURE: 151 MMHG | BODY MASS INDEX: 24.54 KG/M2 | BODY MASS INDEX: 24.54 KG/M2 | OXYGEN SATURATION: 97 % | DIASTOLIC BLOOD PRESSURE: 87 MMHG | SYSTOLIC BLOOD PRESSURE: 151 MMHG | WEIGHT: 197.4 LBS | HEART RATE: 63 BPM | DIASTOLIC BLOOD PRESSURE: 87 MMHG | OXYGEN SATURATION: 97 %

## 2023-10-24 DIAGNOSIS — I27.20 PULMONARY HYPERTENSION: Primary | ICD-10-CM

## 2023-10-24 DIAGNOSIS — I42.1 HOCM (HYPERTROPHIC OBSTRUCTIVE CARDIOMYOPATHY): Primary | ICD-10-CM

## 2023-10-24 DIAGNOSIS — R00.2 PALPITATIONS: ICD-10-CM

## 2023-10-24 DIAGNOSIS — I42.1 HOCM (HYPERTROPHIC OBSTRUCTIVE CARDIOMYOPATHY): ICD-10-CM

## 2023-10-24 PROCEDURE — 93246 EXT ECG>7D<15D RECORDING: CPT

## 2023-10-24 PROCEDURE — 99214 OFFICE O/P EST MOD 30 MIN: CPT | Performed by: INTERNAL MEDICINE

## 2023-10-24 PROCEDURE — 94618 PULMONARY STRESS TESTING: CPT

## 2023-10-24 RX ORDER — PANTOPRAZOLE SODIUM 40 MG/1
40 TABLET, DELAYED RELEASE ORAL DAILY
Start: 2023-10-24

## 2023-10-24 NOTE — LETTER
October 24, 2023     Max Husain MD  [address]  [address]  [facility] [address]    Patient: Santhosh Pope   YOB: 1970   Date of Visit: 10/24/2023       Dear Max Husain MD:    Thank you for referring Santhosh Pope to me for evaluation. Below are the relevant portions of my assessment and plan of care.    Encounter Diagnosis and Orders:  Diagnoses and all orders for this visit:    1. Pulmonary hypertension (Primary)    2. HOCM (hypertrophic obstructive cardiomyopathy)    3. Palpitations  -     Holter Monitor - 72 Hour Up To 15 Days    Other orders  -     pantoprazole (Protonix) 40 MG EC tablet; Take 1 tablet by mouth Daily.        If you have questions, please do not hesitate to call me. I look forward to following Santhosh along with you.         Sincerely,        James Yusuf MD PhD        CC: No Recipients

## 2023-10-24 NOTE — ADDENDUM NOTE
Encounter addended by: Prabha Foy MA on: 10/24/2023 10:52 AM   Actions taken: Charge Capture section accepted

## 2023-10-24 NOTE — LETTER
2023       No Recipients    Patient: Santhosh Pope   YOB: 1970   Date of Visit: 10/24/2023     Dear Dr. Sandoval Recipients:    Thank you for referring Santhosh Pope to me for evaluation. Below are the relevant portions of my assessment and plan of care.    If you have questions, please do not hesitate to call me. I look forward to following Santhosh along with you.         Sincerely,        PHARMACIST [facility]        CC:   No Recipients      Progress Notes:  [facility] Failure Clinic      Patient Name: Santhosh Pope  :1970  Age: 53 y.o.  Sex: male  Referring Provider: Max Husain MD   Primary Cardiologist: Max Husain MD  Encounter Provider: Zahraa Garcia RPH      Chief Complaint:   Chief Complaint   Patient presents with   • pulmonary hypertension       HPI:  Patient presents for their initial visit. 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 He was diagnosed with HOCM in 2021. Patient follows up with [facility] oncology for meningioma and seizures. He currently takes levetiracetam and Aptiom (eslicarbazepine) for seizures.       Current Regimen:  CV Meds  Verapamil  mg CR BID  Metoprolol succinate XL 50 mg daily  Atorvastatin 20 mg daily  Aspirin 81 mg daily      Medication Use:  Adherence: Good. Missed 0 doses in the last week. Adherence tools used include: pillbox. 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  Wt Readings from Last 1 Encounters:   10/24/23 89.5 kg (197 lb 6.4 oz)       Lab Review:  Renal Function: CrCl cannot be calculated (Patient's most recent lab result is older than the maximum 30 days allowed.).    Lab Results   Component Value Date    PROBNP 72.7 07/27/2021       Results for orders placed during the hospital encounter of 10/05/23    Adult Transthoracic Echo Complete w/ Color, Spectral and Contrast if necessary per protocol    Interpretation Summary  •  Left ventricular systolic function is hyperdynamic (EF > 70%). Calculated left ventricular EF = 75.5%  •  Left ventricular wall thickness is consistent with mild concentric hypertrophy. Sigmoid-shaped ventricular septum is present.  There is a resting gradient in the left ventricular outflow tract of 24 mmHg which goes up to 57 mmHg with Valsalva.  •  The following left ventricular wall segments are hyperkinetic: basal anterior, basal anterolateral, basal inferolateral, basal inferior, basal inferoseptal, basal anteroseptal, mid anterior, mid anterolateral, mid inferolateral, mid inferior, mid inferoseptal, mid anteroseptal, apical anterior, apical lateral, apical inferior, apical septal and apex.  •  Left ventricular diastolic function was indeterminate.  •  Estimated right ventricular systolic pressure from tricuspid regurgitation is normal (<35 mmHg). Calculated right ventricular systolic pressure from tricuspid regurgitation is 21 mmHg.          ASSESSMENT/PLAN OF CARE:    [facility]      Thank you for allowing me to participate in the care of your patient,         Zahraa Radha, [facility] Failure [facility]  10/24/23  09:47 EDT       "

## 2023-10-24 NOTE — PROGRESS NOTES
Heart Failure & Pulmonary Arterial Hypertension Clinic  [facility]  James Yusuf M.D., Ph.D., [facility]       Max Husain MD  [address] [address]    Thank you for asking me to see Santhosh Pope.     History of Present Illness  This is a 53 y.o. male with:    1. PH by abnormal PA AT  2. HOCM      Santhosh Pope is a 53 y.o. male who presents today.  The patient is typically seen by Billy Kim MD.  The patient's cardiologist is Dr. Husain.     The patient presents today for further evaluation and management of pulmonary hypertension and HOCM.  Patient's most recent 2D TTE had an abnormal PA AT consistent with pulmonary hypertension.  He does have a history of HCM.  He has had exercise intolerance and exertional dyspnea.  He recently saw his primary cardiologist who detected a JUAN consistent with worsening MR. A 2D TTE at that time showed HOCM with a peak LVOT gradient greater than 50 mmHg.  Of note, the patient is currently prescribed verapamil  mg 1 tablet p.o. twice daily and Toprol-XL 50 mg 1 tablet daily.  They have been unable to up-titrate his medications further because of hemodynamics.    Of note, the patient has a history of a sphenoid wing meningioma that was resected in November 2022 after SRS and an initial resection dated March 2022.  The patient saw neurosurgery at [facility] on August 3, 2023 and had reported a seizure episode.  The patient is on Aptiom as an AED.       Review of Systems - Review of Systems   Cardiovascular:  Positive for dyspnea on exertion.   Respiratory:  Positive for shortness of breath.    All other systems reviewed and are negative.        All other systems were reviewed and were negative.    Patient Active Problem List   Diagnosis    HOCM (hypertrophic obstructive cardiomyopathy)    Essential hypertension    Hypercholesterolemia    Brain tumor    Cerebral edema    Pulmonary hypertension        family history includes Cancer in his father and mother; Heart disease in his brother and father; Lupus in his mother.     reports that he quit smoking about 17 years ago. His smoking use included cigarettes. He started smoking about 36 years ago. He has a 19.00 pack-year smoking history. He has never used smokeless tobacco. He reports current alcohol use of about 1.0 standard drink of alcohol per week. He reports that he does not use drugs.    No Known Allergies    Social Determinants of Health     Tobacco Use: Medium Risk (10/24/2023)    Patient History     Smoking Tobacco Use: Former     Smokeless Tobacco Use: Never     Passive Exposure: Not on file   Alcohol Use: Not on file   Financial Resource Strain: Not on file   Food Insecurity: Not on file   Transportation Needs: Not on file   Physical Activity: Not on file   Stress: Not on file   Social Connections: Unknown (10/8/2023)    Family and Community Support     Help with Day-to-Day Activities: Not on file     Lonely or Isolated: Not on file   Interpersonal Safety: Unknown (10/8/2023)    Abuse Screen     Unsafe at Home or Work/School: Not on file     Feels Threatened by Someone?: Not on file     Does Anyone Keep You from Contacting Others or Doint Things Outside the Home?: Not on file     Physical Sign of Abuse Present: Not on file   Depression: Not on file   Housing Stability: Unknown (10/8/2023)    Housing Stability     Current Living Arrangements: Not on file     Potentially Unsafe Housing Conditions: Not on file   Utilities: Not on file   Health Literacy: Unknown (10/8/2023)    Education     Help with school or training?: Not on file     Preferred Language: Not on file   Employment: Unknown (10/8/2023)    Employment     Do you want help finding or keeping work or a job?: Not on file   Disabilities: Unknown (10/8/2023)    Disabilities     Concentrating, Remembering, or Making Decisions Difficulty: Not on file     Doing Errands Independently Difficulty:  Not on file        Physical Activity: Not on file          Current Outpatient Medications:     Aptiom 800 MG tablet tablet, Take 0.5 tablets by mouth Every Night., Disp: , Rfl:     Aspirin 81 MG capsule, aspirin 81 mg capsule  Take 1 capsule every day by oral route., Disp: , Rfl:     atorvastatin (LIPITOR) 20 MG tablet, Take 1 tablet by mouth Every Evening., Disp: 90 tablet, Rfl: 3    escitalopram (LEXAPRO) 10 MG tablet, Take 1 tablet by mouth Daily., Disp: , Rfl:     levETIRAcetam (KEPPRA) 500 MG tablet, Take 1 tablet by mouth 2 (Two) Times a Day., Disp: , Rfl:     metoprolol succinate XL (TOPROL-XL) 50 MG 24 hr tablet, Take 1 tablet by mouth Daily., Disp: 90 tablet, Rfl: 3    multivitamin with minerals tablet tablet, Take 1 tablet by mouth Daily., Disp: , Rfl:     verapamil SR (CALAN-SR) 120 MG CR tablet, TAKE 1 TABLET TWICE A DAY, Disp: 180 tablet, Rfl: 1    pantoprazole (Protonix) 40 MG EC tablet, Take 1 tablet by mouth Daily., Disp: , Rfl:     Current Facility-Administered Medications:     nitroglycerin (NITROSTAT) SL tablet 0.4 mg, 0.4 mg, Sublingual, Q5 Min PRN, Max Husain MD    sodium chloride 0.9 % flush 10 mL, 10 mL, Intravenous, PRN, Max Husain MD    Vital Sign Review:     Vitals:    10/24/23 0941   BP: 151/87   Pulse: 63   SpO2: 97%     PP:high  Pulse Ox: normal oxygenation on room air    Body mass index is 24.8 kg/m².      10/24/23  0941   Weight: 89.5 kg (197 lb 6.4 oz)       Physical Exam:    Vitals reviewed.   Constitutional:       General: Not in acute distress.     Appearance: Normal and healthy appearance. Well-developed, well-groomed and not in distress. Not ill-appearing, toxic-appearing or diaphoretic.      Interventions: Not intubated.  Eyes:      Conjunctiva/sclera: Conjunctivae normal.      Pupils: Pupils are equal, round, and reactive to light.   Neck:      Vascular: No hepatojugular reflux, JVD or JVR. JVD normal with 6 cm of water.   Pulmonary:      Effort: Pulmonary  effort is normal. No tachypnea, bradypnea, accessory muscle usage, prolonged expiration, respiratory distress or retractions. Not intubated.      Breath sounds: Normal breath sounds and air entry. No stridor, decreased air movement or transmitted upper airway sounds. No decreased breath sounds. No wheezing. No rhonchi. No rales.   Cardiovascular:      PMI at left midclavicular line. Normal rate. Regular rhythm. S1 with normal intensity. loud S2.       Murmurs: There is a grade 1/6 harsh early systolic murmur at the URSB and ULSB. There is also a grade 2/6 low frequency blowing holosystolic murmur at the apex.      No gallop.  No click. No rub.   Neurological:      General: No focal deficit present.      Mental Status: Alert and oriented to person, place and time.   Psychiatric:         Behavior: Behavior is cooperative.            DATA REVIEWED:       ---------------------------------------------------  TTE/PATRICK:    Results for orders placed during the hospital encounter of 10/05/23    Adult Transthoracic Echo Complete w/ Color, Spectral and Contrast if necessary per protocol    Interpretation Summary    Left ventricular systolic function is hyperdynamic (EF > 70%). Calculated left ventricular EF = 75.5%    Left ventricular wall thickness is consistent with mild concentric hypertrophy. Sigmoid-shaped ventricular septum is present.  There is a resting gradient in the left ventricular outflow tract of 24 mmHg which goes up to 57 mmHg with Valsalva.    The following left ventricular wall segments are hyperkinetic: basal anterior, basal anterolateral, basal inferolateral, basal inferior, basal inferoseptal, basal anteroseptal, mid anterior, mid anterolateral, mid inferolateral, mid inferior, mid inferoseptal, mid anteroseptal, apical anterior, apical lateral, apical inferior, apical septal and apex.    Left ventricular diastolic function was indeterminate.    Estimated right ventricular systolic pressure from tricuspid  "regurgitation is normal (<35 mmHg). Calculated right ventricular systolic pressure from tricuspid regurgitation is 21 mmHg.      My interpretation of the TTE is below.     LVEF is hyperdynamic at 75%.  There is sigmoid shaped ventricular septum.  LVOT gradient up to 57 mmHg with Valsalva.  Abnormal PA AT consistent with pulmonary hypertension.        Lab Results   Component Value Date    GLUCOSE 126 (H) 03/16/2022    BUN 13 03/16/2022    CREATININE 0.88 03/16/2022    EGFRIFNONA 75 09/14/2021    BCR 14.8 03/16/2022    K 4.3 03/16/2022    CO2 23.7 03/16/2022    CALCIUM 9.4 03/16/2022    ALBUMIN 4.70 03/16/2022    AST 16 03/16/2022    ALT 14 03/16/2022     Lab Results   Component Value Date    WBC 19.54 (H) 03/16/2022    HGB 15.5 03/16/2022    HCT 44.1 03/16/2022    MCV 85.3 03/16/2022     03/16/2022     No results found for: \"CHOL\", \"CHLPL\", \"TRIG\", \"HDL\", \"LDL\", \"LDLDIRECT\"  Lab Results   Component Value Date    TSH 0.668 03/16/2022     Lab Results   Component Value Date    CKTOTAL 96 03/16/2022    TROPONINT <0.010 03/16/2022     No results found for: \"HGBA1C\"  No results found for: \"DDIMER\"  Lab Results   Component Value Date    ALT 14 03/16/2022     No results found for: \"HGBA1C\"  Lab Results   Component Value Date    CREATININE 0.88 03/16/2022     No results found for: \"IRON\", \"TIBC\", \"FERRITIN\"  Lab Results   Component Value Date    INR 1.03 03/16/2022    PROTIME 13.4 03/16/2022       PAH RISK ASSESSMENT:        Assessment & Plan      1. Pulmonary hypertension    2. HOCM (hypertrophic obstructive cardiomyopathy)    3. Palpitations      He presents today with likely WHO-group-2 pulmonary hypertension.  He had an abnormal PA AT.  This is likely from worsening LVOT gradients and MR from HOCM.  I recommended that we obtain PFTs and engage him in treatment of his HOCM.    He is NYHA stage C; functional class II.  Today, euvolemic and perfused.  Most recent 2D TTE shows HOCM with LVOT gradient greater than 50 " mmHg.  He is on verapamil and Toprol-XL.  He is certainly at a candidate for consideration of mavacamten.  However, his Aptiom is strongly contraindicated with mavacamten.  I discussed with him today that if he wanted to proceed with mavacamten therapy he would need to follow-up with his NSG to discuss if he can transition to another AED that would not be contraindicated with mavacamten.   If he is able to transition to an AED that is not contraindicated with mavacamten, we will also need to discontinue his verapamil and initiate mavacamten.    He had risk stratification early in  with an event monitor.  There was one episode of NSVT.     -ICD risk score was calculated today.  ICD is not indicated at this time.  - First-degree relatives were recommended to be screened  - I have recommended a 7 day ZIO to add risk stratification  -He is a candidate for mavacamten, but unable to initiate at this time because he is currently taking Aptiom, which is strongly contraindicated.  I have asked him to follow-up with his neurological team and asked them if he is a candidate to transition to another AED (medication must not be a moderate to strong IDV9R28 inhibitor, strong CY inhibitor, moderate to strong TEA8H35 inducer, or moderate to strong CY inducer).   -Should he be transitioned over to a medication that is able to be prescribed in conjunction with mavacamten, we will need to discontinue his verapamil at his next appointment; alternatively, we could consider disopyramide; another option, is that he would be a candidate for SRT.  I did discuss the options for this today.      ORDERS PLACED TODAY:  Orders Placed This Encounter   Procedures    Holter Monitor - 72 Hour Up To 15 Days          MEDS ORDERED TODAY:    New Medications Ordered This Visit   Medications    pantoprazole (Protonix) 40 MG EC tablet     Sig: Take 1 tablet by mouth Daily.        MEDS DISCONTINUED TODAY:    Medications Discontinued During This  Encounter   Medication Reason    bisacodyl (DULCOLAX) 5 MG EC tablet *Therapy completed    Eslicarbazepine Acetate (Aptiom) 400 MG tablet *Re-Entry    omeprazole (priLOSEC) 40 MG capsule               Return in about 3 months (around 1/24/2024).      James Yusuf M.D., Ph.D., [facility] Heart Failure and Pulmonary Hypertension Clinic  System Medical Director for HF and PAH

## 2023-10-24 NOTE — LETTER
October 24, 2023       No Recipients    Patient: Santhosh Pope   YOB: 1970   Date of Visit: 10/24/2023       Dear Billy Kim MD:    Thank you for referring Santhosh Pope to me for evaluation. Below are the relevant portions of my assessment and plan of care.    Encounter Diagnosis and Orders:  Diagnoses and all orders for this visit:    1. Pulmonary hypertension (Primary)    2. HOCM (hypertrophic obstructive cardiomyopathy)    3. Palpitations  -     Holter Monitor - 72 Hour Up To 15 Days    Other orders  -     pantoprazole (Protonix) 40 MG EC tablet; Take 1 tablet by mouth Daily.        If you have questions, please do not hesitate to call me. I look forward to following Santhosh along with you.         Sincerely,        James Yusuf MD PhD        CC:   No Recipients

## 2023-10-24 NOTE — PROGRESS NOTES
"[facility] Heart Failure Clinic      Patient Name: Santhosh Pope  :1970  Age: 53 y.o.  Sex: male  Referring Provider: Max Husain MD   Primary Cardiologist: Max Husain MD  Encounter Provider: Zahraa Garcia [facility]      Chief Complaint:   Chief Complaint   Patient presents with    pulmonary hypertension       HPI:  Patient presents to the [facility] with his wife for his initial visit. 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 He reports that in 2021 he suddenly began experiencing shortness of breath and fatigue which prompted him to get evaluated and was diagnosed with HOCM. He reports not having any chest pain, but still has shortness of breath and fatigue when doing housework/yardwork. Patient follows up with [facility] oncology for meningioma and seizures. He currently takes levetiracetam and Aptiom (eslicarbazepine) for seizures.       Current Regimen:  CV Meds  Verapamil  mg CR BID  Metoprolol succinate XL 50 mg daily  Atorvastatin 20 mg daily  Aspirin 81 mg daily      Medication Use:  Adherence: Good. Missed 0 doses in the last week. Adherence tools used include: pillbox. 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  Wt Readings from Last 1 Encounters:   10/24/23 89.5 kg (197 lb 6.4 oz)       Lab Review:  Renal Function: CrCl cannot be calculated (Patient's most recent lab result is older than the maximum 30 days allowed.).    Lab Results   Component Value Date    PROBNP 72.7 2021       Results for orders placed during the hospital encounter of 10/05/23    Adult Transthoracic Echo Complete w/ Color, Spectral and Contrast if necessary per protocol    Interpretation Summary    Left ventricular systolic function is hyperdynamic (EF > 70%). Calculated left ventricular EF = 75.5%    Left ventricular wall thickness is consistent with mild concentric hypertrophy. Sigmoid-shaped ventricular septum is present.  There is a resting gradient in the left ventricular outflow tract of 24 mmHg which goes up to 57 mmHg with Valsalva.    The following left ventricular wall segments are hyperkinetic: basal anterior, basal anterolateral, basal inferolateral, basal inferior, basal inferoseptal, basal anteroseptal, mid anterior, mid anterolateral, mid inferolateral, mid inferior, mid inferoseptal, mid anteroseptal, apical anterior, apical lateral, apical inferior, apical septal and apex.    Left ventricular diastolic function was indeterminate.    Estimated right ventricular systolic pressure from tricuspid regurgitation is normal (<35 mmHg). Calculated right ventricular systolic pressure from tricuspid regurgitation is 21 mmHg.        ASSESSMENT/PLAN OF CARE:    HOCM (10/5/23 EF 75.5%; VLVOT 57 mmHg)  Patient is a candidate for Camzyos (mavacamten); however there is a contraindicated drug interaction at this time. Camzyos is primarily metabolized by [id number] and to a lesser extent by CY and CYP2C9.  Aptiom - Camzyos: Aptiom is a moderate CY inducer. The use of moderate CY inducers with mavacamten is contraindicated - the combination may lead to a life-threatening interaction via decreased mavacamten efficacy.  Of  note, there is NO drug interaction between Keppra and Camzyos.  Prior to initiation of Camzyos, patient would need to have Aptiom switched to a different agent or discontinue Aptiom and optimize dose of Keppra.        Thank you for allowing me to participate in the care of your patient,         Zahraa Garcia [facility] Heart Failure Clinic  10/24/23  09:47 EDT

## 2023-11-14 ENCOUNTER — HOSPITAL ENCOUNTER (OUTPATIENT)
Dept: CARDIOLOGY | Facility: HOSPITAL | Age: 53
Discharge: HOME | End: 2023-11-14
Admitting: NURSE PRACTITIONER
Payer: COMMERCIAL

## 2023-11-14 VITALS
BODY MASS INDEX: 24.82 KG/M2 | HEIGHT: 75 IN | HEART RATE: 53 BPM | WEIGHT: 199.6 LBS | DIASTOLIC BLOOD PRESSURE: 96 MMHG | OXYGEN SATURATION: 97 % | SYSTOLIC BLOOD PRESSURE: 154 MMHG

## 2023-11-14 DIAGNOSIS — E78.00 HYPERCHOLESTEROLEMIA: ICD-10-CM

## 2023-11-14 DIAGNOSIS — I42.1 HOCM (HYPERTROPHIC OBSTRUCTIVE CARDIOMYOPATHY): Primary | ICD-10-CM

## 2023-11-14 DIAGNOSIS — I10 ESSENTIAL HYPERTENSION: ICD-10-CM

## 2023-11-14 DIAGNOSIS — I27.20 PULMONARY HYPERTENSION: ICD-10-CM

## 2023-11-14 PROCEDURE — 99214 OFFICE O/P EST MOD 30 MIN: CPT | Performed by: NURSE PRACTITIONER

## 2023-11-14 PROCEDURE — G0463 HOSPITAL OUTPT CLINIC VISIT: HCPCS

## 2023-11-14 RX ORDER — MAVACAMTEN 5 MG/1
5 CAPSULE, GELATIN COATED ORAL DAILY
Qty: 30 CAPSULE | Refills: 0 | Status: SHIPPED | OUTPATIENT
Start: 2023-11-14

## 2023-11-14 RX ORDER — CODEINE/BUTALBITAL/ASA/CAFFEIN 30-50-325
1 CAPSULE ORAL EVERY 4 HOURS PRN
COMMUNITY

## 2023-11-14 NOTE — PROGRESS NOTES
[facility]      Patient Name: Santhosh Pope  :1970  Age: 53 y.o.  Sex: male  Referring Provider: Max Husain MD   Primary Cardiologist: Max Husain MD  Encounter Provider: Zahraa Garcia [facility]      Chief Complaint:   Chief Complaint   Patient presents with    pulmonary hypertension       HPI:  Patient presents to the [facility] for their follow-up visit. 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 Today, he is being seen for initiation of Camzyos. Previously, he was taking Aptiom which had a strong contraindication with Camzyos. He has stopped taking the Aptiom as of 23 and is now okay to start Camzyos. He reports having some fatigue and shortness of breath when exerting himself on chores such as lawn mowing or assisting his wife who is currently in a mobile chair and will be having hip replacement on both hips soon.     Baseline Echo (10/5/23):   EF 75.5%; VLVOT 57 mmHg   Camzyos started at 5 mg daily       Current Regimen:  CV Meds  Verapamil  mg CR BID  Metoprolol succinate XL 50 mg daily  Atorvastatin 20 mg daily  Aspirin 81 mg daily      Medication Use:  Adherence: Good. Missed 0 doses in the last week. Adherence tools used include: pillbox. Barriers for adherence include: none  Past hx of medication use/intolerance:   Affordability: Commercial - [employer]      Social History:  Tobacco use: former smoker, quit in  (1 PPD x 19 years)  EtOH use: occasionally  Illicit drug use: no history of illicit drug use  Exercise: limited physical activity due to health/physical limitations- working around the house/outside causes some shortness of breath/fatigue      Immunization Status:  Pneumococcal: none  Influenza: 20  COVID-19: 21, 21 (Moderna)      OBJECTIVE:    /96 (BP Location: Right arm, Patient Position: Sitting)   Pulse 53   Ht 190  "cm (74.8\")   Wt 90.5 kg (199 lb 9.6 oz)   SpO2 97%   BMI 25.08 kg/m²     Body mass index is 25.08 kg/m².  Wt Readings from Last 1 Encounters:   11/14/23 90.5 kg (199 lb 9.6 oz)       Lab Review:  Renal Function: CrCl cannot be calculated (Patient's most recent lab result is older than the maximum 30 days allowed.).    Lab Results   Component Value Date    PROBNP 72.7 07/27/2021       Results for orders placed during the hospital encounter of 10/05/23    Adult Transthoracic Echo Complete w/ Color, Spectral and Contrast if necessary per protocol    Interpretation Summary    Left ventricular systolic function is hyperdynamic (EF > 70%). Calculated left ventricular EF = 75.5%    Left ventricular wall thickness is consistent with mild concentric hypertrophy. Sigmoid-shaped ventricular septum is present.  There is a resting gradient in the left ventricular outflow tract of 24 mmHg which goes up to 57 mmHg with Valsalva.    The following left ventricular wall segments are hyperkinetic: basal anterior, basal anterolateral, basal inferolateral, basal inferior, basal inferoseptal, basal anteroseptal, mid anterior, mid anterolateral, mid inferolateral, mid inferior, mid inferoseptal, mid anteroseptal, apical anterior, apical lateral, apical inferior, apical septal and apex.    Left ventricular diastolic function was indeterminate.    Estimated right ventricular systolic pressure from tricuspid regurgitation is normal (<35 mmHg). Calculated right ventricular systolic pressure from tricuspid regurgitation is 21 mmHg.        ASSESSMENT/PLAN OF CARE:    HOCM (10/5/23 EF 75.5%; VLVOT 57 mmHg)  Start Camzyos 5 mg daily. He is a candidate and is no longer taking Aptiom, which is strongly contraindicated for use with Camzyos. No other significant drug interactions at this time.   Patient counseled on indication, MOA, dosing, possible side effects, follow-up scheduling, and when to call the clinic for concerns. Patient instructed to " call the clinic prior to starting/stopping any medications.          Thank you for allowing me to participate in the care of your patient,         Zahraa Garcia [facility]  11/14/23  09:47 EDT

## 2023-11-14 NOTE — PROGRESS NOTES
[facility]      Patient Name: Santhosh Pope  :1970  Age: 53 y.o.  Sex: male  Referring Provider: Max Husain MD   Primary Cardiologist: Max Husain MD  Encounter Provider:  LISSETTE Corbett      Chief Complaint:   Chief Complaint   Patient presents with    pulmonary hypertension         History of Present Illness this 53-year-old male, new to this provider, comes today for further evaluation regarding his hypertrophic obstructive cardiomyopathy.  Current diagnoses to include hypertension, pulmonary hypertension, hyperlipidemia, depression and anxiety, and seasonal allergies.  Records have been reviewed by me.    He began following with local cardiology in 2021.  Echocardiogram performed in  revealed an LVEF of 45%, it was significant for left ventricular concentric remodeling and turbulent flow in the LVOT with no significant gradient noted at that time.  Holter monitor worn in 2021 revealed a PVC burden of 0.24% with no ventricular runs.  Beta-blockade was not tolerated so he was started on verapamil in early .  LVOT gradients improved per chart review after starting Coreg.  Echocardiogram in 2021 revealed resting LVOT gradient of 16 and with Valsalva 71 mmHg.    He had a very complicated surgery in which she had a tumor removed at [facility] in  which was complicated by heart failure exacerbation and then coding twice.    He was hospitalized at the [facility] in 2022.  He began following with the heart failure clinic in 2023.  Initiation of Camzyos was deferred at that time so that he could wean off of Aptiom.  He is now on an increased dose of Keppra.    He is currently complaining of fatigue and shortness of breath on exertion.  He is despite this in very good spirits.  His wife will soon be undergoing bilateral hip replacements back to back about 6 weeks apart and he is her  primary caregiver.    The following portions of the patient's history were reviewed and updated as appropriate: allergies, current medications, past family history, past medical history, past social history, past surgical history and problem list.    Current Outpatient Medications   Medication Sig Dispense Refill    Aspirin 81 MG capsule aspirin 81 mg capsule   Take 1 capsule every day by oral route.      atorvastatin (LIPITOR) 20 MG tablet Take 1 tablet by mouth Every Evening. 90 tablet 3    butalbital-aspirin-caffeine-codeine (FIORINAL WITH CODEINE) -32-30 MG capsule Take 1 capsule by mouth Every 4 (Four) Hours As Needed for Headache.      escitalopram (LEXAPRO) 10 MG tablet Take 1 tablet by mouth Daily.      levETIRAcetam (KEPPRA) 500 MG tablet Take 2 tablets by mouth 2 (Two) Times a Day.      metoprolol succinate XL (TOPROL-XL) 50 MG 24 hr tablet Take 1 tablet by mouth Daily. 90 tablet 3    multivitamin with minerals tablet tablet Take 1 tablet by mouth Daily.      pantoprazole (Protonix) 40 MG EC tablet Take 1 tablet by mouth Daily.      verapamil SR (CALAN-SR) 120 MG CR tablet TAKE 1 TABLET TWICE A  tablet 1     Current Facility-Administered Medications   Medication Dose Route Frequency Provider Last Rate Last Admin    nitroglycerin (NITROSTAT) SL tablet 0.4 mg  0.4 mg Sublingual Q5 Min PRN Max Husain MD        sodium chloride 0.9 % flush 10 mL  10 mL Intravenous PRN Max Husain MD           Past Medical History:   Diagnosis Date    Abnormal ECG     Allergic     Anxiety     Headache     Heart murmur     HOCM (hypertrophic obstructive cardiomyopathy) 9/14/2021    Hyperlipidemia     Hypertension        Past Surgical History:   Procedure Laterality Date    BRAIN SURGERY      When he was 16 y/o    COLONOSCOPY      ESOPHAGOSCOPY / EGD      x 2     EYE SURGERY      x2    LEG SURGERY      STOMACH SURGERY         Physical Exam  Vitals and nursing note reviewed.   Constitutional:        General: He is not in acute distress.     Appearance: He is well-developed and normal weight. He is not ill-appearing.   HENT:      Head: Normocephalic and atraumatic.   Eyes:      Conjunctiva/sclera: Conjunctivae normal.      Pupils: Pupils are equal, round, and reactive to light.   Neck:      Vascular: No JVD.   Cardiovascular:      Rate and Rhythm: Normal rate and regular rhythm.      Heart sounds: Normal heart sounds. No murmur heard.     No friction rub. No gallop.   Pulmonary:      Effort: Pulmonary effort is normal. No respiratory distress.      Breath sounds: Normal breath sounds.   Abdominal:      General: Bowel sounds are normal. There is no distension.      Palpations: Abdomen is soft.   Musculoskeletal:         General: No swelling or deformity.   Skin:     General: Skin is warm and dry.      Capillary Refill: Capillary refill takes less than 2 seconds.   Neurological:      Mental Status: He is alert and oriented to person, place, and time. Mental status is at baseline.   Psychiatric:         Mood and Affect: Mood normal.         Behavior: Behavior normal.          Review of Systems   Constitutional:  Positive for fatigue. Negative for unexpected weight change.   HENT:  Negative for congestion and nosebleeds.    Eyes:  Negative for photophobia and visual disturbance.   Respiratory:  Positive for shortness of breath. Negative for cough and chest tightness.    Cardiovascular:  Negative for chest pain, palpitations and leg swelling.   Gastrointestinal:  Negative for abdominal distention and blood in stool.   Endocrine: Negative for polyphagia and polyuria.   Genitourinary:  Negative for frequency and urgency.   Musculoskeletal:  Negative for joint swelling and myalgias.   Skin:  Negative for pallor and rash.   Neurological:  Negative for dizziness, syncope, weakness, light-headedness, numbness and headaches.   Hematological:  Does not bruise/bleed easily.   Psychiatric/Behavioral:  Negative for confusion  "and sleep disturbance.         OBJECTIVE:  /96 (BP Location: Right arm, Patient Position: Sitting)   Pulse 53   Ht 190 cm (74.8\")   Wt 90.5 kg (199 lb 9.6 oz)   SpO2 97%   BMI 25.08 kg/m²      Body mass index is 25.08 kg/m².  Wt Readings from Last 1 Encounters:   11/14/23 90.5 kg (199 lb 9.6 oz)       Lab Review:  Renal Function: CrCl cannot be calculated (Patient's most recent lab result is older than the maximum 30 days allowed.).    Lab Results   Component Value Date    PROBNP 72.7 07/27/2021       Results for orders placed during the hospital encounter of 10/05/23    Adult Transthoracic Echo Complete w/ Color, Spectral and Contrast if necessary per protocol    Interpretation Summary    Left ventricular systolic function is hyperdynamic (EF > 70%). Calculated left ventricular EF = 75.5%    Left ventricular wall thickness is consistent with mild concentric hypertrophy. Sigmoid-shaped ventricular septum is present.  There is a resting gradient in the left ventricular outflow tract of 24 mmHg which goes up to 57 mmHg with Valsalva.    The following left ventricular wall segments are hyperkinetic: basal anterior, basal anterolateral, basal inferolateral, basal inferior, basal inferoseptal, basal anteroseptal, mid anterior, mid anterolateral, mid inferolateral, mid inferior, mid inferoseptal, mid anteroseptal, apical anterior, apical lateral, apical inferior, apical septal and apex.    Left ventricular diastolic function was indeterminate.    Estimated right ventricular systolic pressure from tricuspid regurgitation is normal (<35 mmHg). Calculated right ventricular systolic pressure from tricuspid regurgitation is 21 mmHg.      Procedures      6 MINUTE WALK                      Cardiac Procedures:  1. N/A       Previously trialed diuretics  *      Previously trialed GDMT    Metoprolol succinate  Carvedilol      ASSESSMENT:     Diagnosis Plan   1. HOCM (hypertrophic obstructive cardiomyopathy)  CBC & " Differential    Comprehensive Metabolic Panel    BNP    Lipid Panel    Hemoglobin A1c      2. Hypercholesterolemia  Lipid Panel      3. Essential hypertension        4. Pulmonary hypertension              PLAN OF CARE:  1. HOCM-he presents today for initiation of Camzyos.  He has been weaned off of Aptiom and his Keppra has been increased.  He has not had any seizure activity since then.  This was discussed extensively with our pharmacist.  We will now initiate Camzyos at 5 mg, repeat an echo in 4 weeks.  We extensively reviewed this with him in office.  All questions and concerns were addressed.  Risks of cardiomyopathy were discussed.  He is eager to feel better and eager to start the medication.  Labs today as below.    2.  Hypertension-currently stable and controlled.    3.  Pulmonary hypertension-managed by Dr. Yusuf.    4.  Hyperlipidemia-we will check a lipid panel today.      CBC/CMP/BNP/lipid/hemoglobin A1c; start Camzyos 5 mg; repeat echocardiogram in 4 weeks; follow-up in 4 weeks or sooner if needed         Advance Care Planning   Will address at subsequent visit      Thank you for allowing me to participate in the care of your patient,         LISSETTE Corbett  [facility]  11/14/23  15:39 EST      **Estefany Disclaimer:**  Much of this encounter note is an electronic transcription/translation of spoken language to printed text. The electronic translation of spoken language may permit erroneous, or at times, nonsensical words or phrases to be inadvertently transcribed. Although I have reviewed the note for such errors, some may still exist.

## 2023-11-14 NOTE — LETTER
2023       No Recipients    Patient: Santhosh Pope   YOB: 1970   Date of Visit: 2023     Dear Max Husain MD:       Thank you for referring Santhosh Pope to me for evaluation. Below are the relevant portions of my assessment and plan of care.    If you have questions, please do not hesitate to call me. I look forward to following Santhosh along with you.         Sincerely,        LISSETTE Corbett        CC:   No Recipients    Karon Moseley APRN  23 [id number]  [facility]ART FAILURE      Patient Name: Santhosh Pope  :1970  Age: 53 y.o.  Sex: male  Referring Provider: Max Husain MD   Primary Cardiologist: Max Husain MD  Encounter Provider:  LISSETTE Corbett      Chief Complaint:   Chief Complaint   Patient presents with   • pulmonary hypertension         History of Present Illness this 53-year-old male, new to this provider, comes today for further evaluation regarding his hypertrophic obstructive cardiomyopathy.  Current diagnoses to include hypertension, pulmonary hypertension, hyperlipidemia, depression and anxiety, and seasonal allergies.  Records have been reviewed by me.    He began following with local cardiology in 2021.  Echocardiogram performed in  revealed an LVEF of 45%, it was significant for left ventricular concentric remodeling and turbulent flow in the LVOT with no significant gradient noted at that time.  Holter monitor worn in 2021 revealed a PVC burden of 0.24% with no ventricular runs.  Beta-blockade was not tolerated so he was started on verapamil in early .  LVOT gradients improved per chart review after starting Coreg.  Echocardiogram in 2021 revealed resting LVOT gradient of 16 and with Valsalva 71 mmHg.    He had a very complicated surgery in which she had a tumor removed at [facility] in  which was complicated by heart failure  exacerbation and then coding twice.    He was hospitalized at the [facility] in November 2022.  He began following with the heart failure clinic in September 2023.  Initiation of Camzyos was deferred at that time so that he could wean off of Aptiom.  He is now on an increased dose of Keppra.    He is currently complaining of fatigue and shortness of breath on exertion.  He is despite this in very good spirits.  His wife will soon be undergoing bilateral hip replacements back to back about 6 weeks apart and he is her primary caregiver.    The following portions of the patient's history were reviewed and updated as appropriate: allergies, current medications, past family history, past medical history, past social history, past surgical history and problem list.    Current Outpatient Medications   Medication Sig Dispense Refill   • Aspirin 81 MG capsule aspirin 81 mg capsule   Take 1 capsule every day by oral route.     • atorvastatin (LIPITOR) 20 MG tablet Take 1 tablet by mouth Every Evening. 90 tablet 3   • butalbital-aspirin-caffeine-codeine (FIORINAL WITH CODEINE) -49-30 MG capsule Take 1 capsule by mouth Every 4 (Four) Hours As Needed for Headache.     • escitalopram (LEXAPRO) 10 MG tablet Take 1 tablet by mouth Daily.     • levETIRAcetam (KEPPRA) 500 MG tablet Take 2 tablets by mouth 2 (Two) Times a Day.     • metoprolol succinate XL (TOPROL-XL) 50 MG 24 hr tablet Take 1 tablet by mouth Daily. 90 tablet 3   • multivitamin with minerals tablet tablet Take 1 tablet by mouth Daily.     • pantoprazole (Protonix) 40 MG EC tablet Take 1 tablet by mouth Daily.     • verapamil SR (CALAN-SR) 120 MG CR tablet TAKE 1 TABLET TWICE A  tablet 1     Current Facility-Administered Medications   Medication Dose Route Frequency Provider Last Rate Last Admin   • nitroglycerin (NITROSTAT) SL tablet 0.4 mg  0.4 mg Sublingual Q5 Min PRN Max Husain MD       • sodium chloride 0.9 % flush 10 mL  10  mL Intravenous Max Joy MD           Past Medical History:   Diagnosis Date   • Abnormal ECG    • Allergic    • Anxiety    • Headache    • Heart murmur    • HOCM (hypertrophic obstructive cardiomyopathy) 9/14/2021   • Hyperlipidemia    • Hypertension        Past Surgical History:   Procedure Laterality Date   • BRAIN SURGERY      When he was 16 y/o   • COLONOSCOPY     • ESOPHAGOSCOPY / EGD      x 2    • EYE SURGERY      x2   • LEG SURGERY     • STOMACH SURGERY         Physical Exam  Vitals and nursing note reviewed.   Constitutional:       General: He is not in acute distress.     Appearance: He is well-developed and normal weight. He is not ill-appearing.   HENT:      Head: Normocephalic and atraumatic.   Eyes:      Conjunctiva/sclera: Conjunctivae normal.      Pupils: Pupils are equal, round, and reactive to light.   Neck:      Vascular: No JVD.   Cardiovascular:      Rate and Rhythm: Normal rate and regular rhythm.      Heart sounds: Normal heart sounds. No murmur heard.     No friction rub. No gallop.   Pulmonary:      Effort: Pulmonary effort is normal. No respiratory distress.      Breath sounds: Normal breath sounds.   Abdominal:      General: Bowel sounds are normal. There is no distension.      Palpations: Abdomen is soft.   Musculoskeletal:         General: No swelling or deformity.   Skin:     General: Skin is warm and dry.      Capillary Refill: Capillary refill takes less than 2 seconds.   Neurological:      Mental Status: He is alert and oriented to person, place, and time. Mental status is at baseline.   Psychiatric:         Mood and Affect: Mood normal.         Behavior: Behavior normal.          Review of Systems   Constitutional:  Positive for fatigue. Negative for unexpected weight change.   HENT:  Negative for congestion and nosebleeds.    Eyes:  Negative for photophobia and visual disturbance.   Respiratory:  Positive for shortness of breath. Negative for cough and chest  "tightness.    Cardiovascular:  Negative for chest pain, palpitations and leg swelling.   Gastrointestinal:  Negative for abdominal distention and blood in stool.   Endocrine: Negative for polyphagia and polyuria.   Genitourinary:  Negative for frequency and urgency.   Musculoskeletal:  Negative for joint swelling and myalgias.   Skin:  Negative for pallor and rash.   Neurological:  Negative for dizziness, syncope, weakness, light-headedness, numbness and headaches.   Hematological:  Does not bruise/bleed easily.   Psychiatric/Behavioral:  Negative for confusion and sleep disturbance.         OBJECTIVE:  /96 (BP Location: Right arm, Patient Position: Sitting)   Pulse 53   Ht 190 cm (74.8\")   Wt 90.5 kg (199 lb 9.6 oz)   SpO2 97%   BMI 25.08 kg/m²      Body mass index is 25.08 kg/m².  Wt Readings from Last 1 Encounters:   11/14/23 90.5 kg (199 lb 9.6 oz)       Lab Review:  Renal Function: CrCl cannot be calculated (Patient's most recent lab result is older than the maximum 30 days allowed.).    Lab Results   Component Value Date    PROBNP 72.7 07/27/2021       Results for orders placed during the hospital encounter of 10/05/23    Adult Transthoracic Echo Complete w/ Color, Spectral and Contrast if necessary per protocol    Interpretation Summary  •  Left ventricular systolic function is hyperdynamic (EF > 70%). Calculated left ventricular EF = 75.5%  •  Left ventricular wall thickness is consistent with mild concentric hypertrophy. Sigmoid-shaped ventricular septum is present.  There is a resting gradient in the left ventricular outflow tract of 24 mmHg which goes up to 57 mmHg with Valsalva.  •  The following left ventricular wall segments are hyperkinetic: basal anterior, basal anterolateral, basal inferolateral, basal inferior, basal inferoseptal, basal anteroseptal, mid anterior, mid anterolateral, mid inferolateral, mid inferior, mid inferoseptal, mid anteroseptal, apical anterior, apical lateral, " apical inferior, apical septal and apex.  •  Left ventricular diastolic function was indeterminate.  •  Estimated right ventricular systolic pressure from tricuspid regurgitation is normal (<35 mmHg). Calculated right ventricular systolic pressure from tricuspid regurgitation is 21 mmHg.      Procedures      6 MINUTE WALK                      Cardiac Procedures:  1. N/A       Previously trialed diuretics  *      Previously trialed GDMT    Metoprolol succinate  Carvedilol      ASSESSMENT:     Diagnosis Plan   1. HOCM (hypertrophic obstructive cardiomyopathy)  CBC & Differential    Comprehensive Metabolic Panel    BNP    Lipid Panel    Hemoglobin A1c      2. Hypercholesterolemia  Lipid Panel      3. Essential hypertension        4. Pulmonary hypertension              PLAN OF CARE:  1. HOCM-he presents today for initiation of Camzyos.  He has been weaned off of Aptiom and his Keppra has been increased.  He has not had any seizure activity since then.  This was discussed extensively with our pharmacist.  We will now initiate Camzyos at 5 mg, repeat an echo in 4 weeks.  We extensively reviewed this with him in office.  All questions and concerns were addressed.  Risks of cardiomyopathy were discussed.  He is eager to feel better and eager to start the medication.  Labs today as below.    2.  Hypertension-currently stable and controlled.    3.  Pulmonary hypertension-managed by Dr. Yusuf.    4.  Hyperlipidemia-we will check a lipid panel today.      CBC/CMP/BNP/lipid/hemoglobin A1c; start Camzyos 5 mg; repeat echocardiogram in 4 weeks; follow-up in 4 weeks or sooner if needed         Advance Care Planning  Will address at subsequent visit      Thank you for allowing me to participate in the care of your patient,         LISSETTE Corbett  [facility] HEART FAILURE  11/14/23  15:39 EST      **Estefany Disclaimer:**  Much of this encounter note is an electronic transcription/translation of spoken language to printed  text. The electronic translation of spoken language may permit erroneous, or at times, nonsensical words or phrases to be inadvertently transcribed. Although I have reviewed the note for such errors, some may still exist.

## 2023-11-16 ENCOUNTER — TELEPHONE (OUTPATIENT)
Dept: CARDIOLOGY | Facility: HOSPITAL | Age: 53
End: 2023-11-16
Payer: COMMERCIAL

## 2023-11-16 NOTE — TELEPHONE ENCOUNTER
Discussed with Dr. Yusuf. Given patient's HTN, he is to stay on both. Patient has been on a stable dose of diltiazem. Called and spoke to pharmacy and let them know. They will reach back out to the patient to send his Camzyos.    Anca Holguin, PharmD, BCACP  [facility]  Phone: [phone]      ----- Message from Lashonda Dela Cruz sent at 11/16/2023  1:56 PM EST -----  Regarding: Drug Interaction  Received a call from [employer] by [employer]. She stated that there is a drug interaction with the Camzyos and Verapamil & Metroprolol. She states Camzyos with increase toxicity with Verapamil & Metoprolol.    Call Back number is [phone] Option 3     Thank you,  Alicia HOLT [facility]

## 2023-11-17 ENCOUNTER — TELEPHONE (OUTPATIENT)
Dept: CARDIOLOGY | Facility: HOSPITAL | Age: 53
End: 2023-11-17
Payer: COMMERCIAL

## 2023-11-17 NOTE — TELEPHONE ENCOUNTER
Patient's spouse called today. She reports patient received his Camzyos shipment today. He has taken his first dose.    Thank you,  Alicia MORROW

## 2023-12-13 ENCOUNTER — HOSPITAL ENCOUNTER (OUTPATIENT)
Dept: CARDIOLOGY | Facility: HOSPITAL | Age: 53
Discharge: HOME | End: 2023-12-13
Admitting: NURSE PRACTITIONER
Payer: COMMERCIAL

## 2023-12-13 VITALS — HEART RATE: 77 BPM | BODY MASS INDEX: 24.49 KG/M2 | HEIGHT: 75 IN | WEIGHT: 197 LBS

## 2023-12-13 DIAGNOSIS — I42.1 HOCM (HYPERTROPHIC OBSTRUCTIVE CARDIOMYOPATHY): ICD-10-CM

## 2023-12-13 LAB
AORTIC ARCH: 2.8 CM
ASCENDING AORTA: 2.9 CM
BH CV ECHO LEFT VENTRICLE GLOBAL LONGITUDINAL STRAIN: -19 %
BH CV ECHO MEAS - ACS: 2.42 CM
BH CV ECHO MEAS - AO MAX PG: 6.5 MMHG
BH CV ECHO MEAS - AO MEAN PG: 3.6 MMHG
BH CV ECHO MEAS - AO ROOT DIAM: 3.2 CM
BH CV ECHO MEAS - AO V2 MAX: 127.6 CM/SEC
BH CV ECHO MEAS - AO V2 VTI: 29.6 CM
BH CV ECHO MEAS - AVA(I,D): 5.3 CM2
BH CV ECHO MEAS - EDV(CUBED): 99.1 ML
BH CV ECHO MEAS - EDV(MOD-SP2): 146 ML
BH CV ECHO MEAS - EDV(MOD-SP4): 142 ML
BH CV ECHO MEAS - EF(MOD-BP): 61.8 %
BH CV ECHO MEAS - EF(MOD-SP2): 61.6 %
BH CV ECHO MEAS - EF(MOD-SP4): 61.3 %
BH CV ECHO MEAS - ESV(CUBED): 26.7 ML
BH CV ECHO MEAS - ESV(MOD-SP2): 56 ML
BH CV ECHO MEAS - ESV(MOD-SP4): 55 ML
BH CV ECHO MEAS - FS: 35.4 %
BH CV ECHO MEAS - IVS/LVPW: 1.3 CM
BH CV ECHO MEAS - IVSD: 1.36 CM
BH CV ECHO MEAS - LAT PEAK E' VEL: 8.2 CM/SEC
BH CV ECHO MEAS - LV MASS(C)D: 208 GRAMS
BH CV ECHO MEAS - LV MAX PG: 7.5 MMHG
BH CV ECHO MEAS - LV MEAN PG: 4.6 MMHG
BH CV ECHO MEAS - LV V1 MAX: 136.9 CM/SEC
BH CV ECHO MEAS - LV V1 VTI: 30.5 CM
BH CV ECHO MEAS - LVIDD: 4.6 CM
BH CV ECHO MEAS - LVIDS: 3 CM
BH CV ECHO MEAS - LVOT AREA: 5.1 CM2
BH CV ECHO MEAS - LVOT DIAM: 2.6 CM
BH CV ECHO MEAS - LVPWD: 1.05 CM
BH CV ECHO MEAS - MED PEAK E' VEL: 6.9 CM/SEC
BH CV ECHO MEAS - MV A DUR: 0.16 SEC
BH CV ECHO MEAS - MV A MAX VEL: 66.7 CM/SEC
BH CV ECHO MEAS - MV DEC SLOPE: 535.1 CM/SEC2
BH CV ECHO MEAS - MV DEC TIME: 0.15 SEC
BH CV ECHO MEAS - MV E MAX VEL: 94.6 CM/SEC
BH CV ECHO MEAS - MV E/A: 1.42
BH CV ECHO MEAS - MV MAX PG: 5.3 MMHG
BH CV ECHO MEAS - MV MEAN PG: 1.83 MMHG
BH CV ECHO MEAS - MV P1/2T: 65.4 MSEC
BH CV ECHO MEAS - MV V2 VTI: 31.5 CM
BH CV ECHO MEAS - MVA(P1/2T): 3.4 CM2
BH CV ECHO MEAS - MVA(VTI): 5 CM2
BH CV ECHO MEAS - PA ACC TIME: 0.12 SEC
BH CV ECHO MEAS - PA V2 MAX: 89 CM/SEC
BH CV ECHO MEAS - PULM A REVS DUR: 0.14 SEC
BH CV ECHO MEAS - PULM A REVS VEL: 37.6 CM/SEC
BH CV ECHO MEAS - PULM DIAS VEL: 38.1 CM/SEC
BH CV ECHO MEAS - PULM S/D: 1.11
BH CV ECHO MEAS - PULM SYS VEL: 42.2 CM/SEC
BH CV ECHO MEAS - QP/QS: 0.36
BH CV ECHO MEAS - RV MAX PG: 1.4 MMHG
BH CV ECHO MEAS - RV V1 MAX: 59.1 CM/SEC
BH CV ECHO MEAS - RV V1 VTI: 13.7 CM
BH CV ECHO MEAS - RVOT DIAM: 2.29 CM
BH CV ECHO MEAS - SV(LVOT): 156.3 ML
BH CV ECHO MEAS - SV(MOD-SP2): 90 ML
BH CV ECHO MEAS - SV(MOD-SP4): 87 ML
BH CV ECHO MEAS - SV(RVOT): 56.3 ML
BH CV ECHO MEAS - TAPSE (>1.6): 2.28 CM
BH CV ECHO MEASUREMENTS AVERAGE E/E' RATIO: 12.53
BH CV XLRA - RV BASE: 3.1 CM
BH CV XLRA - RV LENGTH: 7 CM
BH CV XLRA - RV MID: 2.44 CM
BH CV XLRA - TDI S': 9.7 CM/SEC
LEFT ATRIUM VOLUME INDEX: 27 ML/M2
SINUS: 3.2 CM
STJ: 2.8 CM

## 2023-12-13 PROCEDURE — 93356 MYOCRD STRAIN IMG SPCKL TRCK: CPT

## 2023-12-13 PROCEDURE — 93306 TTE W/DOPPLER COMPLETE: CPT

## 2023-12-13 PROCEDURE — 25510000001 PERFLUTREN (DEFINITY) 8.476 MG IN SODIUM CHLORIDE (PF) 0.9 % 10 ML INJECTION: Performed by: NURSE PRACTITIONER

## 2023-12-13 RX ADMIN — PERFLUTREN 1.5 ML: 6.52 INJECTION, SUSPENSION INTRAVENOUS at 15:41

## 2023-12-14 ENCOUNTER — TELEPHONE (OUTPATIENT)
Dept: CARDIOLOGY | Facility: HOSPITAL | Age: 53
End: 2023-12-14
Payer: COMMERCIAL

## 2023-12-14 DIAGNOSIS — I42.1 HOCM (HYPERTROPHIC OBSTRUCTIVE CARDIOMYOPATHY): Primary | ICD-10-CM

## 2023-12-14 RX ORDER — MAVACAMTEN 2.5 MG/1
2.5 CAPSULE, GELATIN COATED ORAL DAILY
Qty: 30 CAPSULE | Refills: 0 | Status: SHIPPED | OUTPATIENT
Start: 2023-12-14 | End: 2023-12-15 | Stop reason: SDUPTHER

## 2023-12-14 NOTE — TELEPHONE ENCOUNTER
Called and spoke to patient's wife regarding his 4-wk echo results following Camzyos initiation. EF 61-65%, VLVOT 10 mmHg. Given VLVOT dropped below 20 mmHg, decrease dose to 2.5 mg daily. Repeat echo in 4-weeks. Patient's wife does state he has been feeling better. No new medications started. Script sent and REMS completed.      Christi - can you please coordinate getting him an echo scheduled in 4-weeks? F/u appt here can be scheduled same day.      Anca Holguin, PharmD, BCACP  [facility]  Phone: [phone]

## 2023-12-15 RX ORDER — MAVACAMTEN 2.5 MG/1
2.5 CAPSULE, GELATIN COATED ORAL DAILY
Qty: 30 CAPSULE | Refills: 0 | Status: SHIPPED | OUTPATIENT
Start: 2023-12-15

## 2024-01-09 ENCOUNTER — TELEPHONE (OUTPATIENT)
Dept: CARDIOLOGY | Facility: CLINIC | Age: 54
End: 2024-01-09
Payer: COMMERCIAL

## 2024-01-10 ENCOUNTER — HOSPITAL ENCOUNTER (OUTPATIENT)
Dept: CARDIOLOGY | Facility: HOSPITAL | Age: 54
Discharge: HOME | End: 2024-01-10
Payer: COMMERCIAL

## 2024-01-10 VITALS
DIASTOLIC BLOOD PRESSURE: 82 MMHG | WEIGHT: 197 LBS | HEART RATE: 65 BPM | SYSTOLIC BLOOD PRESSURE: 126 MMHG | OXYGEN SATURATION: 99 % | HEIGHT: 75 IN | BODY MASS INDEX: 24.49 KG/M2

## 2024-01-10 VITALS
HEIGHT: 75 IN | BODY MASS INDEX: 25.64 KG/M2 | SYSTOLIC BLOOD PRESSURE: 126 MMHG | OXYGEN SATURATION: 96 % | DIASTOLIC BLOOD PRESSURE: 88 MMHG | HEART RATE: 60 BPM | WEIGHT: 206.2 LBS

## 2024-01-10 DIAGNOSIS — I42.1 HOCM (HYPERTROPHIC OBSTRUCTIVE CARDIOMYOPATHY): ICD-10-CM

## 2024-01-10 DIAGNOSIS — I27.20 PULMONARY HYPERTENSION: Primary | ICD-10-CM

## 2024-01-10 LAB
AV HCM GRAD VALS: 5 MMHG
AV LVOT PEAK GRADIENT: 7 MMHG
BH CV ECHO LEFT VENTRICLE GLOBAL LONGITUDINAL STRAIN: -22.2 %
BH CV ECHO LEFT VENTRICLE MID CAVITARY GRADIENT: 5 MMHG
BH CV ECHO MEAS - AO MAX PG: 7.7 MMHG
BH CV ECHO MEAS - AO MEAN PG: 4 MMHG
BH CV ECHO MEAS - AO V2 MAX: 138.5 CM/SEC
BH CV ECHO MEAS - AO V2 VTI: 31.1 CM
BH CV ECHO MEAS - AVA(I,D): 4.5 CM2
BH CV ECHO MEAS - EDV(CUBED): 47.5 ML
BH CV ECHO MEAS - EDV(MOD-SP2): 73 ML
BH CV ECHO MEAS - EDV(MOD-SP4): 72 ML
BH CV ECHO MEAS - EF(MOD-BP): 67.9 %
BH CV ECHO MEAS - EF(MOD-SP2): 68.5 %
BH CV ECHO MEAS - EF(MOD-SP4): 63.9 %
BH CV ECHO MEAS - ESV(CUBED): 11.6 ML
BH CV ECHO MEAS - ESV(MOD-SP2): 23 ML
BH CV ECHO MEAS - ESV(MOD-SP4): 26 ML
BH CV ECHO MEAS - FS: 37.4 %
BH CV ECHO MEAS - IVS/LVPW: 1.11 CM
BH CV ECHO MEAS - IVSD: 1.2 CM
BH CV ECHO MEAS - LAT PEAK E' VEL: 7.7 CM/SEC
BH CV ECHO MEAS - LV DIASTOLIC VOL/BSA (35-75): 33 CM2
BH CV ECHO MEAS - LV MASS(C)D: 131.7 GRAMS
BH CV ECHO MEAS - LV MAX PG: 6.8 MMHG
BH CV ECHO MEAS - LV MEAN PG: 4 MMHG
BH CV ECHO MEAS - LV SYSTOLIC VOL/BSA (12-30): 11.9 CM2
BH CV ECHO MEAS - LV V1 MAX: 130 CM/SEC
BH CV ECHO MEAS - LV V1 VTI: 26.3 CM
BH CV ECHO MEAS - LVIDD: 3.6 CM
BH CV ECHO MEAS - LVIDS: 2.27 CM
BH CV ECHO MEAS - LVOT AREA: 5.4 CM2
BH CV ECHO MEAS - LVOT DIAM: 2.6 CM
BH CV ECHO MEAS - LVPWD: 1.08 CM
BH CV ECHO MEAS - MED PEAK E' VEL: 7 CM/SEC
BH CV ECHO MEAS - MV A MAX VEL: 80.2 CM/SEC
BH CV ECHO MEAS - MV DEC SLOPE: 429.6 CM/SEC2
BH CV ECHO MEAS - MV DEC TIME: 0.16 SEC
BH CV ECHO MEAS - MV E MAX VEL: 84.5 CM/SEC
BH CV ECHO MEAS - MV E/A: 1.05
BH CV ECHO MEAS - MV MAX PG: 3.2 MMHG
BH CV ECHO MEAS - MV MEAN PG: 1.58 MMHG
BH CV ECHO MEAS - MV P1/2T: 54.6 MSEC
BH CV ECHO MEAS - MV V2 VTI: 25.5 CM
BH CV ECHO MEAS - MVA(P1/2T): 4 CM2
BH CV ECHO MEAS - MVA(VTI): 5.5 CM2
BH CV ECHO MEAS - PULM A REVS DUR: 0.09 SEC
BH CV ECHO MEAS - PULM A REVS VEL: 27.6 CM/SEC
BH CV ECHO MEAS - PULM DIAS VEL: 32.7 CM/SEC
BH CV ECHO MEAS - PULM S/D: 1.09
BH CV ECHO MEAS - PULM SYS VEL: 35.5 CM/SEC
BH CV ECHO MEAS - RAP SYSTOLE: 3 MMHG
BH CV ECHO MEAS - RVSP: 12.9 MMHG
BH CV ECHO MEAS - SI(MOD-SP2): 22.9 ML/M2
BH CV ECHO MEAS - SI(MOD-SP4): 21.1 ML/M2
BH CV ECHO MEAS - SV(LVOT): 141.5 ML
BH CV ECHO MEAS - SV(MOD-SP2): 50 ML
BH CV ECHO MEAS - SV(MOD-SP4): 46 ML
BH CV ECHO MEAS - TAPSE (>1.6): 1.98 CM
BH CV ECHO MEAS - TR MAX PG: 9.9 MMHG
BH CV ECHO MEAS - TR MAX VEL: 157.5 CM/SEC
BH CV ECHO MEASUREMENTS AVERAGE E/E' RATIO: 11.5
BH CV VAS BP LEFT ARM: NORMAL MMHG
BH CV XLRA - TDI S': 10.7 CM/SEC
LEFT ATRIUM VOLUME INDEX: 34.4 ML/M2

## 2024-01-10 PROCEDURE — 93356 MYOCRD STRAIN IMG SPCKL TRCK: CPT

## 2024-01-10 PROCEDURE — 93325 DOPPLER ECHO COLOR FLOW MAPG: CPT

## 2024-01-10 PROCEDURE — 93321 DOPPLER ECHO F-UP/LMTD STD: CPT

## 2024-01-10 PROCEDURE — 93308 TTE F-UP OR LMTD: CPT

## 2024-01-10 PROCEDURE — G0463 HOSPITAL OUTPT CLINIC VISIT: HCPCS

## 2024-01-10 PROCEDURE — 25510000001 PERFLUTREN (DEFINITY) 8.476 MG IN SODIUM CHLORIDE (PF) 0.9 % 10 ML INJECTION: Performed by: INTERNAL MEDICINE

## 2024-01-10 RX ORDER — MAVACAMTEN 2.5 MG/1
2.5 CAPSULE, GELATIN COATED ORAL DAILY
Qty: 30 CAPSULE | Refills: 0 | Status: SHIPPED | OUTPATIENT
Start: 2024-01-10 | End: 2024-01-11

## 2024-01-10 RX ADMIN — PERFLUTREN 1.5 ML: 6.52 INJECTION, SUSPENSION INTRAVENOUS at 12:13

## 2024-01-10 NOTE — ADDENDUM NOTE
Encounter addended by: Prabha Foy MA on: 1/10/2024 3:47 PM   Actions taken: Charge Capture section accepted

## 2024-01-10 NOTE — PROGRESS NOTES
"[facility]      Patient Name: Santhosh Pope  :1970  Age: 53 y.o.  Sex: male  Referring Provider: James Yusuf,*   Primary Cardiologist: Max Husain MD  Encounter Provider: Anca Holguin, GeoffD      Chief Complaint:   Chief Complaint   Patient presents with    pulmonary hypertension       HPI:  Patient presents to the [facility] for their follow-up visit. 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 He comes today for echo results for Camzyos. This is his 8-wk f/u from initiation. He felt much better on the 5 mg with increasing energy and less shortness of breath. He has not felt as well on the 2.5 mg dose.    Baseline Echo (10/5/23):   EF 75.5%; VLVOT 57 mmHg   Camzyos started at 5 mg daily     4-wk f/u echo (23):  EF 61-65%; VLVOT 10 mmHg  Camzyos decreased to 2.5 mg daily    8-wk f/u echo (4/10/24):  EF 67.9%; VLVOT 5 mmHg      Current Regimen:  CV Meds  Verapamil  mg CR BID  Metoprolol succinate XL 50 mg daily  Atorvastatin 20 mg daily  Aspirin 81 mg daily      Medication Use:  Adherence: Good. Missed 0 doses in the last week. Adherence tools used include: pillbox. 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  Wt Readings from Last 1 Encounters:   01/10/24 93.5 kg (206 lb 3.2 oz)       Lab Review:  Renal Function: CrCl cannot be calculated (Patient's most recent lab result is older than the maximum 30 days allowed.).    Lab Results   Component Value Date    PROBNP 72.7 07/27/2021       Results for orders placed during the hospital encounter of 01/10/24    Adult Transthoracic Echo Limited W/ Cont if Necessary Per Protocol    Interpretation Summary    Limited echocardiogram for mavacamten    Left ventricular systolic function is normal. Calculated left ventricular EF = 67.9%.  Diastolic function is normal.    Moderate asymmetric septal hypertrophy.  LVOT gradient 5 mmHg.  The findings are consistent with hypertrophic cardiomyopathy.    Normal right ventricular cavity size, wall thickness, systolic function and septal motion noted.        ASSESSMENT/PLAN OF CARE:    HOCM   Here for 8-wk echo results after starting Camzyos. EF 67.9%; VLVOT 5 mmHg. He felt better on the 5 mg dose and has not had as much improvement on the 2.5 mg dose  Continue Camzyos 2.5 mg daily. REMS completed. F/u echo in 4 weeks. He has not started any new medications    Continue metoprolol succinate 50 mg daily  Continue verapamil 120 mg twice daily         Thank you for allowing me to participate in the care of your patient,         Anca Holguin, PharmD  [facility]  01/10/24  09:47 EDT     Home

## 2024-01-10 NOTE — LETTER
January 10, 2024       No Recipients    Patient: Santhosh Pope   YOB: 1970   Date of Visit: 1/10/2024     Dear James Yusuf MD PhD:       Thank you for referring Santhosh Pope to me for evaluation. Below are the relevant portions of my assessment and plan of care.    If you have questions, please do not hesitate to call me. I look forward to following Santhosh along with you.         Sincerely,        James Yusuf MD PhD        CC:   No Recipients    James Yusuf MD PhD  01/10/24 1312  Signed  Heart Failure & Pulmonary Arterial Hypertension Clinic  [facility]  James Yusuf M.D., Ph.D., [facility]       James Yusuf MD PhD  [address] [place] [address]    Thank you for asking me to see Santhosh Pope.     History of Present Illness  This is a 53 y.o. male with:    1. PH by abnormal PA AT  2. HOCM      Santhosh Pope is a 53 y.o. male who presents today.  The patient is typically seen by Billy Kim MD.  The patient's cardiologist is Dr. Husain.     The patient returns to clinic today for HOCM.  He was started on mavacamten at 5 mg.  He was taken down to 2.5mg since his LVOT <20mmHg. This is his second month of being on the medication.  He had a 2D TTE prior to his appointment today.  He presents for results.  Stable exercise intolerance.  He is able to obtain and afford his mavacamten.  Otherwise, he is on verapamil and Toprol-XL.  Stable exercise intolerance.      Review of Systems - Review of Systems   Cardiovascular:  Positive for dyspnea on exertion.   Respiratory:  Positive for shortness of breath.    All other systems reviewed and are negative.        All other systems were reviewed and were negative.    Patient Active Problem List   Diagnosis   • HOCM (hypertrophic obstructive cardiomyopathy)   • Essential hypertension   • Hypercholesterolemia   • Brain tumor   • Cerebral edema   • Pulmonary hypertension        family history includes Cancer in his father and mother; Heart disease in his brother and father; Lupus in his mother.     reports that he quit smoking about 18 years ago. His smoking use included cigarettes. He started smoking about 37 years ago. He has a 19.00 pack-year smoking history. He has never used smokeless tobacco. He reports current alcohol use of about 1.0 standard drink of alcohol per week. He reports that he does not use drugs.    No Known Allergies    Social Determinants of Health     Tobacco Use: Medium Risk (1/10/2024)    Patient History    • Smoking Tobacco Use: Former    • Smokeless Tobacco Use: Never    • Passive Exposure: Not on file   Alcohol Use: Not on file   Financial Resource Strain: Not on file   Food Insecurity: Not on file   Transportation Needs: Not on file   Physical Activity: Not on file   Stress: Not on file   Social Connections: Unknown (10/8/2023)    Family and Community Support    • Help with Day-to-Day Activities: Not on file    • Lonely or Isolated: Not on file   Interpersonal Safety: Unknown (10/8/2023)    Abuse Screen    • Unsafe at Home or Work/School: Not on file    • Feels Threatened by Someone?: Not on file    • Does Anyone Keep You from Contacting Others or Doint Things Outside the Home?: Not on file    • Physical Sign of Abuse Present: Not on file   Depression: Not on file   Housing Stability: Unknown (10/8/2023)    Housing Stability    • Current Living Arrangements: Not on file    • Potentially Unsafe Housing Conditions: Not on file   Utilities: Not on file   Health Literacy: Unknown (10/8/2023)    Education    • Help with school or training?: Not on file    • Preferred Language: Not on file   Employment: Unknown (10/8/2023)    Employment    • Do you want help finding or keeping work or a job?: Not on file   Disabilities: Unknown (10/8/2023)    Disabilities    • Concentrating, Remembering, or Making Decisions Difficulty: Not on file    • Doing Errands  Independently Difficulty: Not on file        Physical Activity: Not on file          Current Outpatient Medications:   •  Aspirin 81 MG capsule, aspirin 81 mg capsule  Take 1 capsule every day by oral route., Disp: , Rfl:   •  atorvastatin (LIPITOR) 20 MG tablet, Take 1 tablet by mouth Every Evening., Disp: 90 tablet, Rfl: 3  •  butalbital-aspirin-caffeine-codeine (FIORINAL WITH CODEINE) -18-30 MG capsule, Take 1 capsule by mouth Every 4 (Four) Hours As Needed for Headache., Disp: , Rfl:   •  escitalopram (LEXAPRO) 10 MG tablet, Take 1 tablet by mouth Daily., Disp: , Rfl:   •  levETIRAcetam (KEPPRA) 500 MG tablet, Take 2 tablets by mouth 2 (Two) Times a Day., Disp: , Rfl:   •  Mavacamten (Camzyos) 2.5 MG capsule, Take 2.5 mg by mouth Daily., Disp: 30 capsule, Rfl: 0  •  metoprolol succinate XL (TOPROL-XL) 50 MG 24 hr tablet, Take 1 tablet by mouth Daily., Disp: 90 tablet, Rfl: 3  •  multivitamin with minerals tablet tablet, Take 1 tablet by mouth Daily., Disp: , Rfl:   •  pantoprazole (Protonix) 40 MG EC tablet, Take 1 tablet by mouth Daily., Disp: , Rfl:   •  verapamil SR (CALAN-SR) 120 MG CR tablet, TAKE 1 TABLET TWICE A DAY, Disp: 180 tablet, Rfl: 1    Current Facility-Administered Medications:   •  nitroglycerin (NITROSTAT) SL tablet 0.4 mg, 0.4 mg, Sublingual, Q5 Min PRN, Max Husain MD  •  sodium chloride 0.9 % flush 10 mL, 10 mL, Intravenous, PRN, Max Husain MD    Vital Sign Review:     Vitals:    01/10/24 1252   BP: 126/88   Pulse: 60   SpO2: 96%       PP:high  Pulse Ox: normal oxygenation on room air    Body mass index is 25.77 kg/m².      01/10/24  1252   Weight: 93.5 kg (206 lb 3.2 oz)         Physical Exam:    Vitals reviewed.   Constitutional:       General: Not in acute distress.     Appearance: Normal and healthy appearance. Well-developed, well-groomed and not in distress. Not ill-appearing, toxic-appearing or diaphoretic.      Interventions: Not intubated.  Eyes:       Conjunctiva/sclera: Conjunctivae normal.      Pupils: Pupils are equal, round, and reactive to light.   Neck:      Vascular: No hepatojugular reflux, JVD or JVR. JVD normal with 6 cm of water.   Pulmonary:      Effort: Pulmonary effort is normal. No tachypnea, bradypnea, accessory muscle usage, prolonged expiration, respiratory distress or retractions. Not intubated.      Breath sounds: Normal breath sounds and air entry. No stridor, decreased air movement or transmitted upper airway sounds. No decreased breath sounds. No wheezing. No rhonchi. No rales.   Cardiovascular:      PMI at left midclavicular line. Normal rate. Regular rhythm. S1 with normal intensity. S2 with normal intensity.       Murmurs: There is a grade 1/6 mid frequency early systolic murmur at the ULSB.      No gallop.  No click. No rub.   Neurological:      General: No focal deficit present.      Mental Status: Alert and oriented to person, place and time.   Psychiatric:         Behavior: Behavior is cooperative.            DATA REVIEWED:       ---------------------------------------------------  TTE/PATRICK:    Results for orders placed during the hospital encounter of 01/10/24    Adult Transthoracic Echo Limited W/ Cont if Necessary Per Protocol    Interpretation Summary  •  Limited echocardiogram for mavacamten  •  Left ventricular systolic function is normal. Calculated left ventricular EF = 67.9%.  Diastolic function is normal.  •  Moderate asymmetric septal hypertrophy.  LVOT gradient 5 mmHg.  The findings are consistent with hypertrophic cardiomyopathy.  •  Normal right ventricular cavity size, wall thickness, systolic function and septal motion noted.      My interpretation of the TTE is below.     LVEF is 67%.  Moderate asymmetric septal hypertrophy.  LVOT gradient 5 mmHg.        Lab Results   Component Value Date    GLUCOSE 126 (H) 03/16/2022    BUN 13 03/16/2022    CREATININE 0.88 03/16/2022    EGFRIFNONA 75 09/14/2021    BCR 14.8 03/16/2022  "   K 4.3 03/16/2022    CO2 23.7 03/16/2022    CALCIUM 9.4 03/16/2022    ALBUMIN 4.70 03/16/2022    AST 16 03/16/2022    ALT 14 03/16/2022     Lab Results   Component Value Date    WBC 19.54 (H) 03/16/2022    HGB 15.3 03/16/2022    HCT 44.1 03/16/2022    MCV 85.3 03/16/2022     03/16/2022     No results found for: \"CHOL\", \"CHLPL\", \"TRIG\", \"HDL\", \"LDL\", \"LDLDIRECT\"  Lab Results   Component Value Date    TSH 0.668 03/16/2022     Lab Results   Component Value Date    CKTOTAL 96 03/16/2022    TROPONINT <0.010 03/16/2022     No results found for: \"HGBA1C\"  No results found for: \"DDIMER\"  Lab Results   Component Value Date    ALT 14 03/16/2022     No results found for: \"HGBA1C\"  Lab Results   Component Value Date    CREATININE 0.88 03/16/2022     No results found for: \"IRON\", \"TIBC\", \"FERRITIN\"  Lab Results   Component Value Date    INR 1.01 10/17/2022    INR 1.1 04/12/2022    INR 1.0 03/16/2022    PROTIME 11.7 10/17/2022    PROTIME 11.9 (H) 04/12/2022    PROTIME 10.9 03/16/2022       PAH RISK ASSESSMENT:        Assessment & Plan     1. Pulmonary hypertension    2. HOCM (hypertrophic obstructive cardiomyopathy)      1.  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  This is likely from worsening LVOT gradients and MR from HOCM.  He had an abnormal PA AT.   -Arrange PFTs  - No current indication for PAH-specific medications.    2.  NYHA stage C; functional class II.  Today, he is euvolemic and perfused.    2D TTE was reviewed today and shows acceptable LVEF.  However, his LVOT gradients have decreased to 5 mmHg.  He may remain on mavacamten at 2.5 mg and follow-up in 4 weeks.    -ICD risk score was calculated today.  ICD is not indicated at this time.  - First-degree relatives were recommended to be screened  -Mavacamten 2.5mg  -REMS  -Continue follow-ups TTES and appts    ORDERS PLACED TODAY:  Orders Placed This Encounter   Procedures   • Adult Transthoracic Echo Limited W/ Cont if Necessary Per Protocol          MEDS ORDERED " TODAY:    New Medications Ordered This Visit   Medications   • Mavacamten (Camzyos) 2.5 MG capsule     Sig: Take 2.5 mg by mouth Daily.     Dispense:  30 capsule     Refill:  0        MEDS DISCONTINUED TODAY:    Medications Discontinued During This Encounter   Medication Reason   • Mavacamten (Camzyos) 2.5 MG capsule Reorder                Return for 2D TTE results.          This document has been electronically signed by James Yusuf MD PhD on January 10, 2024 13:12 EST      James Yusuf M.D., Ph.D., [facility]  System Medical Director for HF and PAH

## 2024-01-10 NOTE — PROGRESS NOTES
Heart Failure & Pulmonary Arterial Hypertension Clinic  [facility]  James Yusuf M.D., Ph.D., [facility]       James Yusuf MD PhD  [address]  [place] [address]    Thank you for asking me to see Santhosh Pope.     History of Present Illness  This is a 53 y.o. male with:    1. PH by abnormal PA AT  2. HOCM      Santhosh Pope is a 53 y.o. male who presents today.  The patient is typically seen by Billy Kim MD.  The patient's cardiologist is Dr. Husain.     The patient returns to clinic today for HOCM.  He was started on mavacamten at 5 mg.  He was taken down to 2.5mg since his LVOT <20mmHg. This is his second month of being on the medication.  He had a 2D TTE prior to his appointment today.  He presents for results.  Stable exercise intolerance.  He is able to obtain and afford his mavacamten.  Otherwise, he is on verapamil and Toprol-XL.  Stable exercise intolerance.      Review of Systems - Review of Systems   Cardiovascular:  Positive for dyspnea on exertion.   Respiratory:  Positive for shortness of breath.    All other systems reviewed and are negative.        All other systems were reviewed and were negative.    Patient Active Problem List   Diagnosis    HOCM (hypertrophic obstructive cardiomyopathy)    Essential hypertension    Hypercholesterolemia    Brain tumor    Cerebral edema    Pulmonary hypertension       family history includes Cancer in his father and mother; Heart disease in his brother and father; Lupus in his mother.     reports that he quit smoking about 18 years ago. His smoking use included cigarettes. He started smoking about 37 years ago. He has a 19.00 pack-year smoking history. He has never used smokeless tobacco. He reports current alcohol use of about 1.0 standard drink of alcohol per week. He reports that he does not use drugs.    No Known Allergies    Social Determinants of Health     Tobacco Use: Medium Risk (1/10/2024)     Patient History     Smoking Tobacco Use: Former     Smokeless Tobacco Use: Never     Passive Exposure: Not on file   Alcohol Use: Not on file   Financial Resource Strain: Not on file   Food Insecurity: Not on file   Transportation Needs: Not on file   Physical Activity: Not on file   Stress: Not on file   Social Connections: Unknown (10/8/2023)    Family and Community Support     Help with Day-to-Day Activities: Not on file     Lonely or Isolated: Not on file   Interpersonal Safety: Unknown (10/8/2023)    Abuse Screen     Unsafe at Home or Work/School: Not on file     Feels Threatened by Someone?: Not on file     Does Anyone Keep You from Contacting Others or Doint Things Outside the Home?: Not on file     Physical Sign of Abuse Present: Not on file   Depression: Not on file   Housing Stability: Unknown (10/8/2023)    Housing Stability     Current Living Arrangements: Not on file     Potentially Unsafe Housing Conditions: Not on file   Utilities: Not on file   Health Literacy: Unknown (10/8/2023)    Education     Help with school or training?: Not on file     Preferred Language: Not on file   Employment: Unknown (10/8/2023)    Employment     Do you want help finding or keeping work or a job?: Not on file   Disabilities: Unknown (10/8/2023)    Disabilities     Concentrating, Remembering, or Making Decisions Difficulty: Not on file     Doing Errands Independently Difficulty: Not on file        Physical Activity: Not on file          Current Outpatient Medications:     Aspirin 81 MG capsule, aspirin 81 mg capsule  Take 1 capsule every day by oral route., Disp: , Rfl:     atorvastatin (LIPITOR) 20 MG tablet, Take 1 tablet by mouth Every Evening., Disp: 90 tablet, Rfl: 3    butalbital-aspirin-caffeine-codeine (FIORINAL WITH CODEINE) -49-30 MG capsule, Take 1 capsule by mouth Every 4 (Four) Hours As Needed for Headache., Disp: , Rfl:     escitalopram (LEXAPRO) 10 MG tablet, Take 1 tablet by mouth Daily., Disp: ,  Rfl:     levETIRAcetam (KEPPRA) 500 MG tablet, Take 2 tablets by mouth 2 (Two) Times a Day., Disp: , Rfl:     Mavacamten (Camzyos) 2.5 MG capsule, Take 2.5 mg by mouth Daily., Disp: 30 capsule, Rfl: 0    metoprolol succinate XL (TOPROL-XL) 50 MG 24 hr tablet, Take 1 tablet by mouth Daily., Disp: 90 tablet, Rfl: 3    multivitamin with minerals tablet tablet, Take 1 tablet by mouth Daily., Disp: , Rfl:     pantoprazole (Protonix) 40 MG EC tablet, Take 1 tablet by mouth Daily., Disp: , Rfl:     verapamil SR (CALAN-SR) 120 MG CR tablet, TAKE 1 TABLET TWICE A DAY, Disp: 180 tablet, Rfl: 1    Current Facility-Administered Medications:     nitroglycerin (NITROSTAT) SL tablet 0.4 mg, 0.4 mg, Sublingual, Q5 Min PRN, Max Husain MD    sodium chloride 0.9 % flush 10 mL, 10 mL, Intravenous, PRN, Max Husain MD    Vital Sign Review:     Vitals:    01/10/24 1252   BP: 126/88   Pulse: 60   SpO2: 96%       PP:high  Pulse Ox: normal oxygenation on room air    Body mass index is 25.77 kg/m².      01/10/24  1252   Weight: 93.5 kg (206 lb 3.2 oz)         Physical Exam:    Vitals reviewed.   Constitutional:       General: Not in acute distress.     Appearance: Normal and healthy appearance. Well-developed, well-groomed and not in distress. Not ill-appearing, toxic-appearing or diaphoretic.      Interventions: Not intubated.  Eyes:      Conjunctiva/sclera: Conjunctivae normal.      Pupils: Pupils are equal, round, and reactive to light.   Neck:      Vascular: No hepatojugular reflux, JVD or JVR. JVD normal with 6 cm of water.   Pulmonary:      Effort: Pulmonary effort is normal. No tachypnea, bradypnea, accessory muscle usage, prolonged expiration, respiratory distress or retractions. Not intubated.      Breath sounds: Normal breath sounds and air entry. No stridor, decreased air movement or transmitted upper airway sounds. No decreased breath sounds. No wheezing. No rhonchi. No rales.   Cardiovascular:      PMI at  "left midclavicular line. Normal rate. Regular rhythm. S1 with normal intensity. S2 with normal intensity.       Murmurs: There is a grade 1/6 mid frequency early systolic murmur at the ULSB.      No gallop.  No click. No rub.   Neurological:      General: No focal deficit present.      Mental Status: Alert and oriented to person, place and time.   Psychiatric:         Behavior: Behavior is cooperative.            DATA REVIEWED:       ---------------------------------------------------  TTE/PATRICK:    Results for orders placed during the hospital encounter of 01/10/24    Adult Transthoracic Echo Limited W/ Cont if Necessary Per Protocol    Interpretation Summary    Limited echocardiogram for mavacamten    Left ventricular systolic function is normal. Calculated left ventricular EF = 67.9%.  Diastolic function is normal.    Moderate asymmetric septal hypertrophy.  LVOT gradient 5 mmHg.  The findings are consistent with hypertrophic cardiomyopathy.    Normal right ventricular cavity size, wall thickness, systolic function and septal motion noted.      My interpretation of the TTE is below.     LVEF is 67%.  Moderate asymmetric septal hypertrophy.  LVOT gradient 5 mmHg.        Lab Results   Component Value Date    GLUCOSE 126 (H) 03/16/2022    BUN 13 03/16/2022    CREATININE 0.88 03/16/2022    EGFRIFNONA 75 09/14/2021    BCR 14.8 03/16/2022    K 4.3 03/16/2022    CO2 23.7 03/16/2022    CALCIUM 9.4 03/16/2022    ALBUMIN 4.70 03/16/2022    AST 16 03/16/2022    ALT 14 03/16/2022     Lab Results   Component Value Date    WBC 19.54 (H) 03/16/2022    HGB 15.3 03/16/2022    HCT 44.1 03/16/2022    MCV 85.3 03/16/2022     03/16/2022     No results found for: \"CHOL\", \"CHLPL\", \"TRIG\", \"HDL\", \"LDL\", \"LDLDIRECT\"  Lab Results   Component Value Date    TSH 0.668 03/16/2022     Lab Results   Component Value Date    CKTOTAL 96 03/16/2022    TROPONINT <0.010 03/16/2022     No results found for: \"HGBA1C\"  No results found for: " "\"DDIMER\"  Lab Results   Component Value Date    ALT 14 03/16/2022     No results found for: \"HGBA1C\"  Lab Results   Component Value Date    CREATININE 0.88 03/16/2022     No results found for: \"IRON\", \"TIBC\", \"FERRITIN\"  Lab Results   Component Value Date    INR 1.01 10/17/2022    INR 1.1 04/12/2022    INR 1.0 03/16/2022    PROTIME 11.7 10/17/2022    PROTIME 11.9 (H) 04/12/2022    PROTIME 10.9 03/16/2022       PAH RISK ASSESSMENT:        Assessment & Plan      1. Pulmonary hypertension    2. HOCM (hypertrophic obstructive cardiomyopathy)      1.  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  This is likely from worsening LVOT gradients and MR from HOCM.  He had an abnormal PA AT.   -Arrange PFTs  - No current indication for PAH-specific medications.    2.  NYHA stage C; functional class II.  Today, he is euvolemic and perfused.    2D TTE was reviewed today and shows acceptable LVEF.  However, his LVOT gradients have decreased to 5 mmHg. He is likely a slow metabolizer. Will need to D/C mavacamten and check TTE in 4 weeks.     -ICD risk score was calculated today.  ICD is not indicated at this time.  - First-degree relatives were recommended to be screened  -STOP Mavacamten. TTE in 4 weeks.   -REMS      ORDERS PLACED TODAY:  Orders Placed This Encounter   Procedures    Adult Transthoracic Echo Limited W/ Cont if Necessary Per Protocol          MEDS ORDERED TODAY:    New Medications Ordered This Visit   Medications    Mavacamten (Camzyos) 2.5 MG capsule     Sig: Take 2.5 mg by mouth Daily.     Dispense:  30 capsule     Refill:  0        MEDS DISCONTINUED TODAY:    Medications Discontinued During This Encounter   Medication Reason    Mavacamten (Camzyos) 2.5 MG capsule Reorder                Return for 2D TTE results.          This document has been electronically signed by James Yusuf MD PhD on January 10, 2024 13:12 EST      James Yusuf M.D., Ph.D., [facility] Heart Failure and Pulmonary " Hypertension Clinic  System Medical Director for HF and PAH

## 2024-01-11 ENCOUNTER — TELEPHONE (OUTPATIENT)
Dept: CARDIOLOGY | Facility: HOSPITAL | Age: 54
End: 2024-01-11
Payer: COMMERCIAL

## 2024-01-11 NOTE — ADDENDUM NOTE
Encounter addended by: James Yusuf MD PhD on: 1/11/2024 2:13 PM   Actions taken: Clinical Note Signed

## 2024-01-11 NOTE — TELEPHONE ENCOUNTER
Per dosing guide, given VLVOT < 20 mmHg at 8-wk f/u echo, will actually have patient stop Camzyos x 4 weeks. Confirmed with Dr. Yusuf. Patient/patient's wife/pharmacy updated. Advised them to keep echo and appt on 2/7/24.      Anca Holguin, PharmD, BCACP  [facility] Failure [facility]  Phone: [phone]

## 2024-01-31 ENCOUNTER — TELEPHONE (OUTPATIENT)
Dept: CARDIOLOGY | Facility: HOSPITAL | Age: 54
End: 2024-01-31
Payer: COMMERCIAL

## 2024-01-31 RX ORDER — LORAZEPAM 0.5 MG/1
0.5 TABLET ORAL AS NEEDED
COMMUNITY
Start: 2024-01-30

## 2024-01-31 NOTE — TELEPHONE ENCOUNTER
Patient's wife called. She states patient was seen by his PCP who was starting lorazepam PRN. She wanted to confirm it was okay to take with his Camzyos. DI report ran. Advised her it was okay to take. I updated his med list. No further questions at this time.      Anca Holguin, PharmD, BCACP  [facility] Heart Failure Clinic  Phone: [phone]

## 2024-02-07 ENCOUNTER — HOSPITAL ENCOUNTER (OUTPATIENT)
Dept: CARDIOLOGY | Facility: HOSPITAL | Age: 54
Discharge: HOME | End: 2024-02-07
Payer: COMMERCIAL

## 2024-02-07 VITALS
BODY MASS INDEX: 25.84 KG/M2 | SYSTOLIC BLOOD PRESSURE: 126 MMHG | DIASTOLIC BLOOD PRESSURE: 78 MMHG | OXYGEN SATURATION: 95 % | HEART RATE: 60 BPM | HEIGHT: 75 IN | WEIGHT: 207.8 LBS

## 2024-02-07 VITALS
HEIGHT: 75 IN | HEART RATE: 63 BPM | OXYGEN SATURATION: 97 % | WEIGHT: 206 LBS | SYSTOLIC BLOOD PRESSURE: 108 MMHG | DIASTOLIC BLOOD PRESSURE: 74 MMHG | BODY MASS INDEX: 25.61 KG/M2

## 2024-02-07 DIAGNOSIS — I42.1 HOCM (HYPERTROPHIC OBSTRUCTIVE CARDIOMYOPATHY): ICD-10-CM

## 2024-02-07 DIAGNOSIS — I27.20 PULMONARY HYPERTENSION: Primary | ICD-10-CM

## 2024-02-07 LAB
AORTIC DIMENSIONLESS INDEX: 0.8 (DI)
AV HCM GRAD VALS: 12 MMHG
AV LVOT PEAK GRADIENT: 8 MMHG
BH CV ECHO LEFT VENTRICLE GLOBAL LONGITUDINAL STRAIN: -22.6 %
BH CV ECHO LEFT VENTRICLE MID CAVITARY GRADIENT: 12 MMHG
BH CV ECHO MEAS - AO MAX PG: 11 MMHG
BH CV ECHO MEAS - AO MEAN PG: 6 MMHG
BH CV ECHO MEAS - AO V2 MAX: 166 CM/SEC
BH CV ECHO MEAS - AO V2 VTI: 38.3 CM
BH CV ECHO MEAS - AVA(I,D): 2.43 CM2
BH CV ECHO MEAS - EDV(CUBED): 97.8 ML
BH CV ECHO MEAS - EDV(MOD-SP2): 91 ML
BH CV ECHO MEAS - EDV(MOD-SP4): 104 ML
BH CV ECHO MEAS - EF(MOD-BP): 70.9 %
BH CV ECHO MEAS - EF(MOD-SP2): 69.2 %
BH CV ECHO MEAS - EF(MOD-SP4): 72.1 %
BH CV ECHO MEAS - ESV(CUBED): 12.9 ML
BH CV ECHO MEAS - ESV(MOD-SP2): 28 ML
BH CV ECHO MEAS - ESV(MOD-SP4): 29 ML
BH CV ECHO MEAS - FS: 49.1 %
BH CV ECHO MEAS - IVS/LVPW: 1.05 CM
BH CV ECHO MEAS - IVSD: 1.32 CM
BH CV ECHO MEAS - LAT PEAK E' VEL: 6.5 CM/SEC
BH CV ECHO MEAS - LV DIASTOLIC VOL/BSA (35-75): 46.8 CM2
BH CV ECHO MEAS - LV MASS(C)D: 228 GRAMS
BH CV ECHO MEAS - LV MAX PG: 9 MMHG
BH CV ECHO MEAS - LV MEAN PG: 4 MMHG
BH CV ECHO MEAS - LV SYSTOLIC VOL/BSA (12-30): 13.1 CM2
BH CV ECHO MEAS - LV V1 MAX: 150 CM/SEC
BH CV ECHO MEAS - LV V1 VTI: 31.6 CM
BH CV ECHO MEAS - LVIDD: 4.6 CM
BH CV ECHO MEAS - LVIDS: 2.34 CM
BH CV ECHO MEAS - LVOT AREA: 3 CM2
BH CV ECHO MEAS - LVOT DIAM: 1.94 CM
BH CV ECHO MEAS - LVPWD: 1.26 CM
BH CV ECHO MEAS - MED PEAK E' VEL: 6.4 CM/SEC
BH CV ECHO MEAS - MV A DUR: 0.1 SEC
BH CV ECHO MEAS - MV A MAX VEL: 74.7 CM/SEC
BH CV ECHO MEAS - MV DEC SLOPE: 629.2 CM/SEC2
BH CV ECHO MEAS - MV DEC TIME: 0.19 SEC
BH CV ECHO MEAS - MV E MAX VEL: 82 CM/SEC
BH CV ECHO MEAS - MV E/A: 1.1
BH CV ECHO MEAS - MV MAX PG: 3.3 MMHG
BH CV ECHO MEAS - MV MEAN PG: 1.58 MMHG
BH CV ECHO MEAS - MV P1/2T: 44.9 MSEC
BH CV ECHO MEAS - MV V2 VTI: 28.5 CM
BH CV ECHO MEAS - MVA(P1/2T): 4.9 CM2
BH CV ECHO MEAS - MVA(VTI): 3.3 CM2
BH CV ECHO MEAS - SI(MOD-SP2): 28.4 ML/M2
BH CV ECHO MEAS - SI(MOD-SP4): 33.8 ML/M2
BH CV ECHO MEAS - SV(LVOT): 93.2 ML
BH CV ECHO MEAS - SV(MOD-SP2): 63 ML
BH CV ECHO MEAS - SV(MOD-SP4): 75 ML
BH CV ECHO MEASUREMENTS AVERAGE E/E' RATIO: 12.71
BH CV VAS BP LEFT ARM: NORMAL MMHG
BH CV XLRA - TDI S': 12 CM/SEC
LEFT ATRIUM VOLUME INDEX: 24.4 ML/M2

## 2024-02-07 PROCEDURE — 93321 DOPPLER ECHO F-UP/LMTD STD: CPT

## 2024-02-07 PROCEDURE — 93325 DOPPLER ECHO COLOR FLOW MAPG: CPT

## 2024-02-07 PROCEDURE — 25510000001 PERFLUTREN (DEFINITY) 8.476 MG IN SODIUM CHLORIDE (PF) 0.9 % 10 ML INJECTION: Performed by: INTERNAL MEDICINE

## 2024-02-07 PROCEDURE — 93308 TTE F-UP OR LMTD: CPT

## 2024-02-07 PROCEDURE — 94726 PLETHYSMOGRAPHY LUNG VOLUMES: CPT | Performed by: INTERNAL MEDICINE

## 2024-02-07 PROCEDURE — 93356 MYOCRD STRAIN IMG SPCKL TRCK: CPT

## 2024-02-07 PROCEDURE — G0463 HOSPITAL OUTPT CLINIC VISIT: HCPCS

## 2024-02-07 RX ADMIN — PERFLUTREN 1.5 ML: 6.52 INJECTION, SUSPENSION INTRAVENOUS at 11:52

## 2024-02-07 NOTE — LETTER
February 7, 2024       No Recipients    Patient: Santhosh Pope   YOB: 1970   Date of Visit: 2/7/2024     Dear Billy Kim MD:       Thank you for referring Santhosh Pope to me for evaluation. Below are the relevant portions of my assessment and plan of care.    If you have questions, please do not hesitate to call me. I look forward to following Santhosh along with you.         Sincerely,        James Yusuf MD PhD        CC:   No Recipients    James Yusuf MD PhD  02/07/24 1322  Signed  Heart Failure & Pulmonary Arterial Hypertension Clinic  [facility]  James Yusuf M.D., Ph.D., [facility]       Max Husain MD  [address] [address]    Thank you for asking me to see Santhosh Pope.     History of Present Illness  This is a 53 y.o. male with:    1. PH by abnormal PA AT  2. HOCM      Santhosh Pope is a 53 y.o. male who presents today.  The patient is typically seen by Billy Kim MD.  The patient's cardiologist is Dr. Husain.     The patient returns to the clinic today for HOCM.  He was commenced on mavacamten at 5 mg.  He had to be taken down to 2.5 mg since his LVOT was less than 20 mmHg.  He had a 2D TTE last month which showed LVOT gradients less than 20 mmHg, so mavacamten was discontinued.  He has been off the medication for 4 weeks.  He had a 2D TTE prior to his appointment today.  He returns for results.  He reports slight worsening in exercise intolerance since being off mavacamten.  He remains on verapamil and Toprol XL.      Review of Systems - Review of Systems   Cardiovascular:  Positive for dyspnea on exertion.   Respiratory:  Positive for shortness of breath.    All other systems reviewed and are negative.        All other systems were reviewed and were negative.    Patient Active Problem List   Diagnosis   • HOCM (hypertrophic obstructive cardiomyopathy)   • Essential hypertension   •  Hypercholesterolemia   • Brain tumor   • Cerebral edema   • Pulmonary hypertension       family history includes Cancer in his father and mother; Heart disease in his brother and father; Lupus in his mother.     reports that he quit smoking about 18 years ago. His smoking use included cigarettes. He started smoking about 37 years ago. He has a 19.00 pack-year smoking history. He has never used smokeless tobacco. He reports current alcohol use of about 1.0 standard drink of alcohol per week. He reports that he does not use drugs.    No Known Allergies    Social Determinants of Health     Tobacco Use: Medium Risk (2/7/2024)    Patient History    • Smoking Tobacco Use: Former    • Smokeless Tobacco Use: Never    • Passive Exposure: Not on file   Alcohol Use: Not on file   Financial Resource Strain: Not on file   Food Insecurity: Not on file   Transportation Needs: Not on file   Physical Activity: Not on file   Stress: Not on file   Social Connections: Unknown (10/8/2023)    Family and Community Support    • Help with Day-to-Day Activities: Not on file    • Lonely or Isolated: Not on file   Interpersonal Safety: Unknown (10/8/2023)    Abuse Screen    • Unsafe at Home or Work/School: Not on file    • Feels Threatened by Someone?: Not on file    • Does Anyone Keep You from Contacting Others or Doint Things Outside the Home?: Not on file    • Physical Sign of Abuse Present: Not on file   Depression: Not on file   Housing Stability: Unknown (10/8/2023)    Housing Stability    • Current Living Arrangements: Not on file    • Potentially Unsafe Housing Conditions: Not on file   Utilities: Not on file   Health Literacy: Unknown (10/8/2023)    Education    • Help with school or training?: Not on file    • Preferred Language: Not on file   Employment: Unknown (10/8/2023)    Employment    • Do you want help finding or keeping work or a job?: Not on file   Disabilities: Unknown (10/8/2023)    Disabilities    • Concentrating,  Remembering, or Making Decisions Difficulty: Not on file    • Doing Errands Independently Difficulty: Not on file        Physical Activity: Not on file          Current Outpatient Medications:   •  Aspirin 81 MG capsule, aspirin 81 mg capsule  Take 1 capsule every day by oral route., Disp: , Rfl:   •  atorvastatin (LIPITOR) 20 MG tablet, Take 1 tablet by mouth Every Evening., Disp: 90 tablet, Rfl: 3  •  butalbital-aspirin-caffeine-codeine (FIORINAL WITH CODEINE) -89-30 MG capsule, Take 1 capsule by mouth Every 4 (Four) Hours As Needed for Headache., Disp: , Rfl:   •  escitalopram (LEXAPRO) 10 MG tablet, Take 1 tablet by mouth Daily., Disp: , Rfl:   •  levETIRAcetam (KEPPRA) 500 MG tablet, Take 2 tablets by mouth 2 (Two) Times a Day., Disp: , Rfl:   •  LORazepam (ATIVAN) 0.5 MG tablet, Take 1 tablet by mouth As Needed for Anxiety., Disp: , Rfl:   •  metoprolol succinate XL (TOPROL-XL) 50 MG 24 hr tablet, Take 1 tablet by mouth Daily., Disp: 90 tablet, Rfl: 3  •  multivitamin with minerals tablet tablet, Take 1 tablet by mouth Daily., Disp: , Rfl:   •  pantoprazole (Protonix) 40 MG EC tablet, Take 1 tablet by mouth Daily., Disp: , Rfl:   •  verapamil SR (CALAN-SR) 120 MG CR tablet, TAKE 1 TABLET TWICE A DAY, Disp: 180 tablet, Rfl: 1    Current Facility-Administered Medications:   •  nitroglycerin (NITROSTAT) SL tablet 0.4 mg, 0.4 mg, Sublingual, Q5 Min PRN, Max Husain MD  •  sodium chloride 0.9 % flush 10 mL, 10 mL, Intravenous, PRN, Max Husain MD    Vital Sign Review:     Vitals:    02/07/24 1253   BP: 126/78   Pulse: 60   SpO2: 95%         PP:high  Pulse Ox: normal oxygenation on room air    Body mass index is 25.75 kg/m².      02/07/24  1253   Weight: 94.3 kg (207 lb 12.8 oz)           Physical Exam:    Vitals reviewed.   Constitutional:       General: Not in acute distress.     Appearance: Normal and healthy appearance. Well-developed, well-groomed and not in distress. Not  ill-appearing, toxic-appearing or diaphoretic.      Interventions: Not intubated.  Eyes:      Conjunctiva/sclera: Conjunctivae normal.      Pupils: Pupils are equal, round, and reactive to light.   Neck:      Vascular: No hepatojugular reflux, JVD or JVR. JVD normal with 6 cm of water.   Pulmonary:      Effort: Pulmonary effort is normal. No tachypnea, bradypnea, accessory muscle usage, prolonged expiration, respiratory distress or retractions. Not intubated.      Breath sounds: Normal breath sounds and air entry. No stridor, decreased air movement or transmitted upper airway sounds. No decreased breath sounds. No wheezing. No rhonchi. No rales.   Cardiovascular:      PMI at left midclavicular line. Normal rate. Regular rhythm. S1 with normal intensity. S2 with normal intensity.       Murmurs: There is a grade 1/6 mid frequency early systolic murmur at the ULSB.      No gallop.  No click. No rub.   Neurological:      General: No focal deficit present.      Mental Status: Alert and oriented to person, place and time.   Psychiatric:         Behavior: Behavior is cooperative.          DATA REVIEWED:       ---------------------------------------------------  TTE/PATRICK:    Results for orders placed during the hospital encounter of 02/07/24    Adult Transthoracic Echo Limited W/ Cont if Necessary Per Protocol    Interpretation Summary  •  Limited echocardiogram for mavacamten therapy  •  Left ventricular systolic function is hyperdynamic.  Calculated left ventricular EF = 70.9%.  Normal diastolic function.  •  Left ventricular wall thickness is consistent with moderate septal asymmetric hypertrophy.  Findings consistent with hypertrophic cardiomyopathy.  LVOT gradient 12 mmHg.  •  Normal right ventricular cavity size, wall thickness, systolic function and septal motion noted.      My interpretation of the TTE is below.     LVEF is 70% and hyperdynamic.  Moderate septal asymmetric hypertrophy consistent with hypertrophic  "cardiomyopathy.  LVOT gradient 12 mmHg.        Lab Results   Component Value Date    GLUCOSE 126 (H) 03/16/2022    BUN 13 03/16/2022    CREATININE 0.88 03/16/2022    EGFRIFNONA 75 09/14/2021    BCR 14.8 03/16/2022    K 4.3 03/16/2022    CO2 23.7 03/16/2022    CALCIUM 9.4 03/16/2022    ALBUMIN 4.70 03/16/2022    AST 16 03/16/2022    ALT 14 03/16/2022     Lab Results   Component Value Date    WBC 19.54 (H) 03/16/2022    HGB 15.3 03/16/2022    HCT 44.1 03/16/2022    MCV 85.3 03/16/2022     03/16/2022     No results found for: \"CHOL\", \"CHLPL\", \"TRIG\", \"HDL\", \"LDL\", \"LDLDIRECT\"  Lab Results   Component Value Date    TSH 0.668 03/16/2022     Lab Results   Component Value Date    CKTOTAL 96 03/16/2022    TROPONINT <0.010 03/16/2022     No results found for: \"HGBA1C\"  No results found for: \"DDIMER\"  Lab Results   Component Value Date    ALT 14 03/16/2022     No results found for: \"HGBA1C\"  Lab Results   Component Value Date    CREATININE 0.88 03/16/2022     No results found for: \"IRON\", \"TIBC\", \"FERRITIN\"  Lab Results   Component Value Date    INR 1.01 10/17/2022    INR 1.1 04/12/2022    INR 1.0 03/16/2022    PROTIME 11.7 10/17/2022    PROTIME 11.9 (H) 04/12/2022    PROTIME 10.9 03/16/2022       PAH RISK ASSESSMENT:  ReDs Vest    Performed by: James Yusuf MD PhD  Authorized by: James Yusuf MD PhD    Method of arrival:  Ambulatory  Weighing self daily:  No  Monitoring heart failure zones:  Yes  Today's HF zone:  Green  Taking medications as prescribed:  Yes  Edema:  No  Shortness of air:  Yes  ReDS value:  30        Assessment & Plan      1. Pulmonary hypertension    2. HOCM (hypertrophic obstructive cardiomyopathy) [I42.1]        1.  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  This is likely from worsening LVOT gradients and MR from  HOCM.  He did have a reduction in his LVOT gradient with subsequent reduction in his pulmonary pressures.    - Obtain PFTs  - No current indication for PAH-specific " medications     2.    NYHA Stage C FC-Class II: Slight limitation of physical activity. Comfortable at rest Ordinary physical activity results in fatigue, palpitation, dyspnea (shortness of breath)   LV EF: Patient's last echocardiogram was on February 7, 2024 and revealed LVEF greater than 70%.  LVH: moderate.    Peak LVOT Gradient: 12 mmHg Holter showed minimal in degree PVCs.        Today, I looked at the patient's 2D TTE images with the patient and explained their condition.  We discussed the typical workup and treatment of HOCM.  We discussed SRT options, including ASA. The patient does not want a referral to discuss SRT.  Medication management was also discussed with the patient.    Mavacamten therapy was described.  Details of the clinical trial were described including potential side effects of reduction in LVEF.  The REMS program was described to the patient in detail including the need for repeated 2D TTE's.  Specialty pharmacy was explained to the patient.  The patient was made aware that echocardiograms have to be performed as scheduled, or the medication will not be refilled.  This office follows FDA guidelines in regard to the medication.  Pharmacy education was provided today.  The issue of drug interactions and mavacamten was described today.  Patient was informed to contact our office and speak to our pharmacist if they were to obtain ED new OTC or prescription medications.  They have also been advised to contact the office if any of their current prescription medications has a dose change.    -Recommended 1st degree relative surveillance  -No indication for ICD.  -metoprolol  -Consent to provide HPI to [facility] was provided by the patient.  -Start mavacamten 2.5mg  -Enroll in 2D TTE surveillance program  -REMS performed  -2D TTE in 4 weeks and 8 weeks with clinic appointment          ORDERS PLACED TODAY:  No orders of the defined types were placed in this encounter.         MEDS ORDERED  TODAY:    No orders of the defined types were placed in this encounter.       MEDS DISCONTINUED TODAY:    There are no discontinued medications.               No follow-ups on file.          This document has been electronically signed by James Yusuf MD PhD on February 7, 2024 12:55 EST      James Yusuf M.D., Ph.D., [facility] Heart Failure and Pulmonary Hypertension Clinic  System Medical Director for HF and PAH

## 2024-02-07 NOTE — PROGRESS NOTES
[facility]      Patient Name: Santhosh Pope  :1970  Age: 53 y.o.  Sex: male  Referring Provider: Max Husain MD   Primary Cardiologist: Max Husain MD  Encounter Provider: Anca Holguin PharmD      Chief Complaint:   Chief Complaint   Patient presents with    pulmonary hypertension       HPI:  Patient presents to the [facility] for their follow-up visit. 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 He comes today for echo results for Camzyos. This is his 12-wk f/u from initiation. We did stop his Camzyos last month due to his VLVOT being < 20 mmHg. He does state today he has been feeling more fatigue and some shortness of breath. No new medications since I last spoke to his wife.    Baseline Echo (10/5/23):   EF 75.5%; VLVOT 57 mmHg   Camzyos started at 5 mg daily     4-wk f/u echo (23):  EF 61-65%; VLVOT 10 mmHg  Camzyos decreased to 2.5 mg daily    8-wk f/u echo (1/10/24):  EF 67.9%; VLVOT 5 mmHg  Camzyos STOPPED    12-wk f/u echo (24):  EF 71%; VLVOT 12 mmHg      Current Regimen:  CV Meds  Verapamil  mg CR BID  Metoprolol succinate XL 50 mg daily  Atorvastatin 20 mg daily  Aspirin 81 mg daily      Medication Use:  Adherence: Good. Missed 0 doses in the last week. Adherence tools used include: pillbox. 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  Wt Readings from Last 1 Encounters:   02/07/24 94.3 kg (207 lb 12.8 oz)       Lab Review:  Renal Function: CrCl cannot be calculated (Patient's most recent lab result is older than the maximum 30 days allowed.).    Lab Results   Component Value Date    PROBNP 72.7 07/27/2021       Results for orders placed during the hospital encounter of 02/07/24    Adult Transthoracic Echo Limited W/ Cont if Necessary Per Protocol    Interpretation Summary    Limited echocardiogram for mavacamten therapy    Left ventricular systolic function is hyperdynamic.  Calculated left ventricular EF = 70.9%.  Normal diastolic function.    Left ventricular wall thickness is consistent with moderate septal asymmetric hypertrophy.  Findings consistent with hypertrophic cardiomyopathy.  LVOT gradient 12 mmHg.    Normal right ventricular cavity size, wall thickness, systolic function and septal motion noted.        ASSESSMENT/PLAN OF CARE:    HOCM   Here for 12-wk echo results after starting Camzyos. We did stop his dose last month given his VLVOT < 20 mmHg. Today, EF 71%; VLVOT 12 mmHg  Restart Camzyos 2.5 mg daily per dosing guide. REMS completed. F/u echo in 4 weeks. He has not started any new medications    Continue metoprolol succinate 50 mg daily  Continue verapamil 120 mg twice daily         Thank you for allowing me to participate in the care of your patient,         Anca Holguin, PharmD  [facility]  02/07/24  09:47 EDT    "

## 2024-02-07 NOTE — ADDENDUM NOTE
Encounter addended by: Prabha Foy MA on: 2/7/2024 4:10 PM   Actions taken: Charge Capture section accepted

## 2024-02-07 NOTE — PROGRESS NOTES
Heart Failure & Pulmonary Arterial Hypertension Clinic  [facility]  James Yusuf M.D., Ph.D., [facility]       Max Husain MD  [address] [address]    Thank you for asking me to see Santhosh Pope.     History of Present Illness  This is a 53 y.o. male with:    1. PH by abnormal PA AT  2. HOCM      Santhosh Pope is a 53 y.o. male who presents today.  The patient is typically seen by Billy Kim MD.  The patient's cardiologist is Dr. Husain.     The patient returns to the clinic today for HOCM.  He was commenced on mavacamten at 5 mg.  He had to be taken down to 2.5 mg since his LVOT was less than 20 mmHg.  He had a 2D TTE last month which showed LVOT gradients less than 20 mmHg, so mavacamten was discontinued.  He has been off the medication for 4 weeks.  He had a 2D TTE prior to his appointment today.  He returns for results.  He reports slight worsening in exercise intolerance since being off mavacamten.  He remains on verapamil and Toprol XL.      Review of Systems - Review of Systems   Cardiovascular:  Positive for dyspnea on exertion.   Respiratory:  Positive for shortness of breath.    All other systems reviewed and are negative.        All other systems were reviewed and were negative.    Patient Active Problem List   Diagnosis    HOCM (hypertrophic obstructive cardiomyopathy)    Essential hypertension    Hypercholesterolemia    Brain tumor    Cerebral edema    Pulmonary hypertension       family history includes Cancer in his father and mother; Heart disease in his brother and father; Lupus in his mother.     reports that he quit smoking about 18 years ago. His smoking use included cigarettes. He started smoking about 37 years ago. He has a 19.00 pack-year smoking history. He has never used smokeless tobacco. He reports current alcohol use of about 1.0 standard drink of alcohol per week. He reports that he does not use drugs.    No Known  Allergies    Social Determinants of Health     Tobacco Use: Medium Risk (2/7/2024)    Patient History     Smoking Tobacco Use: Former     Smokeless Tobacco Use: Never     Passive Exposure: Not on file   Alcohol Use: Not on file   Financial Resource Strain: Not on file   Food Insecurity: Not on file   Transportation Needs: Not on file   Physical Activity: Not on file   Stress: Not on file   Social Connections: Unknown (10/8/2023)    Family and Community Support     Help with Day-to-Day Activities: Not on file     Lonely or Isolated: Not on file   Interpersonal Safety: Unknown (10/8/2023)    Abuse Screen     Unsafe at Home or Work/School: Not on file     Feels Threatened by Someone?: Not on file     Does Anyone Keep You from Contacting Others or Doint Things Outside the Home?: Not on file     Physical Sign of Abuse Present: Not on file   Depression: Not on file   Housing Stability: Unknown (10/8/2023)    Housing Stability     Current Living Arrangements: Not on file     Potentially Unsafe Housing Conditions: Not on file   Utilities: Not on file   Health Literacy: Unknown (10/8/2023)    Education     Help with school or training?: Not on file     Preferred Language: Not on file   Employment: Unknown (10/8/2023)    Employment     Do you want help finding or keeping work or a job?: Not on file   Disabilities: Unknown (10/8/2023)    Disabilities     Concentrating, Remembering, or Making Decisions Difficulty: Not on file     Doing Errands Independently Difficulty: Not on file        Physical Activity: Not on file          Current Outpatient Medications:     Aspirin 81 MG capsule, aspirin 81 mg capsule  Take 1 capsule every day by oral route., Disp: , Rfl:     atorvastatin (LIPITOR) 20 MG tablet, Take 1 tablet by mouth Every Evening., Disp: 90 tablet, Rfl: 3    butalbital-aspirin-caffeine-codeine (FIORINAL WITH CODEINE) -09-30 MG capsule, Take 1 capsule by mouth Every 4 (Four) Hours As Needed for Headache., Disp: ,  Rfl:     escitalopram (LEXAPRO) 10 MG tablet, Take 1 tablet by mouth Daily., Disp: , Rfl:     levETIRAcetam (KEPPRA) 500 MG tablet, Take 2 tablets by mouth 2 (Two) Times a Day., Disp: , Rfl:     LORazepam (ATIVAN) 0.5 MG tablet, Take 1 tablet by mouth As Needed for Anxiety., Disp: , Rfl:     metoprolol succinate XL (TOPROL-XL) 50 MG 24 hr tablet, Take 1 tablet by mouth Daily., Disp: 90 tablet, Rfl: 3    multivitamin with minerals tablet tablet, Take 1 tablet by mouth Daily., Disp: , Rfl:     pantoprazole (Protonix) 40 MG EC tablet, Take 1 tablet by mouth Daily., Disp: , Rfl:     verapamil SR (CALAN-SR) 120 MG CR tablet, TAKE 1 TABLET TWICE A DAY, Disp: 180 tablet, Rfl: 1    Current Facility-Administered Medications:     nitroglycerin (NITROSTAT) SL tablet 0.4 mg, 0.4 mg, Sublingual, Q5 Min PRN, Max Husain MD    sodium chloride 0.9 % flush 10 mL, 10 mL, Intravenous, PRN, Max Husain MD    Vital Sign Review:     Vitals:    02/07/24 1253   BP: 126/78   Pulse: 60   SpO2: 95%         PP:high  Pulse Ox: normal oxygenation on room air    Body mass index is 25.75 kg/m².      02/07/24  1253   Weight: 94.3 kg (207 lb 12.8 oz)           Physical Exam:    Vitals reviewed.   Constitutional:       General: Not in acute distress.     Appearance: Normal and healthy appearance. Well-developed, well-groomed and not in distress. Not ill-appearing, toxic-appearing or diaphoretic.      Interventions: Not intubated.  Eyes:      Conjunctiva/sclera: Conjunctivae normal.      Pupils: Pupils are equal, round, and reactive to light.   Neck:      Vascular: No hepatojugular reflux, JVD or JVR. JVD normal with 6 cm of water.   Pulmonary:      Effort: Pulmonary effort is normal. No tachypnea, bradypnea, accessory muscle usage, prolonged expiration, respiratory distress or retractions. Not intubated.      Breath sounds: Normal breath sounds and air entry. No stridor, decreased air movement or transmitted upper airway sounds.  "No decreased breath sounds. No wheezing. No rhonchi. No rales.   Cardiovascular:      PMI at left midclavicular line. Normal rate. Regular rhythm. S1 with normal intensity. S2 with normal intensity.       Murmurs: There is a grade 1/6 mid frequency early systolic murmur at the ULSB.      No gallop.  No click. No rub.   Neurological:      General: No focal deficit present.      Mental Status: Alert and oriented to person, place and time.   Psychiatric:         Behavior: Behavior is cooperative.          DATA REVIEWED:       ---------------------------------------------------  TTE/PATRICK:    Results for orders placed during the hospital encounter of 02/07/24    Adult Transthoracic Echo Limited W/ Cont if Necessary Per Protocol    Interpretation Summary    Limited echocardiogram for mavacamten therapy    Left ventricular systolic function is hyperdynamic.  Calculated left ventricular EF = 70.9%.  Normal diastolic function.    Left ventricular wall thickness is consistent with moderate septal asymmetric hypertrophy.  Findings consistent with hypertrophic cardiomyopathy.  LVOT gradient 12 mmHg.    Normal right ventricular cavity size, wall thickness, systolic function and septal motion noted.      My interpretation of the TTE is below.     LVEF is 70% and hyperdynamic.  Moderate septal asymmetric hypertrophy consistent with hypertrophic cardiomyopathy.  LVOT gradient 12 mmHg.        Lab Results   Component Value Date    GLUCOSE 126 (H) 03/16/2022    BUN 13 03/16/2022    CREATININE 0.88 03/16/2022    EGFRIFNONA 75 09/14/2021    BCR 14.8 03/16/2022    K 4.3 03/16/2022    CO2 23.7 03/16/2022    CALCIUM 9.4 03/16/2022    ALBUMIN 4.70 03/16/2022    AST 16 03/16/2022    ALT 14 03/16/2022     Lab Results   Component Value Date    WBC 19.54 (H) 03/16/2022    HGB 15.3 03/16/2022    HCT 44.1 03/16/2022    MCV 85.3 03/16/2022     03/16/2022     No results found for: \"CHOL\", \"CHLPL\", \"TRIG\", \"HDL\", \"LDL\", \"LDLDIRECT\"  Lab " "Results   Component Value Date    TSH 0.668 03/16/2022     Lab Results   Component Value Date    CKTOTAL 96 03/16/2022    TROPONINT <0.010 03/16/2022     No results found for: \"HGBA1C\"  No results found for: \"DDIMER\"  Lab Results   Component Value Date    ALT 14 03/16/2022     No results found for: \"HGBA1C\"  Lab Results   Component Value Date    CREATININE 0.88 03/16/2022     No results found for: \"IRON\", \"TIBC\", \"FERRITIN\"  Lab Results   Component Value Date    INR 1.01 10/17/2022    INR 1.1 04/12/2022    INR 1.0 03/16/2022    PROTIME 11.7 10/17/2022    PROTIME 11.9 (H) 04/12/2022    PROTIME 10.9 03/16/2022       PAH RISK ASSESSMENT:  ReDs Vest    Performed by: James Yusuf MD PhD  Authorized by: James Yusuf MD PhD    Method of arrival:  Ambulatory  Weighing self daily:  No  Monitoring heart failure zones:  Yes  Today's HF zone:  Green  Taking medications as prescribed:  Yes  Edema:  No  Shortness of air:  Yes  ReDS value:  30        Assessment & Plan      1. Pulmonary hypertension    2. HOCM (hypertrophic obstructive cardiomyopathy) [I42.1]        1.  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  This is likely from worsening LVOT gradients and MR from  HOCM.  He did have a reduction in his LVOT gradient with subsequent reduction in his pulmonary pressures.    - Obtain PFTs  - No current indication for PAH-specific medications     2.    NYHA Stage C FC-Class II: Slight limitation of physical activity. Comfortable at rest Ordinary physical activity results in fatigue, palpitation, dyspnea (shortness of breath)   LV EF: Patient's last echocardiogram was on February 7, 2024 and revealed LVEF greater than 70%.  LVH: moderate.    Peak LVOT Gradient: 12 mmHg Holter showed minimal in degree PVCs.        Today, I looked at the patient's 2D TTE images with the patient and explained their condition.  We discussed the typical workup and treatment of HOCM.  We discussed SRT options, including ASA. The patient " does not want a referral to discuss SRT.  Medication management was also discussed with the patient.    Mavacamten therapy was described.  Details of the clinical trial were described including potential side effects of reduction in LVEF.  The REMS program was described to the patient in detail including the need for repeated 2D TTE's.  Specialty pharmacy was explained to the patient.  The patient was made aware that echocardiograms have to be performed as scheduled, or the medication will not be refilled.  This office follows FDA guidelines in regard to the medication.  Pharmacy education was provided today.  The issue of drug interactions and mavacamten was described today.  Patient was informed to contact our office and speak to our pharmacist if they were to obtain ED new OTC or prescription medications.  They have also been advised to contact the office if any of their current prescription medications has a dose change.    -Recommended 1st degree relative surveillance  -No indication for ICD.  -metoprolol  -Consent to provide HPI to [facility] was provided by the patient.  -Start mavacamten 2.5mg  -Enroll in 2D TTE surveillance program  -REMS performed  -2D TTE in 4 weeks and 8 weeks with clinic appointment          ORDERS PLACED TODAY:  No orders of the defined types were placed in this encounter.         MEDS ORDERED TODAY:    No orders of the defined types were placed in this encounter.       MEDS DISCONTINUED TODAY:    There are no discontinued medications.               No follow-ups on file.          This document has been electronically signed by James Yusuf MD PhD on February 7, 2024 12:55 EST      James Yusuf M.D., Ph.D., [facility]  System Medical Director for HF and PAH

## 2024-02-13 RX ORDER — METOPROLOL SUCCINATE 50 MG/1
50 TABLET, EXTENDED RELEASE ORAL DAILY
Qty: 90 TABLET | Refills: 3 | Status: SHIPPED | OUTPATIENT
Start: 2024-02-13 | End: 2024-02-14 | Stop reason: SDUPTHER

## 2024-02-14 RX ORDER — METOPROLOL SUCCINATE 50 MG/1
50 TABLET, EXTENDED RELEASE ORAL DAILY
Qty: 90 TABLET | Refills: 3 | Status: SHIPPED | OUTPATIENT
Start: 2024-02-14

## 2024-02-21 ENCOUNTER — OFFICE VISIT (OUTPATIENT)
Dept: CARDIOLOGY | Facility: CLINIC | Age: 54
End: 2024-02-21
Payer: COMMERCIAL

## 2024-02-21 VITALS
DIASTOLIC BLOOD PRESSURE: 70 MMHG | HEIGHT: 75 IN | BODY MASS INDEX: 25.31 KG/M2 | HEART RATE: 61 BPM | WEIGHT: 203.6 LBS | SYSTOLIC BLOOD PRESSURE: 118 MMHG

## 2024-02-21 DIAGNOSIS — I27.20 PULMONARY HYPERTENSION: ICD-10-CM

## 2024-02-21 DIAGNOSIS — I10 ESSENTIAL HYPERTENSION: ICD-10-CM

## 2024-02-21 DIAGNOSIS — E78.00 HYPERCHOLESTEROLEMIA: ICD-10-CM

## 2024-02-21 DIAGNOSIS — I42.1 HOCM (HYPERTROPHIC OBSTRUCTIVE CARDIOMYOPATHY): Primary | ICD-10-CM

## 2024-02-21 PROCEDURE — 99214 OFFICE O/P EST MOD 30 MIN: CPT | Performed by: PHYSICIAN ASSISTANT

## 2024-02-21 PROCEDURE — 93000 ELECTROCARDIOGRAM COMPLETE: CPT | Performed by: PHYSICIAN ASSISTANT

## 2024-02-21 NOTE — PROGRESS NOTES
CARDIOLOGY    Date of Office Visit: 2024  Patient Name: Santhosh Pope  : 1970  Encounter Provider: Ronan Stone PA-C  Primary Cardiologist: Max Husain MD    CHIEF COMPLAINT / REASON FOR OFFICE VISIT     6 month follow up  HOCM    HISTORY OF PRESENT ILLNESS     This is a 53 y.o. year old male who presents to [facility] CARDIOLOGY for a for a 6 month follow up.     He was initially diagnosed with hypertrophic cardiomyopathy in 2021.  He has not been able to tolerate beta-blockade and was started on verapamil.  At that time Valsalva pressures were at 71 mmHg.  Initiation of Camzyos was deferred so he could wean off of Aptiom due to his history of seizures from the meningioma.    At his last visit in 2023, he was having increasing fatigue, lightheadedness and shortness of breath.  A new systolic ejection murmur was heard and on exam and due to his history of hypertrophic cardiomyopathy there is concerning for worsening LVOT obstruction.  A repeat echocardiogram was completed showing increasing LVOT gradients to 57 mmHg with Valsalva and he was referred to Dr. Yusuf for further evaluation.    He was weaned off of Aptiom and started on Camzyos 5 mg daily on 2023.  Subsequent echo 2023 showed LVOT gradient < 10 mmHg with EF of 61-65%.  As the gradient drop below 20, his dose of Camzyos was decreased to 2.5 mg daily.  Repeat echo 1/10/2024 showed a gradient of  5 mmHg and he was taken off of camzyos for 4 weeks. After this, his shortness of breath started to become more apparent. He was placed back on Camzyos 2.5 mg after his last office visit with Dr. Yusuf 2024 and has an echo on 3/6/2024.     Today the patient reports he has been doing well overall. His wife accompanies him for today's visit. He notes he did feel better on the 5 mg dose of Camzyos but overall is feeling less short of breath and he is tolerating more physical  "activity. He denies any increasing LE edema, nausea, dizziness, near syncope or palpations.     PMHx: HOCM, HTN, HL, left bundle branch block, left sigmoid wing meningioma with prior surgery August 2022 with radiation completed November 2022 following with [facility] neurosurgery, previous provoked PE following review of surgery    PHYSICAL EXAMINATION     Vital Signs:  /70 (BP Location: Left arm)   Pulse 61   Ht 190.5 cm (75\")   Wt 92.4 kg (203 lb 9.6 oz)   BMI 25.45 kg/m²   Estimated body mass index is 25.45 kg/m² as calculated from the following:    Height as of this encounter: 190.5 cm (75\").    Weight as of this encounter: 92.4 kg (203 lb 9.6 oz).             Physical Exam  Constitutional:       Appearance: Normal appearance.   HENT:      Head: Normocephalic and atraumatic.   Cardiovascular:      Rate and Rhythm: Normal rate and regular rhythm.      Pulses: Normal pulses.      Heart sounds: Murmur heard.      Systolic murmur is present with a grade of 3/6.   Pulmonary:      Effort: Pulmonary effort is normal.      Breath sounds: Normal breath sounds.   Musculoskeletal:      Right lower leg: No edema.      Left lower leg: No edema.   Skin:     General: Skin is warm and dry.   Neurological:      General: No focal deficit present.      Mental Status: He is alert and oriented to person, place, and time.          Cardiac Testing/Results     Cardiac Testing:   - Echo 10/5/2023: Hyperdynamic EF at 75.5% with mild LVH.  LVOT 24 mmHg with at rest and 57 mmHg with Valsalva.  Hyperkinetic wall segments noted.  Indeterminate diastolic function.  RVSP 21 mmHg    - Echocardiogram 1/10/2024: EF is 67.9% with normal diastolic function.  Moderate asymmetric septal hypertrophy.  LVOT gradient of 5 mmHg.    -Echo 2/7/2024: EF at 70.9% with LVOT gradient of 12 mmHg.      Result Review :  The following data was reviewed by: Ronan Stone PA-C on 02/21/2024:       Lab Results   Component Value Date     03/16/2022 "     (H) 09/14/2021    K 4.3 03/16/2022    K 4.0 09/14/2021     03/16/2022     (H) 09/14/2021    CO2 23.7 03/16/2022    CO2 23.7 09/14/2021    BUN 13 03/16/2022    BUN 13 09/14/2021    CREATININE 0.88 03/16/2022    CREATININE 0.70 03/16/2022    EGFRIFNONA 75 09/14/2021    EGFRIFNONA 65 07/27/2021    GLUCOSE 126 (H) 03/16/2022    GLUCOSE 103 (H) 09/14/2021    CALCIUM 9.4 03/16/2022    CALCIUM 9.0 09/14/2021    ALBUMIN 4.70 03/16/2022    ALBUMIN 4.50 03/16/2021    AST 16 03/16/2022    AST 20 03/16/2021    ALT 14 03/16/2022    ALT 25 03/16/2021     Lab Results   Component Value Date    WBC 19.54 (H) 03/16/2022    WBC 9.37 03/16/2021    HGB 15.3 03/16/2022    HGB 15.5 03/16/2022    HCT 44.1 03/16/2022    HCT 39.8 03/16/2021    MCV 85.3 03/16/2022    MCV 85.6 03/16/2021     03/16/2022     03/16/2021     Lab Results   Component Value Date    PROBNP 72.7 07/27/2021    PROBNP 99.4 03/16/2021     Lab Results   Component Value Date    CKTOTAL 96 03/16/2022    TROPONINT <0.010 03/16/2022     Lab Results   Component Value Date    TSH 0.668 03/16/2022                 ECG 12 Lead    Date/Time: 2/21/2024 1:29 PM  Performed by: Ronan Taylor PA-C    Authorized by: Ronan Taylor PA-C  Comparison: compared with previous ECG from 3/7/2023  Rhythm: sinus rhythm  Rate: normal  BPM: 61  Conduction: left bundle branch block  Q waves: III and aVF    T flattening: III, II, aVF, V6, V5, V4 and V3  QRS axis: normal    Clinical impression: non-specific ECG  Comments: QTc 435                ASSESSMENT & PLAN       Diagnoses and all orders for this visit:    1. HOCM (hypertrophic obstructive cardiomyopathy) (Primary)  -     ECG 12 Lead  Started on Camzyos 2.5 mg, next echo on 3/6/2024. Doing better, improving exercise tolerance.  Most recent EF 70% with LVOT 12 mmHg, next echo in 2 weeks.   Family members have been screened  Continue metoprolol 50 mg daily  Decreased verapamil to 120 mg daily,  keep BP log  Following with HF clinic  ICD was not indicated at last visit, no palpitations or dizziness  2. Pulmonary hypertension  Group 2 2/2 HOCM  Breathing and exercise tolerance improving.   Continue CCB and BB  Euvolemic on exam today  3. Essential hypertension  Keep BP log at home, well controlled today, I will follow up with him in 2 weeks for BP update  4. Hypercholesterolemia  Continue atorvastatin 20 mg daily.       Follow Up:  Return in about 6 months (around 8/21/2024) for Dr. Husain-Flavia.  Patient was given instructions and counseling regarding his condition or for health maintenance advice. Please contact office if worsening symptoms or proceed to ER when appropriate.      Ronan Stone PA-C  02/21/24  13:30 EST    MEDICATIONS         Discharge Medications            Accurate as of February 21, 2024  1:30 PM. If you have any questions, ask your nurse or doctor.                Continue These Medications        Instructions Start Date   Aspirin 81 MG capsule   aspirin 81 mg capsule   Take 1 capsule every day by oral route.      atorvastatin 20 MG tablet  Commonly known as: LIPITOR   20 mg, Oral, Every Evening      butalbital-aspirin-caffeine-codeine -37-30 MG capsule  Commonly known as: FIORINAL WITH CODEINE   1 capsule, Oral, Every 4 Hours PRN      escitalopram 10 MG tablet  Commonly known as: LEXAPRO   1 tablet, Oral, Daily      levETIRAcetam 500 MG tablet  Commonly known as: KEPPRA   1,000 mg, Oral, 2 Times Daily      LORazepam 0.5 MG tablet  Commonly known as: ATIVAN   0.5 mg, Oral, As Needed      Mavacamten 2.5 MG capsule   2.5 mg, Oral, Daily      metoprolol succinate XL 50 MG 24 hr tablet  Commonly known as: TOPROL-XL   50 mg, Oral, Daily      multivitamin with minerals tablet tablet   1 tablet, Oral, Daily      pantoprazole 40 MG EC tablet  Commonly known as: Protonix   40 mg, Oral, Daily      verapamil  MG CR tablet  Commonly known as: CALAN-SR   120 mg, Oral, 2 Times  Daily                   **Estefany Disclaimer: This note was dictated using an electronic transcription. The electronic translation of spoken language may permit erroneous, or at times, nonsensical words or phrases to be inadvertently transcribed. Although I have reviewed the note for such errors, some may still exist.

## 2024-03-06 ENCOUNTER — HOSPITAL ENCOUNTER (OUTPATIENT)
Dept: CARDIOLOGY | Facility: HOSPITAL | Age: 54
Discharge: HOME | End: 2024-03-06
Payer: COMMERCIAL

## 2024-03-06 VITALS
WEIGHT: 203 LBS | DIASTOLIC BLOOD PRESSURE: 62 MMHG | SYSTOLIC BLOOD PRESSURE: 140 MMHG | HEART RATE: 62 BPM | BODY MASS INDEX: 25.24 KG/M2 | HEIGHT: 75 IN

## 2024-03-06 VITALS
WEIGHT: 204.6 LBS | BODY MASS INDEX: 25.44 KG/M2 | HEART RATE: 60 BPM | SYSTOLIC BLOOD PRESSURE: 128 MMHG | DIASTOLIC BLOOD PRESSURE: 80 MMHG | HEIGHT: 75 IN | OXYGEN SATURATION: 98 %

## 2024-03-06 DIAGNOSIS — I42.1 HOCM (HYPERTROPHIC OBSTRUCTIVE CARDIOMYOPATHY): ICD-10-CM

## 2024-03-06 DIAGNOSIS — I27.20 PULMONARY HYPERTENSION: Primary | ICD-10-CM

## 2024-03-06 LAB
BH CV ECHO HCM PROVOKED PEAK GRADIENT: 9 MMHG
BH CV ECHO LEFT VENTRICLE GLOBAL LONGITUDINAL STRAIN: -20.7 %
BH CV ECHO LEFT VENTRICLE MID CAVITARY GRADIENT: 7 MMHG
BH CV ECHO MEAS - EDV(CUBED): 95 ML
BH CV ECHO MEAS - EDV(MOD-SP2): 148 ML
BH CV ECHO MEAS - EDV(MOD-SP4): 171 ML
BH CV ECHO MEAS - EF(MOD-BP): 68.9 %
BH CV ECHO MEAS - EF(MOD-SP2): 70.3 %
BH CV ECHO MEAS - EF(MOD-SP4): 66.1 %
BH CV ECHO MEAS - EF_3D-VOL: 52 %
BH CV ECHO MEAS - ESV(CUBED): 13.6 ML
BH CV ECHO MEAS - ESV(MOD-SP2): 44 ML
BH CV ECHO MEAS - ESV(MOD-SP4): 58 ML
BH CV ECHO MEAS - FS: 47.6 %
BH CV ECHO MEAS - IVS/LVPW: 0.91 CM
BH CV ECHO MEAS - IVSD: 1.15 CM
BH CV ECHO MEAS - LAT PEAK E' VEL: 10.9 CM/SEC
BH CV ECHO MEAS - LV DIASTOLIC VOL/BSA (35-75): 78.7 CM2
BH CV ECHO MEAS - LV MASS(C)D: 202.8 GRAMS
BH CV ECHO MEAS - LV SYSTOLIC VOL/BSA (12-30): 26.7 CM2
BH CV ECHO MEAS - LVIDD: 4.6 CM
BH CV ECHO MEAS - LVIDS: 2.39 CM
BH CV ECHO MEAS - LVPWD: 1.26 CM
BH CV ECHO MEAS - MED PEAK E' VEL: 8.3 CM/SEC
BH CV ECHO MEAS - MV A DUR: 0.17 SEC
BH CV ECHO MEAS - MV A MAX VEL: 72.8 CM/SEC
BH CV ECHO MEAS - MV DEC SLOPE: 481.9 CM/SEC2
BH CV ECHO MEAS - MV DEC TIME: 0.17 SEC
BH CV ECHO MEAS - MV E MAX VEL: 88.3 CM/SEC
BH CV ECHO MEAS - MV E/A: 1.21
BH CV ECHO MEAS - MV MAX PG: 4.3 MMHG
BH CV ECHO MEAS - MV MEAN PG: 1.48 MMHG
BH CV ECHO MEAS - MV P1/2T: 63.5 MSEC
BH CV ECHO MEAS - MV V2 VTI: 32 CM
BH CV ECHO MEAS - MVA(P1/2T): 3.5 CM2
BH CV ECHO MEAS - PULM A REVS DUR: 0.2 SEC
BH CV ECHO MEAS - PULM A REVS VEL: 30.8 CM/SEC
BH CV ECHO MEAS - PULM DIAS VEL: 29.1 CM/SEC
BH CV ECHO MEAS - PULM S/D: 1.32
BH CV ECHO MEAS - PULM SYS VEL: 38.6 CM/SEC
BH CV ECHO MEAS - RAP SYSTOLE: 3 MMHG
BH CV ECHO MEAS - SI(MOD-SP2): 47.9 ML/M2
BH CV ECHO MEAS - SI(MOD-SP4): 52 ML/M2
BH CV ECHO MEAS - SV(MOD-SP2): 104 ML
BH CV ECHO MEAS - SV(MOD-SP4): 113 ML
BH CV ECHO MEASUREMENTS AVERAGE E/E' RATIO: 9.2
LEFT ATRIUM VOLUME INDEX: 32.3 ML/M2

## 2024-03-06 PROCEDURE — 93308 TTE F-UP OR LMTD: CPT

## 2024-03-06 PROCEDURE — 93356 MYOCRD STRAIN IMG SPCKL TRCK: CPT

## 2024-03-06 PROCEDURE — 93325 DOPPLER ECHO COLOR FLOW MAPG: CPT

## 2024-03-06 PROCEDURE — 25510000001 PERFLUTREN (DEFINITY) 8.476 MG IN SODIUM CHLORIDE (PF) 0.9 % 10 ML INJECTION: Performed by: INTERNAL MEDICINE

## 2024-03-06 PROCEDURE — 93321 DOPPLER ECHO F-UP/LMTD STD: CPT

## 2024-03-06 PROCEDURE — G0463 HOSPITAL OUTPT CLINIC VISIT: HCPCS

## 2024-03-06 RX ORDER — MAVACAMTEN 2.5 MG/1
CAPSULE, GELATIN COATED ORAL
Qty: 30 CAPSULE | Refills: 0 | OUTPATIENT
Start: 2024-03-06

## 2024-03-06 RX ADMIN — PERFLUTREN 2 ML: 6.52 INJECTION, SUSPENSION INTRAVENOUS at 12:54

## 2024-03-06 NOTE — PROGRESS NOTES
Heart Failure & Pulmonary Arterial Hypertension Clinic  [facility]  James Yusuf M.D., Ph.D., [facility]       James Yusuf MD PhD  [address] [address]    Thank you for asking me to see Santhosh Pope.     History of Present Illness  This is a 53 y.o. male with:    1. PH by abnormal PA AT  2. HOCM      Santhosh Pope is a 53 y.o. male who presents today.  The patient is typically seen by Billy Kim MD.  The patient's cardiologist is Dr. Husain.     The patient returns to the clinic today for HOCM and PAH.  He was commenced originally on mavacamten at 5 mg daily, but this had to be reduced to 2.5 mg because his LVOT was less than 20 mmHg.  He then had a 2D TTE after 4 weeks on 2.5 mg which continued to show a persistent reduction in his LVOT less than 20 mmHg, so mavacamten was discontinued.  He was off mavacamten for 4 weeks.  He then had a 2D TTE which showed that it was acceptable to commence mavacamten at 2.5 mg daily.  He has now been on 2.5 mg daily for 1 month.  He had a 2D TTE for which he returns today for results.  Otherwise, he remains on verapamil and Toprol-XL.  He reports stable exercise intolerance.  Medication compliant.  Denies adverse effects.  No orthopnea, PND, lower extremity edema, ascites, early abdominal satiety.    Review of Systems - Review of Systems   Cardiovascular:  Positive for dyspnea on exertion.   Respiratory:  Positive for shortness of breath.    All other systems reviewed and are negative.      All other systems were reviewed and were negative.    Patient Active Problem List   Diagnosis    HOCM (hypertrophic obstructive cardiomyopathy)    Essential hypertension    Hypercholesterolemia    Brain tumor    Cerebral edema    Pulmonary hypertension       family history includes Cancer in his father and mother; Heart disease in his brother and father; Lupus in his mother.     reports that he quit smoking about 18 years  ago. His smoking use included cigarettes. He started smoking about 37 years ago. He has a 19 pack-year smoking history. He has never used smokeless tobacco. He reports current alcohol use of about 1.0 standard drink of alcohol per week. He reports that he does not use drugs.    No Known Allergies    Social Determinants of Health     Tobacco Use: Medium Risk (2/21/2024)    Patient History     Smoking Tobacco Use: Former     Smokeless Tobacco Use: Never     Passive Exposure: Not on file   Alcohol Use: Not on file   Financial Resource Strain: Not on file   Food Insecurity: Not on file   Transportation Needs: Not on file   Physical Activity: Not on file   Stress: Not on file   Social Connections: Unknown (10/8/2023)    Family and Community Support     Help with Day-to-Day Activities: Not on file     Lonely or Isolated: Not on file   Interpersonal Safety: Unknown (10/8/2023)    Abuse Screen     Unsafe at Home or Work/School: Not on file     Feels Threatened by Someone?: Not on file     Does Anyone Keep You from Contacting Others or Doint Things Outside the Home?: Not on file     Physical Sign of Abuse Present: Not on file   Depression: Not on file   Housing Stability: Unknown (10/8/2023)    Housing Stability     Current Living Arrangements: Not on file     Potentially Unsafe Housing Conditions: Not on file   Utilities: Not on file   Health Literacy: Unknown (10/8/2023)    Education     Help with school or training?: Not on file     Preferred Language: Not on file   Employment: Unknown (10/8/2023)    Employment     Do you want help finding or keeping work or a job?: Not on file   Disabilities: Unknown (10/8/2023)    Disabilities     Concentrating, Remembering, or Making Decisions Difficulty: Not on file     Doing Errands Independently Difficulty: Not on file        Physical Activity: Not on file          Current Outpatient Medications:     Aspirin 81 MG capsule, aspirin 81 mg capsule  Take 1 capsule every day by oral  route., Disp: , Rfl:     atorvastatin (LIPITOR) 20 MG tablet, Take 1 tablet by mouth Every Evening., Disp: 90 tablet, Rfl: 3    butalbital-aspirin-caffeine-codeine (FIORINAL WITH CODEINE) -15-30 MG capsule, Take 1 capsule by mouth Every 4 (Four) Hours As Needed for Headache., Disp: , Rfl:     escitalopram (LEXAPRO) 10 MG tablet, Take 1 tablet by mouth Daily., Disp: , Rfl:     levETIRAcetam (KEPPRA) 500 MG tablet, Take 2 tablets by mouth 2 (Two) Times a Day., Disp: , Rfl:     LORazepam (ATIVAN) 0.5 MG tablet, Take 1 tablet by mouth As Needed for Anxiety., Disp: , Rfl:     Mavacamten 2.5 MG capsule, Take 2.5 mg by mouth Daily., Disp: 30 capsule, Rfl: 0    metoprolol succinate XL (TOPROL-XL) 50 MG 24 hr tablet, Take 1 tablet by mouth Daily., Disp: 90 tablet, Rfl: 3    multivitamin with minerals tablet tablet, Take 1 tablet by mouth Daily., Disp: , Rfl:     pantoprazole (Protonix) 40 MG EC tablet, Take 1 tablet by mouth Daily., Disp: , Rfl:     verapamil SR (CALAN-SR) 120 MG CR tablet, Take 1 tablet by mouth Daily., Disp: 90 tablet, Rfl: 1    Current Facility-Administered Medications:     nitroglycerin (NITROSTAT) SL tablet 0.4 mg, 0.4 mg, Sublingual, Q5 Min PRN, Max Husain MD    sodium chloride 0.9 % flush 10 mL, 10 mL, Intravenous, PRN, Max Husain MD    Vital Sign Review:     Vitals:    03/06/24 1257   BP: 128/80   Pulse: 60   SpO2: 98%       PP:Low  Pulse Ox: normal oxygenation on room air    Body mass index is 25.57 kg/m².      03/06/24  1257   Weight: 92.8 kg (204 lb 9.6 oz)         Physical Exam:    Vitals reviewed.   Constitutional:       General: Not in acute distress.     Appearance: Normal and healthy appearance. Well-developed, well-groomed and not in distress. Not ill-appearing, toxic-appearing or diaphoretic.      Interventions: Not intubated.  Eyes:      Conjunctiva/sclera: Conjunctivae normal.      Pupils: Pupils are equal, round, and reactive to light.   Neck:      Vascular:  No hepatojugular reflux, JVD or JVR. JVD normal with 6 cm of water.   Pulmonary:      Effort: Pulmonary effort is normal. No tachypnea, bradypnea, accessory muscle usage, prolonged expiration, respiratory distress or retractions. Not intubated.      Breath sounds: Normal breath sounds and air entry. No stridor, decreased air movement or transmitted upper airway sounds. No decreased breath sounds. No wheezing. No rhonchi. No rales.   Cardiovascular:      PMI at left midclavicular line. Normal rate. Regular rhythm. S1 with normal intensity. S2 with normal intensity.       Murmurs: There is a grade 1/6 mid frequency early systolic murmur at the ULSB.      No gallop.  No click. No rub.   Neurological:      General: No focal deficit present.      Mental Status: Alert and oriented to person, place and time.   Psychiatric:         Behavior: Behavior is cooperative.            DATA REVIEWED:       ---------------------------------------------------  TTE/PATRICK:    Results for orders placed during the hospital encounter of 03/06/24    Adult Transthoracic Echo Limited W/ Cont if Necessary Per Protocol    Interpretation Summary    Limited echocardiogram for mavacamten    Left ventricular systolic function is normal. Calculated left ventricular EF = 68.9%.  Moderate asymmetric septal hypertrophy.  Normal diastolic function.    The findings are consistent with hypertrophic cardiomyopathy.  Intracavitary gradient of 7 mmHg.    Normal right ventricular cavity size, wall thickness, systolic function and septal motion noted.      My interpretation of the TTE is below.     LVEF is 68%.  Moderate asymmetric septal hypertrophy.  Hypertrophic cardiomyopathy.  Intracavitary gradient 7 mmHg.        Lab Results   Component Value Date    GLUCOSE 126 (H) 03/16/2022    BUN 13 03/16/2022    CREATININE 0.88 03/16/2022    EGFRIFNONA 75 09/14/2021    BCR 14.8 03/16/2022    K 4.3 03/16/2022    CO2 23.7 03/16/2022    CALCIUM 9.4 03/16/2022    ALBUMIN  "4.70 03/16/2022    AST 16 03/16/2022    ALT 14 03/16/2022     Lab Results   Component Value Date    WBC 19.54 (H) 03/16/2022    HGB 15.3 03/16/2022    HCT 44.1 03/16/2022    MCV 85.3 03/16/2022     03/16/2022     No results found for: \"CHOL\", \"CHLPL\", \"TRIG\", \"HDL\", \"LDL\", \"LDLDIRECT\"  Lab Results   Component Value Date    TSH 0.668 03/16/2022     Lab Results   Component Value Date    CKTOTAL 96 03/16/2022    TROPONINT <0.010 03/16/2022     No results found for: \"HGBA1C\"  No results found for: \"DDIMER\"  Lab Results   Component Value Date    ALT 14 03/16/2022     No results found for: \"HGBA1C\"  Lab Results   Component Value Date    CREATININE 0.88 03/16/2022     No results found for: \"IRON\", \"TIBC\", \"FERRITIN\"  Lab Results   Component Value Date    INR 1.01 10/17/2022    INR 1.1 04/12/2022    INR 1.0 03/16/2022    PROTIME 11.7 10/17/2022    PROTIME 11.9 (H) 04/12/2022    PROTIME 10.9 03/16/2022       PAH RISK ASSESSMENT:          Assessment & Plan      1. Pulmonary hypertension    2. HOCM (hypertrophic obstructive cardiomyopathy)      1.  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  This is likely from worsening LVOT gradients from MR and HOCM.  He did have an initial reduction in his LVOT gradient with subsequent reduction in his pulmonary pressures.  - No current indications for PAH-specific medicines      2.    NYHA Stage C FC-Class II: Slight limitation of physical activity. Comfortable at rest Ordinary physical activity results in fatigue, palpitation, dyspnea (shortness of breath)   LV EF: Acceptable LVH: moderate.    Peak LVOT Gradient: 7 mmHg Holter showed minimal in degree PVCs.      Today, I looked at the patient's 2D TTE images with the patient and explained their condition.  The patient's LVEF is acceptable to continue mavacamten therapy.      -First-degree relative surveillance has been recommended  - No current indication for ICD  - Metoprolol and verapamil  - Continue mavacamten 2.5 mg  -REMS " performed  - 2D TTE in 8 weeks with follow-up.  He does tell me that he is going to be out of town and is not going to be able to get an echo in 8 weeks.  He will be back on May 22.  So, we will try to schedule his echo around that time.        ORDERS PLACED TODAY:  No orders of the defined types were placed in this encounter.         MEDS ORDERED TODAY:    No orders of the defined types were placed in this encounter.       MEDS DISCONTINUED TODAY:    There are no discontinued medications.               Return for Recheck 2DTTE in 8 weeks.          This document has been electronically signed by James Yusuf MD PhD on March 6, 2024 13:21 EST      James Yusuf M.D., Ph.D., [facility] Heart Failure and Pulmonary Hypertension Clinic  System Medical Director for HF and PAH

## 2024-03-06 NOTE — ADDENDUM NOTE
Encounter addended by: Prabha Foy MA on: 3/6/2024 2:42 PM   Actions taken: Charge Capture section accepted

## 2024-03-06 NOTE — LETTER
March 6, 2024       No Recipients    Patient: Santhosh Pope   YOB: 1970   Date of Visit: 3/6/2024     Dear Billy Kim MD:       Thank you for referring Santhosh Pope to me for evaluation. Below are the relevant portions of my assessment and plan of care.    If you have questions, please do not hesitate to call me. I look forward to following Santhosh along with you.         Sincerely,        James Yusuf MD PhD        CC:   No Recipients    James Yusuf MD PhD  03/06/24 1321  Signed  Heart Failure & Pulmonary Arterial Hypertension Clinic  [facility]  James Yusuf M.D., Ph.D., [facility]       James Yusuf MD PhD  [address] [place] [address]    Thank you for asking me to see Santhosh Pope.     History of Present Illness  This is a 53 y.o. male with:    1. PH by abnormal PA AT  2. HOCM      Santhosh Pope is a 53 y.o. male who presents today.  The patient is typically seen by Billy Kim MD.  The patient's cardiologist is Dr. Husain.     The patient returns to the clinic today for HOCM and PAH.  He was commenced originally on mavacamten at 5 mg daily, but this had to be reduced to 2.5 mg because his LVOT was less than 20 mmHg.  He then had a 2D TTE after 4 weeks on 2.5 mg which continued to show a persistent reduction in his LVOT less than 20 mmHg, so mavacamten was discontinued.  He was off mavacamten for 4 weeks.  He then had a 2D TTE which showed that it was acceptable to commence mavacamten at 2.5 mg daily.  He has now been on 2.5 mg daily for 1 month.  He had a 2D TTE for which he returns today for results.  Otherwise, he remains on verapamil and Toprol-XL.  He reports stable exercise intolerance.  Medication compliant.  Denies adverse effects.  No orthopnea, PND, lower extremity edema, ascites, early abdominal satiety.    Review of Systems - Review of Systems   Cardiovascular:  Positive for dyspnea on exertion.    Respiratory:  Positive for shortness of breath.    All other systems reviewed and are negative.      All other systems were reviewed and were negative.    Patient Active Problem List   Diagnosis   • HOCM (hypertrophic obstructive cardiomyopathy)   • Essential hypertension   • Hypercholesterolemia   • Brain tumor   • Cerebral edema   • Pulmonary hypertension       family history includes Cancer in his father and mother; Heart disease in his brother and father; Lupus in his mother.     reports that he quit smoking about 18 years ago. His smoking use included cigarettes. He started smoking about 37 years ago. He has a 19 pack-year smoking history. He has never used smokeless tobacco. He reports current alcohol use of about 1.0 standard drink of alcohol per week. He reports that he does not use drugs.    No Known Allergies    Social Determinants of Health     Tobacco Use: Medium Risk (2/21/2024)    Patient History    • Smoking Tobacco Use: Former    • Smokeless Tobacco Use: Never    • Passive Exposure: Not on file   Alcohol Use: Not on file   Financial Resource Strain: Not on file   Food Insecurity: Not on file   Transportation Needs: Not on file   Physical Activity: Not on file   Stress: Not on file   Social Connections: Unknown (10/8/2023)    Family and Community Support    • Help with Day-to-Day Activities: Not on file    • Lonely or Isolated: Not on file   Interpersonal Safety: Unknown (10/8/2023)    Abuse Screen    • Unsafe at Home or Work/School: Not on file    • Feels Threatened by Someone?: Not on file    • Does Anyone Keep You from Contacting Others or Doint Things Outside the Home?: Not on file    • Physical Sign of Abuse Present: Not on file   Depression: Not on file   Housing Stability: Unknown (10/8/2023)    Housing Stability    • Current Living Arrangements: Not on file    • Potentially Unsafe Housing Conditions: Not on file   Utilities: Not on file   Health Literacy: Unknown (10/8/2023)    Education     • Help with school or training?: Not on file    • Preferred Language: Not on file   Employment: Unknown (10/8/2023)    Employment    • Do you want help finding or keeping work or a job?: Not on file   Disabilities: Unknown (10/8/2023)    Disabilities    • Concentrating, Remembering, or Making Decisions Difficulty: Not on file    • Doing Errands Independently Difficulty: Not on file        Physical Activity: Not on file          Current Outpatient Medications:   •  Aspirin 81 MG capsule, aspirin 81 mg capsule  Take 1 capsule every day by oral route., Disp: , Rfl:   •  atorvastatin (LIPITOR) 20 MG tablet, Take 1 tablet by mouth Every Evening., Disp: 90 tablet, Rfl: 3  •  butalbital-aspirin-caffeine-codeine (FIORINAL WITH CODEINE) -88-30 MG capsule, Take 1 capsule by mouth Every 4 (Four) Hours As Needed for Headache., Disp: , Rfl:   •  escitalopram (LEXAPRO) 10 MG tablet, Take 1 tablet by mouth Daily., Disp: , Rfl:   •  levETIRAcetam (KEPPRA) 500 MG tablet, Take 2 tablets by mouth 2 (Two) Times a Day., Disp: , Rfl:   •  LORazepam (ATIVAN) 0.5 MG tablet, Take 1 tablet by mouth As Needed for Anxiety., Disp: , Rfl:   •  Mavacamten 2.5 MG capsule, Take 2.5 mg by mouth Daily., Disp: 30 capsule, Rfl: 0  •  metoprolol succinate XL (TOPROL-XL) 50 MG 24 hr tablet, Take 1 tablet by mouth Daily., Disp: 90 tablet, Rfl: 3  •  multivitamin with minerals tablet tablet, Take 1 tablet by mouth Daily., Disp: , Rfl:   •  pantoprazole (Protonix) 40 MG EC tablet, Take 1 tablet by mouth Daily., Disp: , Rfl:   •  verapamil SR (CALAN-SR) 120 MG CR tablet, Take 1 tablet by mouth Daily., Disp: 90 tablet, Rfl: 1    Current Facility-Administered Medications:   •  nitroglycerin (NITROSTAT) SL tablet 0.4 mg, 0.4 mg, Sublingual, Q5 Min PRN, Max Husain MD  •  sodium chloride 0.9 % flush 10 mL, 10 mL, Intravenous, PRN, Max Husain MD    Vital Sign Review:     Vitals:    03/06/24 1257   BP: 128/80   Pulse: 60   SpO2: 98%        PP:Low  Pulse Ox: normal oxygenation on room air    Body mass index is 25.57 kg/m².      03/06/24  1257   Weight: 92.8 kg (204 lb 9.6 oz)         Physical Exam:    Vitals reviewed.   Constitutional:       General: Not in acute distress.     Appearance: Normal and healthy appearance. Well-developed, well-groomed and not in distress. Not ill-appearing, toxic-appearing or diaphoretic.      Interventions: Not intubated.  Eyes:      Conjunctiva/sclera: Conjunctivae normal.      Pupils: Pupils are equal, round, and reactive to light.   Neck:      Vascular: No hepatojugular reflux, JVD or JVR. JVD normal with 6 cm of water.   Pulmonary:      Effort: Pulmonary effort is normal. No tachypnea, bradypnea, accessory muscle usage, prolonged expiration, respiratory distress or retractions. Not intubated.      Breath sounds: Normal breath sounds and air entry. No stridor, decreased air movement or transmitted upper airway sounds. No decreased breath sounds. No wheezing. No rhonchi. No rales.   Cardiovascular:      PMI at left midclavicular line. Normal rate. Regular rhythm. S1 with normal intensity. S2 with normal intensity.       Murmurs: There is a grade 1/6 mid frequency early systolic murmur at the ULSB.      No gallop.  No click. No rub.   Neurological:      General: No focal deficit present.      Mental Status: Alert and oriented to person, place and time.   Psychiatric:         Behavior: Behavior is cooperative.            DATA REVIEWED:       ---------------------------------------------------  TTE/PATRICK:    Results for orders placed during the hospital encounter of 03/06/24    Adult Transthoracic Echo Limited W/ Cont if Necessary Per Protocol    Interpretation Summary  •  Limited echocardiogram for mavacamten  •  Left ventricular systolic function is normal. Calculated left ventricular EF = 68.9%.  Moderate asymmetric septal hypertrophy.  Normal diastolic function.  •  The findings are consistent with hypertrophic  "cardiomyopathy.  Intracavitary gradient of 7 mmHg.  •  Normal right ventricular cavity size, wall thickness, systolic function and septal motion noted.      My interpretation of the TTE is below.     LVEF is 68%.  Moderate asymmetric septal hypertrophy.  Hypertrophic cardiomyopathy.  Intracavitary gradient 7 mmHg.        Lab Results   Component Value Date    GLUCOSE 126 (H) 03/16/2022    BUN 13 03/16/2022    CREATININE 0.88 03/16/2022    EGFRIFNONA 75 09/14/2021    BCR 14.8 03/16/2022    K 4.3 03/16/2022    CO2 23.7 03/16/2022    CALCIUM 9.4 03/16/2022    ALBUMIN 4.70 03/16/2022    AST 16 03/16/2022    ALT 14 03/16/2022     Lab Results   Component Value Date    WBC 19.54 (H) 03/16/2022    HGB 15.3 03/16/2022    HCT 44.1 03/16/2022    MCV 85.3 03/16/2022     03/16/2022     No results found for: \"CHOL\", \"CHLPL\", \"TRIG\", \"HDL\", \"LDL\", \"LDLDIRECT\"  Lab Results   Component Value Date    TSH 0.668 03/16/2022     Lab Results   Component Value Date    CKTOTAL 96 03/16/2022    TROPONINT <0.010 03/16/2022     No results found for: \"HGBA1C\"  No results found for: \"DDIMER\"  Lab Results   Component Value Date    ALT 14 03/16/2022     No results found for: \"HGBA1C\"  Lab Results   Component Value Date    CREATININE 0.88 03/16/2022     No results found for: \"IRON\", \"TIBC\", \"FERRITIN\"  Lab Results   Component Value Date    INR 1.01 10/17/2022    INR 1.1 04/12/2022    INR 1.0 03/16/2022    PROTIME 11.7 10/17/2022    PROTIME 11.9 (H) 04/12/2022    PROTIME 10.9 03/16/2022       PAH RISK ASSESSMENT:          Assessment & Plan     1. Pulmonary hypertension    2. HOCM (hypertrophic obstructive cardiomyopathy)      1.  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  This is likely from worsening LVOT gradients from MR and HOCM.  He did have an initial reduction in his LVOT gradient with subsequent reduction in his pulmonary pressures.  - No current indications for PAH-specific medicines      2.    NYHA Stage C FC-Class II: Slight limitation of " physical activity. Comfortable at rest Ordinary physical activity results in fatigue, palpitation, dyspnea (shortness of breath)   LV EF: Acceptable LVH: moderate.    Peak LVOT Gradient: 7 mmHg Holter showed minimal in degree PVCs.      Today, I looked at the patient's 2D TTE images with the patient and explained their condition.  The patient's LVEF is acceptable to continue mavacamten therapy.      -First-degree relative surveillance has been recommended  - No current indication for ICD  - Metoprolol and verapamil  - Continue mavacamten 2.5 mg  -REMS performed  - 2D TTE in 8 weeks with follow-up.  He does tell me that he is going to be out of town and is not going to be able to get an echo in 8 weeks.  He will be back on May 22.  So, we will try to schedule his echo around that time.        ORDERS PLACED TODAY:  No orders of the defined types were placed in this encounter.         MEDS ORDERED TODAY:    No orders of the defined types were placed in this encounter.       MEDS DISCONTINUED TODAY:    There are no discontinued medications.               Return for Recheck 2DTTE in 8 weeks.          This document has been electronically signed by James Yusuf MD PhD on March 6, 2024 13:21 EST      James Yusuf M.D., Ph.D., [facility] Heart Failure and Pulmonary Hypertension Clinic  System Medical Director for HF and PAH

## 2024-03-06 NOTE — PROGRESS NOTES
[facility] Failure [facility]      Patient Name: Santhosh Pope  :1970  Age: 53 y.o.  Sex: male  Referring Provider: James Yusuf,*   Primary Cardiologist: Max Husain MD  Encounter Provider: Zahraa Garcia [facility]      Chief Complaint:   Chief Complaint   Patient presents with    Cardiomyopathy       HPI:  Patient presents to the [facility] for their follow-up visit. 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 He comes today for echo results for Camzyos. This is his 4-wk f/u after re-starting Camzyos at 2.5 mg daily. His verapamil dose was decreased to 120 mg every evening. He reports no other medication changes. He says he has been feeling less energetic being on the lower dose, but reports no other side effects. Started checking BP at home twice daily on 24 and BP has been ranging from 120s-130s/80s-90s.    Baseline Echo (10/5/23):   EF 75.5%; VLVOT 57 mmHg   Camzyos started at 5 mg daily     4-wk f/u echo (23):  EF 61-65%; VLVOT 10 mmHg  Camzyos decreased to 2.5 mg daily    8-wk f/u echo (1/10/24):  EF 67.9%; VLVOT 5 mmHg  Camzyos STOPPED    12-wk f/u echo (24):  EF 71%; VLVOT 12 mmHg  Camzyos re-started at 2.5 mg daily    4-wk f/u echo (3/6/24) after re-starting:  EF 68.9%; VLVOT 7 mmHg    Current Regimen:  CV Meds  Verapamil  mg CR BID  Metoprolol succinate XL 50 mg daily  Atorvastatin 20 mg daily  Aspirin 81 mg daily      Medication Use:  Adherence: Good. Missed 0 doses in the last week. Adherence tools used include: pillbox. Barriers for adherence include: none  Past hx of medication use/intolerance: none  Affordability: Commercial - [employer]      Social History:  Tobacco use: former smoker, quit in  (1 PPD x 19 years)  EtOH use: occasionally  Illicit drug use: no history of illicit drug use  Exercise: limited physical activity due to health/physical  "limitations- working around the house/outside causes some shortness of breath/fatigue      Immunization Status:  Pneumococcal: none  Influenza: 9/30/20  COVID-19: 6/22/21, 7/20/21 (Moderna)      OBJECTIVE:    /80 (BP Location: Right arm, Patient Position: Sitting)   Pulse 60   Ht 190.5 cm (75\")   Wt 92.8 kg (204 lb 9.6 oz)   SpO2 98%   BMI 25.57 kg/m²     Body mass index is 25.57 kg/m².  Wt Readings from Last 1 Encounters:   03/06/24 92.8 kg (204 lb 9.6 oz)       Lab Review:  Renal Function: CrCl cannot be calculated (Patient's most recent lab result is older than the maximum 30 days allowed.).    Lab Results   Component Value Date    PROBNP 72.7 07/27/2021       Results for orders placed during the hospital encounter of 03/06/24    Adult Transthoracic Echo Limited W/ Cont if Necessary Per Protocol    Interpretation Summary    Limited echocardiogram for mavacamten    Left ventricular systolic function is normal. Calculated left ventricular EF = 68.9%.  Moderate asymmetric septal hypertrophy.  Normal diastolic function.    The findings are consistent with hypertrophic cardiomyopathy.  Intracavitary gradient of 7 mmHg.    Normal right ventricular cavity size, wall thickness, systolic function and septal motion noted.        ASSESSMENT/PLAN OF CARE:    HOCM   Here for 4-wk echo results after re-starting Camzyos at 2.5 mg daily. Today, EF 68.9%; VLVOT 7 mmHg  Continue Camzyos 2.5 mg daily per dosing guide. REMS completed. Patient is due for f/u echo in 8 weeks; however he will be on vacation in [place] until May 22nd, so he is aware he will be out of Camzyos for ~3 weeks. Will schedule f/u for his return.   Continue metoprolol succinate 50 mg daily  Continue verapamil 120 mg once daily in the evening.         Thank you for allowing me to participate in the care of your patient,         Zahraa Garcia, [facility]  03/06/24  09:47 EDT    "

## 2024-04-11 ENCOUNTER — OFFICE VISIT (OUTPATIENT)
Dept: CARDIOLOGY | Facility: CLINIC | Age: 54
End: 2024-04-11
Payer: COMMERCIAL

## 2024-04-11 VITALS
OXYGEN SATURATION: 98 % | WEIGHT: 204.3 LBS | DIASTOLIC BLOOD PRESSURE: 70 MMHG | BODY MASS INDEX: 25.4 KG/M2 | HEIGHT: 75 IN | SYSTOLIC BLOOD PRESSURE: 130 MMHG | HEART RATE: 60 BPM

## 2024-04-11 DIAGNOSIS — E78.00 HYPERCHOLESTEROLEMIA: ICD-10-CM

## 2024-04-11 DIAGNOSIS — I10 ESSENTIAL HYPERTENSION: ICD-10-CM

## 2024-04-11 DIAGNOSIS — I42.1 HOCM (HYPERTROPHIC OBSTRUCTIVE CARDIOMYOPATHY): Primary | ICD-10-CM

## 2024-04-11 PROCEDURE — 99214 OFFICE O/P EST MOD 30 MIN: CPT | Performed by: INTERNAL MEDICINE

## 2024-04-11 NOTE — PROGRESS NOTES
PATIENTINFORMATION    Date of Office Visit: 2024  Encounter Provider: Max Husain MD  Place of Service: [facility] CARDIOLOGY  Patient Name: Santhosh Pope  : 1970    Subjective:     Encounter Date:2024      Patient ID: Santhosh Pope is a 54 y.o. male.    No chief complaint on file.    HPI  Mr. Pope is a pleasant 53 yo gentleman who came to cardiology clinic for fu visit.  He was accompanied by his wife Indira.  He has been on Camzyos since 2023.  He reports significantly improved fatigue at the beginning when he was on 5 mg of Camzyos..  Dose later reduced to 2.5 because of significantly improved LVOT gradient but started having intermittent fatigue but still better than before starting Camzyos.  He has intermittent dizzy spells without known exacerbating relieving factor but denies any fainting, chest pain, orthopnea, PND, palpitations or extremity swelling.  Compliant with current medications.    He admits he does not exercise regularly but plans to start walking regularly.  No ER visit hospitalizations since last clinic visit      ROS  All systems reviewed and negative except as noted in HPI.    Past Medical History:   Diagnosis Date    Abnormal ECG     Allergic     Anxiety     Headache     Heart murmur     HOCM (hypertrophic obstructive cardiomyopathy) 2021    Hyperlipidemia     Hypertension        Past Surgical History:   Procedure Laterality Date    BRAIN SURGERY      When he was 18 y/o    COLONOSCOPY      ESOPHAGOSCOPY / EGD      x 2     EYE SURGERY      x2    LEG SURGERY      STOMACH SURGERY         Social History     Socioeconomic History    Marital status:    Tobacco Use    Smoking status: Former     Current packs/day: 0.00     Average packs/day: 1 pack/day for 19.0 years (19.0 ttl pk-yrs)     Types: Cigarettes     Start date:      Quit date: 2006     Years since quittin.2    Smokeless tobacco: Never    Tobacco comments:      "caffeine use    Vaping Use    Vaping status: Never Used   Substance and Sexual Activity    Alcohol use: Yes     Alcohol/week: 1.0 standard drink of alcohol     Types: 1 Cans of beer per week     Comment: Socially    Drug use: No    Sexual activity: Yes       Family History   Problem Relation Age of Onset    Cancer Mother     Lupus Mother     Heart disease Father     Cancer Father     Heart disease Brother          Procedures       Objective:     /70 (BP Location: Left arm)   Pulse 60   Ht 190.5 cm (75\")   Wt 92.7 kg (204 lb 4.8 oz)   SpO2 98%   BMI 25.54 kg/m²  Body mass index is 25.54 kg/m².     Constitutional:       General: Not in acute distress.     Appearance: Well-developed. Not diaphoretic.   Eyes:      Pupils: Pupils are equal, round, and reactive to light.   HENT:      Head: Normocephalic and atraumatic.   Neck:      Thyroid: No thyromegaly.   Pulmonary:      Effort: Pulmonary effort is normal. No respiratory distress.      Breath sounds: Normal breath sounds. No wheezing. No rales.   Chest:      Chest wall: Not tender to palpatation.   Cardiovascular:      Normal rate. Regular rhythm.      No gallop.    Pulses:     Intact distal pulses.   Edema:     Peripheral edema absent.   Abdominal:      General: Bowel sounds are normal. There is no distension.      Palpations: Abdomen is soft.      Tenderness: There is no guarding.   Musculoskeletal: Normal range of motion.         General: No deformity.      Cervical back: Normal range of motion and neck supple. Skin:     General: Skin is warm and dry.      Findings: No rash.   Neurological:      Mental Status: Alert and oriented to person, place, and time.      Cranial Nerves: No cranial nerve deficit.      Deep Tendon Reflexes: Reflexes are normal and symmetric.   Psychiatric:         Judgment: Judgment normal.         Review Of Data: I have reviewed pertinent recent labs, images and documents and pertinent findings included in HPI or assessment " below.          Assessment/Plan:           HOCM as well as LVH-prior EKGs show LVH with strain pattern.  EKG is showing left bundle branch block.Currently on verapamil and beta-blocker . Normal myocardial perfusion study March 2020. Stress echo done in July 2022 showing resting LVOT gradient of 35 and peak exercise 74 mmHg.  He exercised for 5 minutes without evidence for ischemia.  He was started on Camzyos in October 2023 for significantly elevated LVOT gradient and fatigue.  He has excellent response with initial 5 mg of Camzyos and currently dose reduced to 2.5 mg p.o. daily.  Most recent echo with normal left ventricular ejection fraction and no significant LVOT gradients.  He reports some improvement of fatigue.  Hypertension-controlled  Left sphenoid wing meningioma with recurrence-he had extensive surgery in August 2022 but could not remove all tumor and treatment was followed by radiation and repeat surgery in November 2022.  Surgery was reportedly successful.He is on antiseizure medication.  Follows up with [facility] neurosurgery.  Hypercholesterolemia-on atorvastatin.  Family history of coronary artery disease-he had negative myocardial perfusion study in March 2021  Normal sleep study  His son diagnosed with HOCM per family screening recommendations   LBBB  PE treated with Eliquis  -provoked PE while he was admitted and treated in ICU following neurosurgery.  Off Eliquis now.    Santhosh is doing fairly well from cardiology standpoint.  Follow-up in cardiac clinic in 1 year or sooner with any concerning symptoms.    Diagnosis and plan of care discussed with patient and verbalized understanding.            Your medication list            Accurate as of April 11, 2024  1:33 PM. If you have any questions, ask your nurse or doctor.                CONTINUE taking these medications        Instructions Last Dose Given Next Dose Due   Aspirin 81 MG capsule      aspirin 81 mg capsule   Take 1 capsule  every day by oral route.       atorvastatin 20 MG tablet  Commonly known as: LIPITOR      Take 1 tablet by mouth Every Evening.       butalbital-aspirin-caffeine-codeine -26-30 MG capsule  Commonly known as: FIORINAL WITH CODEINE      Take 1 capsule by mouth Every 4 (Four) Hours As Needed for Headache.       escitalopram 10 MG tablet  Commonly known as: LEXAPRO      Take 1 tablet by mouth Daily.       levETIRAcetam 500 MG tablet  Commonly known as: KEPPRA      Take 2 tablets by mouth 2 (Two) Times a Day.       LORazepam 0.5 MG tablet  Commonly known as: ATIVAN      Take 1 tablet by mouth As Needed for Anxiety.       Mavacamten 2.5 MG capsule      Take 2.5 mg by mouth Daily.       metoprolol succinate XL 50 MG 24 hr tablet  Commonly known as: TOPROL-XL      Take 1 tablet by mouth Daily.       multivitamin with minerals tablet tablet      Take 1 tablet by mouth Daily.       pantoprazole 40 MG EC tablet  Commonly known as: Protonix      Take 1 tablet by mouth Daily.       verapamil  MG CR tablet  Commonly known as: CALAN-SR      Take 1 tablet by mouth Daily.                    Max Husain MD  04/11/24  13:33 EDT

## 2024-05-16 RX ORDER — MAVACAMTEN 2.5 MG/1
CAPSULE, GELATIN COATED ORAL
Qty: 30 CAPSULE | Refills: 2 | OUTPATIENT
Start: 2024-05-16

## 2024-05-24 ENCOUNTER — HOSPITAL ENCOUNTER (OUTPATIENT)
Dept: CARDIOLOGY | Facility: HOSPITAL | Age: 54
Discharge: HOME | End: 2024-05-24
Payer: COMMERCIAL

## 2024-05-24 VITALS
OXYGEN SATURATION: 94 % | HEART RATE: 76 BPM | HEIGHT: 76 IN | WEIGHT: 204 LBS | SYSTOLIC BLOOD PRESSURE: 138 MMHG | DIASTOLIC BLOOD PRESSURE: 86 MMHG | BODY MASS INDEX: 24.84 KG/M2

## 2024-05-24 DIAGNOSIS — I42.1 HOCM (HYPERTROPHIC OBSTRUCTIVE CARDIOMYOPATHY): ICD-10-CM

## 2024-05-24 LAB
BH CV ECHO LEFT VENTRICLE GLOBAL LONGITUDINAL STRAIN: -21.6 %
BH CV ECHO MEAS - ACS: 2.6 CM
BH CV ECHO MEAS - AO MAX PG: 7.6 MMHG
BH CV ECHO MEAS - AO MEAN PG: 4 MMHG
BH CV ECHO MEAS - AO ROOT DIAM: 3.3 CM
BH CV ECHO MEAS - AO V2 MAX: 138 CM/SEC
BH CV ECHO MEAS - AO V2 VTI: 31.9 CM
BH CV ECHO MEAS - AVA(I,D): 4.1 CM2
BH CV ECHO MEAS - EDV(CUBED): 104.3 ML
BH CV ECHO MEAS - EDV(MOD-SP2): 91 ML
BH CV ECHO MEAS - EDV(MOD-SP4): 100 ML
BH CV ECHO MEAS - EF(MOD-BP): 70.6 %
BH CV ECHO MEAS - EF(MOD-SP2): 71.4 %
BH CV ECHO MEAS - EF(MOD-SP4): 70 %
BH CV ECHO MEAS - ESV(CUBED): 12.3 ML
BH CV ECHO MEAS - ESV(MOD-SP2): 26 ML
BH CV ECHO MEAS - ESV(MOD-SP4): 30 ML
BH CV ECHO MEAS - FS: 51 %
BH CV ECHO MEAS - IVS/LVPW: 0.99 CM
BH CV ECHO MEAS - IVSD: 1.1 CM
BH CV ECHO MEAS - LAT PEAK E' VEL: 5.5 CM/SEC
BH CV ECHO MEAS - LV DIASTOLIC VOL/BSA (35-75): 45.2 CM2
BH CV ECHO MEAS - LV MASS(C)D: 188.8 GRAMS
BH CV ECHO MEAS - LV MAX PG: 6.3 MMHG
BH CV ECHO MEAS - LV MEAN PG: 3 MMHG
BH CV ECHO MEAS - LV SYSTOLIC VOL/BSA (12-30): 13.6 CM2
BH CV ECHO MEAS - LV V1 MAX: 125 CM/SEC
BH CV ECHO MEAS - LV V1 VTI: 26.8 CM
BH CV ECHO MEAS - LVIDD: 4.7 CM
BH CV ECHO MEAS - LVIDS: 2.31 CM
BH CV ECHO MEAS - LVOT AREA: 4.9 CM2
BH CV ECHO MEAS - LVOT DIAM: 2.5 CM
BH CV ECHO MEAS - LVPWD: 1.11 CM
BH CV ECHO MEAS - MED PEAK E' VEL: 6.4 CM/SEC
BH CV ECHO MEAS - MR MAX PG: 42.8 MMHG
BH CV ECHO MEAS - MR MAX VEL: 327.1 CM/SEC
BH CV ECHO MEAS - MV A DUR: 0.13 SEC
BH CV ECHO MEAS - MV A MAX VEL: 62.6 CM/SEC
BH CV ECHO MEAS - MV DEC SLOPE: 224.6 CM/SEC2
BH CV ECHO MEAS - MV DEC TIME: 0.2 SEC
BH CV ECHO MEAS - MV E MAX VEL: 70.7 CM/SEC
BH CV ECHO MEAS - MV E/A: 1.13
BH CV ECHO MEAS - MV MAX PG: 2.15 MMHG
BH CV ECHO MEAS - MV MEAN PG: 1.03 MMHG
BH CV ECHO MEAS - MV P1/2T: 92 MSEC
BH CV ECHO MEAS - MV V2 VTI: 24.8 CM
BH CV ECHO MEAS - MVA(P1/2T): 2.39 CM2
BH CV ECHO MEAS - MVA(VTI): 5.3 CM2
BH CV ECHO MEAS - PULM A REVS DUR: 0.11 SEC
BH CV ECHO MEAS - PULM A REVS VEL: 24.8 CM/SEC
BH CV ECHO MEAS - PULM DIAS VEL: 35.5 CM/SEC
BH CV ECHO MEAS - PULM S/D: 1.3
BH CV ECHO MEAS - PULM SYS VEL: 46.3 CM/SEC
BH CV ECHO MEAS - SV(LVOT): 132.1 ML
BH CV ECHO MEAS - SV(MOD-SP2): 65 ML
BH CV ECHO MEAS - SV(MOD-SP4): 70 ML
BH CV ECHO MEAS - SVI(LVOT): 59.7 ML/M2
BH CV ECHO MEAS - SVI(MOD-SP2): 29.4 ML/M2
BH CV ECHO MEAS - SVI(MOD-SP4): 31.6 ML/M2
BH CV ECHO MEASUREMENTS AVERAGE E/E' RATIO: 11.88
BH CV XLRA - TDI S': 12.4 CM/SEC
LEFT ATRIUM VOLUME INDEX: 30.5 ML/M2

## 2024-05-24 PROCEDURE — 93308 TTE F-UP OR LMTD: CPT

## 2024-05-24 PROCEDURE — 93321 DOPPLER ECHO F-UP/LMTD STD: CPT

## 2024-05-24 PROCEDURE — 93356 MYOCRD STRAIN IMG SPCKL TRCK: CPT

## 2024-05-24 PROCEDURE — 93325 DOPPLER ECHO COLOR FLOW MAPG: CPT

## 2024-05-24 PROCEDURE — 25510000001 PERFLUTREN (DEFINITY) 8.476 MG IN SODIUM CHLORIDE (PF) 0.9 % 10 ML INJECTION: Performed by: INTERNAL MEDICINE

## 2024-05-24 RX ADMIN — PERFLUTREN 1.5 ML: 6.52 INJECTION, SUSPENSION INTRAVENOUS at 11:31

## 2024-05-28 ENCOUNTER — HOSPITAL ENCOUNTER (OUTPATIENT)
Dept: CARDIOLOGY | Facility: HOSPITAL | Age: 54
Discharge: HOME | End: 2024-05-28
Admitting: INTERNAL MEDICINE
Payer: COMMERCIAL

## 2024-05-28 VITALS
BODY MASS INDEX: 26.44 KG/M2 | HEIGHT: 74 IN | OXYGEN SATURATION: 97 % | SYSTOLIC BLOOD PRESSURE: 130 MMHG | HEART RATE: 63 BPM | DIASTOLIC BLOOD PRESSURE: 76 MMHG | WEIGHT: 206 LBS

## 2024-05-28 DIAGNOSIS — I42.1 HOCM (HYPERTROPHIC OBSTRUCTIVE CARDIOMYOPATHY): ICD-10-CM

## 2024-05-28 DIAGNOSIS — I27.20 PULMONARY HYPERTENSION: Primary | ICD-10-CM

## 2024-05-28 PROCEDURE — G0463 HOSPITAL OUTPT CLINIC VISIT: HCPCS

## 2024-05-28 NOTE — PROGRESS NOTES
Heart Failure & Pulmonary Arterial Hypertension Clinic  [facility]  James Yusuf M.D., Ph.D., [facility]       Max Husain MD  [address] [address]    Thank you for asking me to see Santhosh Pope.     History of Present Illness  This is a 54 y.o. male with:    1. PH by abnormal PA AT  2. HOCM      Santhosh Pope is a 54 y.o. male who presents today.  The patient is typically seen by Billy Kim MD.  The patient's cardiologist is Dr. Husain.     The patient returns to the clinic today for HOCM and PAH.  He was commenced originally on mavacamten at 5 mg daily, but this had to be reduced to 2.5 mg because his LVOT was less than 20 mmHg.  He then had a 2D TTE after 4 weeks on 2.5 mg which continued to show a persistent reduction in his LVOT less than 20 mmHg, so mavacamten was discontinued.  He was off mavacamten for 4 weeks.  He then had a 2D TTE which showed that it was acceptable to commence mavacamten at 2.5 mg daily.  He is now on 2.5 mg daily.    He had a 2D TTE for which she returns today for results.  He is able to obtain and afford his mavacamten.  Otherwise, he is on verapamil and Toprol-XL.  Stable exercise intolerance.  Denies orthopnea, PND, lower extremity edema, ascites, and early abdominal satiety.      Review of Systems - Review of Systems   Cardiovascular:  Positive for dyspnea on exertion.   Respiratory:  Positive for shortness of breath.    All other systems reviewed and are negative.      All other systems were reviewed and were negative.    Patient Active Problem List   Diagnosis    HOCM (hypertrophic obstructive cardiomyopathy)    Essential hypertension    Hypercholesterolemia    Brain tumor    Cerebral edema    Pulmonary hypertension       family history includes Cancer in his father and mother; Heart disease in his brother and father; Lupus in his mother.     reports that he quit smoking about 18 years ago. His smoking use included  cigarettes. He started smoking about 37 years ago. He has a 19 pack-year smoking history. He has been exposed to tobacco smoke. He has never used smokeless tobacco. He reports current alcohol use of about 1.0 standard drink of alcohol per week. He reports that he does not use drugs.    No Known Allergies        Current Outpatient Medications:     Aspirin 81 MG capsule, aspirin 81 mg capsule  Take 1 capsule every day by oral route., Disp: , Rfl:     atorvastatin (LIPITOR) 20 MG tablet, Take 1 tablet by mouth Every Evening., Disp: 90 tablet, Rfl: 3    butalbital-aspirin-caffeine-codeine (FIORINAL WITH CODEINE) -41-30 MG capsule, Take 1 capsule by mouth Every 4 (Four) Hours As Needed for Headache., Disp: , Rfl:     escitalopram (LEXAPRO) 10 MG tablet, Take 1 tablet by mouth Daily., Disp: , Rfl:     levETIRAcetam (KEPPRA) 500 MG tablet, Take 2 tablets by mouth 2 (Two) Times a Day., Disp: , Rfl:     LORazepam (ATIVAN) 0.5 MG tablet, Take 1 tablet by mouth As Needed for Anxiety., Disp: , Rfl:     Mavacamten 2.5 MG capsule, Take 2.5 mg by mouth Daily., Disp: 30 capsule, Rfl: 3    metoprolol succinate XL (TOPROL-XL) 50 MG 24 hr tablet, Take 1 tablet by mouth Daily., Disp: 90 tablet, Rfl: 3    multivitamin with minerals tablet tablet, Take 1 tablet by mouth Daily., Disp: , Rfl:     pantoprazole (Protonix) 40 MG EC tablet, Take 1 tablet by mouth Daily., Disp: , Rfl:     verapamil SR (CALAN-SR) 120 MG CR tablet, Take 1 tablet by mouth Daily., Disp: 90 tablet, Rfl: 1    Current Facility-Administered Medications:     nitroglycerin (NITROSTAT) SL tablet 0.4 mg, 0.4 mg, Sublingual, Q5 Min PRN, Max Husain MD    sodium chloride 0.9 % flush 10 mL, 10 mL, Intravenous, PRN, Max Husain MD    Vital Sign Review:     Vitals:    05/28/24 1037   BP: 130/76   Pulse: 63   SpO2: 97%     PP:Low  Pulse Ox: normal oxygenation on room air    Body mass index is 26.45 kg/m².      05/28/24  1037   Weight: 93.4 kg (206 lb)        Physical Exam:    Vitals reviewed.   Constitutional:       General: Not in acute distress.     Appearance: Normal and healthy appearance. Well-developed, well-groomed and not in distress. Not ill-appearing, toxic-appearing or diaphoretic.      Interventions: Not intubated.  Eyes:      Conjunctiva/sclera: Conjunctivae normal.      Pupils: Pupils are equal, round, and reactive to light.   Neck:      Vascular: No JVR. JVD normal with 6 cm of water.   Pulmonary:      Effort: Pulmonary effort is normal. No tachypnea, bradypnea, accessory muscle usage, prolonged expiration, respiratory distress or retractions. Not intubated.      Breath sounds: Normal breath sounds and air entry. No stridor, decreased air movement or transmitted upper airway sounds. No decreased breath sounds. No wheezing. No rhonchi. No rales.   Cardiovascular:      PMI at left midclavicular line. Normal rate. Regular rhythm. S1 with normal intensity. S2 with normal intensity.       Murmurs: There is a grade 1/6 mid frequency early systolic murmur at the ULSB.      No gallop.  No click. No rub.   Neurological:      General: No focal deficit present.      Mental Status: Alert and oriented to person, place and time.   Psychiatric:         Behavior: Behavior is cooperative.            DATA REVIEWED:       ---------------------------------------------------  TTE/PATRICK:    Results for orders placed during the hospital encounter of 05/24/24    Adult Transthoracic Echo Limited W/ Cont if Necessary Per Protocol    Interpretation Summary    Limited echocardiogram to assess LV systolic function.    Left ventricular systolic function is hyperdynamic (EF > 70%). Calculated left ventricular EF = 70.6%    Left ventricular diastolic function is consistent with (grade II w/high LAP) pseudonormalization.    Normal global longitudinal LV strain (GLS) = -21.6%.    Normal left ventricular cavity size and wall thickness      My interpretation of the TTE is below.  "    LVEF is 70%.  Mild asymmetric septal hypertrophy.  Hypertrophic cardiomyopathy with LVOT gradient of around 8 mmHg.    Laboratory evaluations:  Lab Results   Component Value Date    PROBNP 72.7 07/27/2021     Lab Results   Component Value Date    GLUCOSE 126 (H) 03/16/2022    BUN 13 03/16/2022    CREATININE 0.88 03/16/2022    EGFRIFNONA 75 09/14/2021    BCR 14.8 03/16/2022    K 4.3 03/16/2022    CO2 23.7 03/16/2022    CALCIUM 9.4 03/16/2022    ALBUMIN 4.70 03/16/2022    AST 16 03/16/2022    ALT 14 03/16/2022     No results found for: \"HGBA1C\"  No results found for: \"HGBA1C\"  Lab Results   Component Value Date    CREATININE 0.88 03/16/2022     No results found for: \"IRON\", \"TIBC\", \"FERRITIN\"  Lab Results   Component Value Date    WBC 19.54 (H) 03/16/2022    RBC 5.17 03/16/2022    HGB 15.3 03/16/2022    HCT 44.1 03/16/2022    MCV 85.3 03/16/2022    MCH 30.0 03/16/2022    MCHC 35.1 03/16/2022    RDW 13.2 03/16/2022    RDWSD 40.5 03/16/2022    MPV 9.8 03/16/2022     03/16/2022    NEUTRORELPCT 90.4 (H) 03/16/2022    LYMPHORELPCT 4.9 (L) 03/16/2022    MONORELPCT 3.8 (L) 03/16/2022    EOSRELPCT 0.0 (L) 03/16/2022    BASORELPCT 0.2 03/16/2022    NEUTROABS 8.0 (H) 10/19/2022    LYMPHSABS 0.95 03/16/2022    MONOSABS 0.75 03/16/2022    EOSABS 0.2 10/19/2022    BASOSABS 0.1 10/19/2022    NRBC 0.0 03/16/2022      No results found for: \"ISABELLA\", \"PROTURINE\", \"CHMJASL28DL\", \"ALBUMINU\", \"LABALPH1\", \"LABALPH2\", \"LABBETA\", \"GAMMAUR\", \"MSPIKEURINE\", \"IFRESULTU\", \"NOTE\"  No results found for: \"LABFREE\", \"FREEKAPPAL\", \"FREELAMBDA\", \"LABKAPP\"   Lab Results   Component Value Date    ALBUMIN 4.70 03/16/2022      No results found for: \"LABKAPP\"       PAH RISK ASSESSMENT:          Assessment & Plan      1. Pulmonary hypertension    2. HOCM (hypertrophic obstructive cardiomyopathy)        1.  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  This is likely from worsening LVOT gradients from MR and HOCM.  He has had resolution of his pulmonary " hypertension as his LVOT gradients normalized.    - No current indication for PAH-specific medicines.        2.    NYHA Stage C FC-Class II: Slight limitation of physical activity. Comfortable at rest Ordinary physical activity results in fatigue, palpitation, dyspnea (shortness of breath)   LV EF: Acceptable LVH: moderate.    Peak LVOT Gradient: 7 mmHg Holter showed minimal in degree PVCs.      Today, I looked at the patient's 2D TTE images with the patient.  The patient's LVEF is acceptable to continue mavacamten therapy.    - First-degree relative surveillance has been recommended  - No current indication for ICD  - Metoprolol and verapamil  - Mavacamten 2.5 mg daily; REMS performed. They are changing to [employer] in August. They were told they needed to pay 3,500 up front, but would then have a zero co-pay.   - Limited 2D TTE in 3 months and follow-up with Anca            Return for 2D TTE results in 3 months with Anca.        This document has been electronically signed by James Yusuf MD PhD on May 28, 2024 10:50 EDT        James Yusuf M.D., Ph.D., [facility] Failure and Pulmonary Hypertension [facility]  System Medical Director for HF and PAH

## 2024-05-28 NOTE — LETTER
May 28, 2024       No Recipients    Patient: Santhosh Pope   YOB: 1970   Date of Visit: 5/28/2024     Dear Billy Kim MD:       Thank you for referring Santhosh Pope to me for evaluation. Below are the relevant portions of my assessment and plan of care.    If you have questions, please do not hesitate to call me. I look forward to following Santhosh along with you.         Sincerely,        James Yusuf MD PhD        CC:   No Recipients    James Yusuf MD PhD  05/28/24 1050  Signed  Heart Failure & Pulmonary Arterial Hypertension Clinic  [facility]  James Yusuf M.D., Ph.D., [facility]       Max Husain MD  [address] [id number]    Thank you for asking me to see Santhosh Pope.     History of Present Illness  This is a 54 y.o. male with:    1. PH by abnormal PA AT  2. HOCM      Santhosh Pope is a 54 y.o. male who presents today.  The patient is typically seen by Billy Kim MD.  The patient's cardiologist is Dr. Husain.     The patient returns to the clinic today for HOCM and PAH.  He was commenced originally on mavacamten at 5 mg daily, but this had to be reduced to 2.5 mg because his LVOT was less than 20 mmHg.  He then had a 2D TTE after 4 weeks on 2.5 mg which continued to show a persistent reduction in his LVOT less than 20 mmHg, so mavacamten was discontinued.  He was off mavacamten for 4 weeks.  He then had a 2D TTE which showed that it was acceptable to commence mavacamten at 2.5 mg daily.  He is now on 2.5 mg daily.    He had a 2D TTE for which she returns today for results.  He is able to obtain and afford his mavacamten.  Otherwise, he is on verapamil and Toprol-XL.  Stable exercise intolerance.  Denies orthopnea, PND, lower extremity edema, ascites, and early abdominal satiety.      Review of Systems - Review of Systems   Cardiovascular:  Positive for dyspnea on exertion.   Respiratory:  Positive for  shortness of breath.    All other systems reviewed and are negative.      All other systems were reviewed and were negative.    Patient Active Problem List   Diagnosis   • HOCM (hypertrophic obstructive cardiomyopathy)   • Essential hypertension   • Hypercholesterolemia   • Brain tumor   • Cerebral edema   • Pulmonary hypertension       family history includes Cancer in his father and mother; Heart disease in his brother and father; Lupus in his mother.     reports that he quit smoking about 18 years ago. His smoking use included cigarettes. He started smoking about 37 years ago. He has a 19 pack-year smoking history. He has been exposed to tobacco smoke. He has never used smokeless tobacco. He reports current alcohol use of about 1.0 standard drink of alcohol per week. He reports that he does not use drugs.    No Known Allergies        Current Outpatient Medications:   •  Aspirin 81 MG capsule, aspirin 81 mg capsule  Take 1 capsule every day by oral route., Disp: , Rfl:   •  atorvastatin (LIPITOR) 20 MG tablet, Take 1 tablet by mouth Every Evening., Disp: 90 tablet, Rfl: 3  •  butalbital-aspirin-caffeine-codeine (FIORINAL WITH CODEINE) -74-30 MG capsule, Take 1 capsule by mouth Every 4 (Four) Hours As Needed for Headache., Disp: , Rfl:   •  escitalopram (LEXAPRO) 10 MG tablet, Take 1 tablet by mouth Daily., Disp: , Rfl:   •  levETIRAcetam (KEPPRA) 500 MG tablet, Take 2 tablets by mouth 2 (Two) Times a Day., Disp: , Rfl:   •  LORazepam (ATIVAN) 0.5 MG tablet, Take 1 tablet by mouth As Needed for Anxiety., Disp: , Rfl:   •  Mavacamten 2.5 MG capsule, Take 2.5 mg by mouth Daily., Disp: 30 capsule, Rfl: 3  •  metoprolol succinate XL (TOPROL-XL) 50 MG 24 hr tablet, Take 1 tablet by mouth Daily., Disp: 90 tablet, Rfl: 3  •  multivitamin with minerals tablet tablet, Take 1 tablet by mouth Daily., Disp: , Rfl:   •  pantoprazole (Protonix) 40 MG EC tablet, Take 1 tablet by mouth Daily., Disp: , Rfl:   •  verapamil SR  (CALAN-SR) 120 MG CR tablet, Take 1 tablet by mouth Daily., Disp: 90 tablet, Rfl: 1    Current Facility-Administered Medications:   •  nitroglycerin (NITROSTAT) SL tablet 0.4 mg, 0.4 mg, Sublingual, Q5 Min PRN, Max Husain MD  •  sodium chloride 0.9 % flush 10 mL, 10 mL, Intravenous, PRN, Max Husain MD    Vital Sign Review:     Vitals:    05/28/24 1037   BP: 130/76   Pulse: 63   SpO2: 97%     PP:Low  Pulse Ox: normal oxygenation on room air    Body mass index is 26.45 kg/m².      05/28/24  1037   Weight: 93.4 kg (206 lb)       Physical Exam:    Vitals reviewed.   Constitutional:       General: Not in acute distress.     Appearance: Normal and healthy appearance. Well-developed, well-groomed and not in distress. Not ill-appearing, toxic-appearing or diaphoretic.      Interventions: Not intubated.  Eyes:      Conjunctiva/sclera: Conjunctivae normal.      Pupils: Pupils are equal, round, and reactive to light.   Neck:      Vascular: No JVR. JVD normal with 6 cm of water.   Pulmonary:      Effort: Pulmonary effort is normal. No tachypnea, bradypnea, accessory muscle usage, prolonged expiration, respiratory distress or retractions. Not intubated.      Breath sounds: Normal breath sounds and air entry. No stridor, decreased air movement or transmitted upper airway sounds. No decreased breath sounds. No wheezing. No rhonchi. No rales.   Cardiovascular:      PMI at left midclavicular line. Normal rate. Regular rhythm. S1 with normal intensity. S2 with normal intensity.       Murmurs: There is a grade 1/6 mid frequency early systolic murmur at the ULSB.      No gallop.  No click. No rub.   Neurological:      General: No focal deficit present.      Mental Status: Alert and oriented to person, place and time.   Psychiatric:         Behavior: Behavior is cooperative.            DATA REVIEWED:       ---------------------------------------------------  TTE/PATRICK:    Results for orders placed during the hospital  "encounter of 05/24/24    Adult Transthoracic Echo Limited W/ Cont if Necessary Per Protocol    Interpretation Summary  •  Limited echocardiogram to assess LV systolic function.  •  Left ventricular systolic function is hyperdynamic (EF > 70%). Calculated left ventricular EF = 70.6%  •  Left ventricular diastolic function is consistent with (grade II w/high LAP) pseudonormalization.  •  Normal global longitudinal LV strain (GLS) = -21.6%.  •  Normal left ventricular cavity size and wall thickness      My interpretation of the TTE is below.     LVEF is 70%.  Mild asymmetric septal hypertrophy.  Hypertrophic cardiomyopathy with LVOT gradient of around 8 mmHg.    Laboratory evaluations:  Lab Results   Component Value Date    PROBNP 72.7 07/27/2021     Lab Results   Component Value Date    GLUCOSE 126 (H) 03/16/2022    BUN 13 03/16/2022    CREATININE 0.88 03/16/2022    EGFRIFNONA 75 09/14/2021    BCR 14.8 03/16/2022    K 4.3 03/16/2022    CO2 23.7 03/16/2022    CALCIUM 9.4 03/16/2022    ALBUMIN 4.70 03/16/2022    AST 16 03/16/2022    ALT 14 03/16/2022     No results found for: \"HGBA1C\"  No results found for: \"HGBA1C\"  Lab Results   Component Value Date    CREATININE 0.88 03/16/2022     No results found for: \"IRON\", \"TIBC\", \"FERRITIN\"  Lab Results   Component Value Date    WBC 19.54 (H) 03/16/2022    RBC 5.17 03/16/2022    HGB 15.3 03/16/2022    HCT 44.1 03/16/2022    MCV 85.3 03/16/2022    MCH 30.0 03/16/2022    MCHC 35.1 03/16/2022    RDW 13.2 03/16/2022    RDWSD 40.5 03/16/2022    MPV 9.8 03/16/2022     03/16/2022    NEUTRORELPCT 90.4 (H) 03/16/2022    LYMPHORELPCT 4.9 (L) 03/16/2022    MONORELPCT 3.8 (L) 03/16/2022    EOSRELPCT 0.0 (L) 03/16/2022    BASORELPCT 0.2 03/16/2022    NEUTROABS 8.0 (H) 10/19/2022    LYMPHSABS 0.95 03/16/2022    MONOSABS 0.75 03/16/2022    EOSABS 0.2 10/19/2022    BASOSABS 0.1 10/19/2022    NRBC 0.0 03/16/2022      No results found for: \"ISABELLA\", \"PROTURINE\", \"DBAIPIX90CU\", \"ALBUMINU\", " "\"LABALPH1\", \"LABALPH2\", \"LABBETA\", \"GAMMAUR\", \"MSPIKEURINE\", \"IFRESULTU\", \"NOTE\"  No results found for: \"LABFREE\", \"FREEKAPPAL\", \"FREELAMBDA\", \"LABKAPP\"   Lab Results   Component Value Date    ALBUMIN 4.70 03/16/2022      No results found for: \"LABKAPP\"       PAH RISK ASSESSMENT:          Assessment & Plan     1. Pulmonary hypertension    2. HOCM (hypertrophic obstructive cardiomyopathy)        1.  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  This is likely from worsening LVOT gradients from MR and HOCM.  He has had resolution of his pulmonary hypertension as his LVOT gradients normalized.    - No current indication for PAH-specific medicines.        2.    NYHA Stage C FC-Class II: Slight limitation of physical activity. Comfortable at rest Ordinary physical activity results in fatigue, palpitation, dyspnea (shortness of breath)   LV EF: Acceptable LVH: moderate.    Peak LVOT Gradient: 7 mmHg Holter showed minimal in degree PVCs.      Today, I looked at the patient's 2D TTE images with the patient.  The patient's LVEF is acceptable to continue mavacamten therapy.    - First-degree relative surveillance has been recommended  - No current indication for ICD  - Metoprolol and verapamil  - Mavacamten 2.5 mg daily; REMS performed. They are changing to [employer] in August. They were told they needed to pay 3,500 up front, but would then have a zero co-pay.   - Limited 2D TTE in 3 months and follow-up with Anca            Return for 2D TTE results in 3 months with Anca.        This document has been electronically signed by James Yusuf MD PhD on May 28, 2024 10:50 EDT        James Yusuf M.D., Ph.D., [facility] Pul[facility]pert[facility]  System Medical Director for HF and PAH      "

## 2024-05-29 NOTE — ADDENDUM NOTE
Encounter addended by: Prabha Foy MA on: 5/29/2024 4:38 PM   Actions taken: Charge Capture section accepted

## 2024-05-31 ENCOUNTER — TELEPHONE (OUTPATIENT)
Dept: CARDIOLOGY | Facility: HOSPITAL | Age: 54
End: 2024-05-31
Payer: COMMERCIAL

## 2024-05-31 NOTE — TELEPHONE ENCOUNTER
Spoke to patient's wife. Unfortunately, there is nothing that can be done proactively until patient's insurance switches. Once switch is made, will work with [employer] on the benefits investigation. Then, patient will have a couple of options. If copay is unaffordable through insurance, he can apply for the [employer] glynn if funding is open. If funding is closed, he can apply for patient assistance. They should meet criteria for both as total household income for household size of 2 is ~$60,000. Asked patient's wife to contact us ASAP once insurance switches in August. No further questions at this time.      Anca Holguin, PharmD, BCACP  [facility]  Phone: [phone]

## 2024-07-31 DIAGNOSIS — I27.20 PULMONARY HYPERTENSION: ICD-10-CM

## 2024-08-16 ENCOUNTER — TELEPHONE (OUTPATIENT)
Dept: CARDIOLOGY | Facility: HOSPITAL | Age: 54
End: 2024-08-16
Payer: MEDICARE

## 2024-08-16 NOTE — TELEPHONE ENCOUNTER
The patient was recently prescribed Depakote and called the HF clinic to verify if this interacted with Camzyos. I reviewed this for drug interactions and there are no issues.     The patient also had a benefits change to [employer] Part D. I processed a prior authorization for Camzyos and this has been approved. PA information below:    PA Case: [id number], Status: Approved, Coverage Starts on: 8/16/2024 12:00:00 AM, Coverage Ends on: 2/12/2025 12:00:00 AM    I ran a test claim through [employer] Part D and estimated copay was $2327.58; however, this put him in coverage gap. His plan covers Camzyos at 33% coinsurance (~ $1659.90/30 days).    I spoke with spouse, Indira, and got him enrolled in the [employer] - Medicare Access fund. Zak information:    - [place] ID: [id number]  - Start Date: 7/17/2024  - End Date: 7/16/2025  - Zak amount: $10,000    This amount should cover entire coverage gap of $8000 and allow for him to get into catastrophic coverage. Once in catastrophic coverage, he will have no copay for remainder of the year. Starting in 2025, there have been some changes to the Medicare OOP spending cap, which is now $2000. Ultimately, this total should cover everything. Spoke to Indira to explain all of this and she verbalized understanding. She will keep an eye out for the copay card that will need to be applied with his primary insurance with the Camzyos to bring cost to $0.    With patient's insurance now being [employer], he will likely need to have his Camzyos filled at [facility]. Patient currently has the Camzyos and appointment is scheduled for 8/27 for follow up and repeat TTE. At that point, we will forward new prescription to [facility].    Khurram Marrero, PharmD, BCACP  HF Clinic Pharmacist

## 2024-08-17 ENCOUNTER — LAB (OUTPATIENT)
Dept: LAB | Facility: HOSPITAL | Age: 54
End: 2024-08-17
Payer: MEDICARE

## 2024-08-17 ENCOUNTER — TRANSCRIBE ORDERS (OUTPATIENT)
Dept: ADMINISTRATIVE | Facility: HOSPITAL | Age: 54
End: 2024-08-17
Payer: MEDICARE

## 2024-08-17 DIAGNOSIS — D32.0 BENIGN NEOPLASM OF CEREBRAL MENINGES: ICD-10-CM

## 2024-08-17 DIAGNOSIS — E55.9 AVITAMINOSIS D: ICD-10-CM

## 2024-08-17 DIAGNOSIS — S06.9X0S: ICD-10-CM

## 2024-08-17 DIAGNOSIS — G40.209 COMPLEX PARTIAL SEIZURES WITH CONSCIOUSNESS IMPAIRED: Primary | ICD-10-CM

## 2024-08-17 DIAGNOSIS — G40.209 COMPLEX PARTIAL SEIZURES WITH CONSCIOUSNESS IMPAIRED: ICD-10-CM

## 2024-08-17 LAB
25(OH)D3 SERPL-MCNC: 35.8 NG/ML (ref 30–100)
BASOPHILS # BLD AUTO: 0.04 10*3/MM3 (ref 0–0.2)
BASOPHILS NFR BLD AUTO: 0.5 % (ref 0–1.5)
DEPRECATED RDW RBC AUTO: 41.3 FL (ref 37–54)
EOSINOPHIL # BLD AUTO: 0.24 10*3/MM3 (ref 0–0.4)
EOSINOPHIL NFR BLD AUTO: 2.8 % (ref 0.3–6.2)
ERYTHROCYTE [DISTWIDTH] IN BLOOD BY AUTOMATED COUNT: 12.8 % (ref 12.3–15.4)
HCT VFR BLD AUTO: 44.9 % (ref 37.5–51)
HGB BLD-MCNC: 14.9 G/DL (ref 13–17.7)
IMM GRANULOCYTES # BLD AUTO: 0.03 10*3/MM3 (ref 0–0.05)
IMM GRANULOCYTES NFR BLD AUTO: 0.4 % (ref 0–0.5)
LYMPHOCYTES # BLD AUTO: 1.79 10*3/MM3 (ref 0.7–3.1)
LYMPHOCYTES NFR BLD AUTO: 20.9 % (ref 19.6–45.3)
MCH RBC QN AUTO: 29.3 PG (ref 26.6–33)
MCHC RBC AUTO-ENTMCNC: 33.2 G/DL (ref 31.5–35.7)
MCV RBC AUTO: 88.2 FL (ref 79–97)
MONOCYTES # BLD AUTO: 0.54 10*3/MM3 (ref 0.1–0.9)
MONOCYTES NFR BLD AUTO: 6.3 % (ref 5–12)
NEUTROPHILS NFR BLD AUTO: 5.93 10*3/MM3 (ref 1.7–7)
NEUTROPHILS NFR BLD AUTO: 69.1 % (ref 42.7–76)
NRBC BLD AUTO-RTO: 0 /100 WBC (ref 0–0.2)
PLATELET # BLD AUTO: 272 10*3/MM3 (ref 140–450)
PMV BLD AUTO: 10.1 FL (ref 6–12)
RBC # BLD AUTO: 5.09 10*6/MM3 (ref 4.14–5.8)
WBC NRBC COR # BLD AUTO: 8.57 10*3/MM3 (ref 3.4–10.8)

## 2024-08-17 PROCEDURE — 82306 VITAMIN D 25 HYDROXY: CPT

## 2024-08-17 PROCEDURE — 80053 COMPREHEN METABOLIC PANEL: CPT | Performed by: NURSE PRACTITIONER

## 2024-08-17 PROCEDURE — 85025 COMPLETE CBC W/AUTO DIFF WBC: CPT

## 2024-08-17 PROCEDURE — 36415 COLL VENOUS BLD VENIPUNCTURE: CPT

## 2024-08-20 LAB
ALBUMIN SERPL-MCNC: 4.6 G/DL (ref 3.5–5.2)
ALBUMIN/GLOB SERPL: 2.3 G/DL
ALP SERPL-CCNC: 117 U/L (ref 39–117)
ALT SERPL W P-5'-P-CCNC: 75 U/L (ref 1–41)
ANION GAP SERPL CALCULATED.3IONS-SCNC: 20 MMOL/L (ref 5–15)
AST SERPL-CCNC: 95 U/L (ref 1–40)
BILIRUB SERPL-MCNC: 0.3 MG/DL (ref 0–1.2)
BUN SERPL-MCNC: 10 MG/DL (ref 6–20)
BUN/CREAT SERPL: 8.3 (ref 7–25)
CALCIUM SPEC-SCNC: 9.3 MG/DL (ref 8.6–10.5)
CHLORIDE SERPL-SCNC: 105 MMOL/L (ref 98–107)
CO2 SERPL-SCNC: 20 MMOL/L (ref 22–29)
CREAT SERPL-MCNC: 1.21 MG/DL (ref 0.76–1.27)
EGFRCR SERPLBLD CKD-EPI 2021: 70.7 ML/MIN/1.73
GLOBULIN UR ELPH-MCNC: 2 GM/DL
GLUCOSE SERPL-MCNC: 120 MG/DL (ref 65–99)
POTASSIUM SERPL-SCNC: 3.9 MMOL/L (ref 3.5–5.2)
PROT SERPL-MCNC: 6.6 G/DL (ref 6–8.5)
SODIUM SERPL-SCNC: 145 MMOL/L (ref 136–145)

## 2024-08-22 ENCOUNTER — TELEPHONE (OUTPATIENT)
Dept: CARDIOLOGY | Facility: HOSPITAL | Age: 54
End: 2024-08-22
Payer: MEDICARE

## 2024-08-22 NOTE — TELEPHONE ENCOUNTER
Spouse, Indira, called to follow up on CMP results. Ultimately, the only significant finding was a slight elevation in liver enzymes. Verified patient does not drink alcohol. The only potential medication causes I could identify would be atorvastatin. I informed her that I have sent message to Dr. Husain to inform him of this.    I did verify with wife that the patient had not started Depakote yet as [facility] was waiting on CMP results. Notified her that they should be able to view these in Care Everywhere.     Khurram Marrero, PharmD, BCACP  HF Clinic Pharmacist

## 2024-08-22 NOTE — TELEPHONE ENCOUNTER
Attempted to call patient to go over lab results. Left message for patient to call HF clinic back.    Khurram Marrero, PharmD, BCACP  HF Clinic Pharmacist

## 2024-08-23 ENCOUNTER — TELEPHONE (OUTPATIENT)
Dept: CARDIOLOGY | Facility: HOSPITAL | Age: 54
End: 2024-08-23
Payer: MEDICARE

## 2024-08-23 DIAGNOSIS — E78.00 HYPERCHOLESTEROLEMIA: Primary | ICD-10-CM

## 2024-08-23 NOTE — TELEPHONE ENCOUNTER
Spoke with wife, Indira, and patient regarding plan per Dr. Husain as provided below. Will hold atorvastatin for now. Orders put in for repeat CMP and lipid panel in 2 weeks. Patient will need to be fasting prior to getting lipid panel done. Patient and wife verbalized understanding and do not have any questions at this time.    Khurram Marrero PharmD, [facility] Pharmacist      Max Husain MD Ehringer, Daniel, Jason  I do agree to hold and see repeat CMP in 2 weeks with lipid panel. No compelling indication for statin right at this point.  Appreciate your help!     ----- Message -----  From: Khurram Marrero PharmD  Sent: 8/22/2024   2:22 PM EDT  To: Max Husain MD  Subject: Transaminitis                                    Hey Dr. Husain,    This patient had a CMP that resulted on 8/17 and his liver enzymes are slightly elevated (AST - 95; ALT - 75). I looked in Care Everywhere and previous liver enzymes were normal back in 2022. It doesn't appear he is a daily alcohol user, so not sure if it could be related to his statin? I just wanted to let you know in case you felt the patient should hold his statin. In some cases, mild elevations like this can recover with continued use.    Khurram Marrero PharmD, [facility] Pharmacist

## 2024-08-27 ENCOUNTER — HOSPITAL ENCOUNTER (OUTPATIENT)
Dept: CARDIOLOGY | Facility: HOSPITAL | Age: 54
Discharge: HOME | End: 2024-08-27
Payer: MEDICARE

## 2024-08-27 VITALS
DIASTOLIC BLOOD PRESSURE: 80 MMHG | BODY MASS INDEX: 26.44 KG/M2 | HEART RATE: 70 BPM | WEIGHT: 206 LBS | HEIGHT: 74 IN | SYSTOLIC BLOOD PRESSURE: 126 MMHG

## 2024-08-27 VITALS
HEART RATE: 60 BPM | HEIGHT: 74 IN | SYSTOLIC BLOOD PRESSURE: 143 MMHG | DIASTOLIC BLOOD PRESSURE: 90 MMHG | BODY MASS INDEX: 26.56 KG/M2 | OXYGEN SATURATION: 96 % | WEIGHT: 207 LBS

## 2024-08-27 DIAGNOSIS — I42.1 HOCM (HYPERTROPHIC OBSTRUCTIVE CARDIOMYOPATHY): ICD-10-CM

## 2024-08-27 DIAGNOSIS — I42.1 CARDIOMYOPATHY, HYPERTROPHIC OBSTRUCTIVE: Primary | ICD-10-CM

## 2024-08-27 LAB
AV LVOT PEAK GRADIENT: 8 MMHG
BH CV ECHO HCM PROVOKED PEAK GRADIENT: 13 MMHG
BH CV ECHO LEFT VENTRICLE BASAL CAVITARY GRADIENT: 10 MMHG
BH CV ECHO LEFT VENTRICLE GLOBAL LONGITUDINAL STRAIN: -19.5 %
BH CV ECHO MEAS - EDV(CUBED): 87 ML
BH CV ECHO MEAS - EDV(MOD-SP2): 113 ML
BH CV ECHO MEAS - EDV(MOD-SP4): 154 ML
BH CV ECHO MEAS - EF(MOD-BP): 69.3 %
BH CV ECHO MEAS - EF(MOD-SP2): 69 %
BH CV ECHO MEAS - EF(MOD-SP4): 69.5 %
BH CV ECHO MEAS - EF_3D-VOL: 61 %
BH CV ECHO MEAS - ESV(CUBED): 16.1 ML
BH CV ECHO MEAS - ESV(MOD-SP2): 35 ML
BH CV ECHO MEAS - ESV(MOD-SP4): 47 ML
BH CV ECHO MEAS - FS: 43 %
BH CV ECHO MEAS - IVS/LVPW: 0.81 CM
BH CV ECHO MEAS - IVSD: 1 CM
BH CV ECHO MEAS - LA 3D VOL INDEX: 24
BH CV ECHO MEAS - LAT PEAK E' VEL: 9.6 CM/SEC
BH CV ECHO MEAS - LV DIASTOLIC VOL/BSA (35-75): 70 CM2
BH CV ECHO MEAS - LV MASS(C)D: 175.3 GRAMS
BH CV ECHO MEAS - LV SYSTOLIC VOL/BSA (12-30): 21.4 CM2
BH CV ECHO MEAS - LVIDD: 4.4 CM
BH CV ECHO MEAS - LVIDS: 2.5 CM
BH CV ECHO MEAS - LVPWD: 1.24 CM
BH CV ECHO MEAS - MED PEAK E' VEL: 11.5 CM/SEC
BH CV ECHO MEAS - MV A DUR: 0.15 SEC
BH CV ECHO MEAS - MV A MAX VEL: 81.2 CM/SEC
BH CV ECHO MEAS - MV DEC SLOPE: 354.3 CM/SEC2
BH CV ECHO MEAS - MV DEC TIME: 0.15 SEC
BH CV ECHO MEAS - MV E MAX VEL: 76 CM/SEC
BH CV ECHO MEAS - MV E/A: 0.94
BH CV ECHO MEAS - MV MAX PG: 3.1 MMHG
BH CV ECHO MEAS - MV MEAN PG: 1.34 MMHG
BH CV ECHO MEAS - MV P1/2T: 74.3 MSEC
BH CV ECHO MEAS - MV V2 VTI: 27.9 CM
BH CV ECHO MEAS - MVA(P1/2T): 3 CM2
BH CV ECHO MEAS - PULM A REVS DUR: 0.17 SEC
BH CV ECHO MEAS - PULM A REVS VEL: 31.3 CM/SEC
BH CV ECHO MEAS - PULM DIAS VEL: 26.4 CM/SEC
BH CV ECHO MEAS - PULM S/D: 1.39
BH CV ECHO MEAS - PULM SYS VEL: 36.7 CM/SEC
BH CV ECHO MEAS - SV(MOD-SP2): 78 ML
BH CV ECHO MEAS - SV(MOD-SP4): 107 ML
BH CV ECHO MEAS - SVI(MOD-SP2): 35.4 ML/M2
BH CV ECHO MEAS - SVI(MOD-SP4): 48.6 ML/M2
BH CV ECHO MEASUREMENTS AVERAGE E/E' RATIO: 7.2
LEFT ATRIUM VOLUME INDEX: 21.9 ML/M2

## 2024-08-27 PROCEDURE — 25510000001 PERFLUTREN 6.52 MG/ML SUSPENSION 2 ML VIAL: Performed by: INTERNAL MEDICINE

## 2024-08-27 PROCEDURE — 93356 MYOCRD STRAIN IMG SPCKL TRCK: CPT

## 2024-08-27 PROCEDURE — 93321 DOPPLER ECHO F-UP/LMTD STD: CPT

## 2024-08-27 PROCEDURE — 93308 TTE F-UP OR LMTD: CPT

## 2024-08-27 PROCEDURE — G0463 HOSPITAL OUTPT CLINIC VISIT: HCPCS

## 2024-08-27 PROCEDURE — 93325 DOPPLER ECHO COLOR FLOW MAPG: CPT

## 2024-08-27 RX ORDER — DIVALPROEX SODIUM 250 MG/1
250 TABLET, DELAYED RELEASE ORAL
COMMUNITY
Start: 2024-08-22 | End: 2025-02-18

## 2024-08-27 RX ORDER — MIDAZOLAM 5 MG/.1ML
1 SPRAY NASAL
COMMUNITY
Start: 2024-07-02

## 2024-08-27 RX ADMIN — PERFLUTREN 2 ML: 6.52 INJECTION, SUSPENSION INTRAVENOUS at 12:32

## 2024-08-27 NOTE — LETTER
August 27, 2024       No Recipients    Patient: Santhosh Pope   YOB: 1970   Date of Visit: 8/27/2024     Dear James Yusuf MD PhD:       Thank you for referring Santhosh Pope to me for evaluation. Below are the relevant portions of my assessment and plan of care.    If you have questions, please do not hesitate to call me. I look forward to following Santhosh along with you.         Sincerely,    Madeleine Tovar, GeoffD

## 2024-08-27 NOTE — PROGRESS NOTES
[facility] Heart Failure Clinic        Patient Name: Santhosh Pope  :1970  Age: 53 y.o.  Sex: male  Referring Provider: Max Husain MD   Primary Cardiologist: Max Husain MD  Encounter Provider: Madeleine Tovar, GoeffD      HPI:  Patient presents to the HF for their follow-up visit for Camzyos management. 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 The patient was accompanied by wife, and she was present during the appointment.  Patient is feeling improvement in symptoms, and no new side effects to report. The patient was recently prescribed Depakote by neurologist.    Initiation Phase:    Baseline Echo (10/5/23):   EF 75.5%; VLVOT 57 mmHg   Camzyos started at 5 mg daily      4-wk f/u echo (23):  EF 61-65%; VLVOT 10 mmHg  Camzyos decreased to 2.5 mg daily     8-wk f/u echo (1/10/24):  EF 67.9%; VLVOT 5 mmHg  Camzyos STOPPED     12-wk f/u echo (24):  EF 70.9%; VLVOT 12 mmHg  Camzyos restarted at 2.5 mg daily    Maintenance Phase:       4-wk f/u echo (3/6/24):  EF 68.9 %: VLVOT 7 mmHg  Camzyos continued at 2.5 mg daily      8-wk repeat echo (24):    EF 70.6%; VLVOT 11.6 mmHg    Camzyos continued at 2.5 mg daily    12-wk f/u echo (24):  EF 69.3%; VLVOT 10 mmHg     Current Regimen:  CV Meds  Verapamil  mg CR daily  Metoprolol succinate XL 50 mg daily  Atorvastatin 20 mg daily (holding)  Aspirin 81 mg daily    Medication Use:  Adherence: Good. Missed 0 doses in the last week. Adherence tools used include: pillbox. Barriers for adherence include: none  Past hx of medication use/intolerance: none  Affordability: Commercial - [employer] BCBS PPO     Social History:  Tobacco use: former smoker, quit in 2006 (1 PPD x 19 years)  EtOH use: occasionally  Illicit drug use: no history of illicit drug use  Exercise: limited physical activity due to health/physical limitations-  working around the house/outside causes some shortness of breath/fatigue    Immunization Status:  Pneumococcal: none  Influenza: 9/30/20  COVID-19: 6/22/21, 7/20/21 (Moderna)     Adult Transthoracic Echo Limited W/ Cont if Necessary Per Protocol                    Limited echocardiogram mavacamten    Left ventricular systolic function is normal. Calculated left ventricular EF = 69.3%.  Mild to moderate septal asymmetric hypertrophy.  Normal diastolic function.    Normal right ventricular cavity size, systolic function and septal motion noted. Right ventricular wall thickness is consistent with moderate hypertrophy.    The findings are consistent with hypertrophic cardiomyopathy. Left ventricular intracavitary gradient noted to be 10 mmHg    ASSESSMENT/PLAN OF CARE:      HOCM   The patient is tolerating Camzyos well  Repeat 12-wk TTE shows LVEF > 55% and VLVOT of < 30 mmHg  Continue Camzyos 2.5 mg daily per dosing guide.     REMS completed.  Plan to discontinue verapamil and continue metoprolol succinate for now, in setting of HOCM improvement with use of Camzyos.   STOP Verapamil 120 mg once daily  Patient reports they have stopped the atorvastatin. He will be starting depakote as soon as it is delivered from mail order pharmacy. Drug interactions reviewed. No new drug interactions identified. Reminded patient to call before starting any new RX/OTC/herbals/vitamins/supplements   Follow-up ECHO in 12 weeks     Thank you for allowing me to participate in the care of your patient,    Madeleine Tovar, PharmD  8/27/24

## 2024-08-27 NOTE — ADDENDUM NOTE
Encounter addended by: Khurram Marrero, PharmD on: 8/27/2024 3:58 PM   Actions taken: Clinical Note Signed

## 2024-08-28 DIAGNOSIS — I42.1 HOCM (HYPERTROPHIC OBSTRUCTIVE CARDIOMYOPATHY): Primary | ICD-10-CM

## 2024-09-07 ENCOUNTER — LAB (OUTPATIENT)
Dept: LAB | Facility: HOSPITAL | Age: 54
End: 2024-09-07
Payer: MEDICARE

## 2024-09-07 DIAGNOSIS — E78.00 HYPERCHOLESTEROLEMIA: ICD-10-CM

## 2024-09-07 LAB
ALBUMIN SERPL-MCNC: 4.5 G/DL (ref 3.5–5.2)
ALBUMIN/GLOB SERPL: 1.8 G/DL
ALP SERPL-CCNC: 115 U/L (ref 39–117)
ALT SERPL W P-5'-P-CCNC: 36 U/L (ref 1–41)
ANION GAP SERPL CALCULATED.3IONS-SCNC: 14 MMOL/L (ref 5–15)
ARTICHOKE IGE QN: 114 MG/DL (ref 0–100)
AST SERPL-CCNC: 21 U/L (ref 1–40)
BILIRUB SERPL-MCNC: 0.2 MG/DL (ref 0–1.2)
BUN SERPL-MCNC: 14 MG/DL (ref 6–20)
BUN/CREAT SERPL: 12.6 (ref 7–25)
CALCIUM SPEC-SCNC: 9.4 MG/DL (ref 8.6–10.5)
CHLORIDE SERPL-SCNC: 102 MMOL/L (ref 98–107)
CHOLEST SERPL-MCNC: 237 MG/DL (ref 0–200)
CO2 SERPL-SCNC: 24 MMOL/L (ref 22–29)
CREAT SERPL-MCNC: 1.11 MG/DL (ref 0.76–1.27)
EGFRCR SERPLBLD CKD-EPI 2021: 78.4 ML/MIN/1.73
GLOBULIN UR ELPH-MCNC: 2.5 GM/DL
GLUCOSE SERPL-MCNC: 89 MG/DL (ref 65–99)
HDLC SERPL-MCNC: 22 MG/DL (ref 40–60)
LDLC SERPL CALC-MCNC: ABNORMAL MG/DL
LDLC/HDLC SERPL: ABNORMAL {RATIO}
POTASSIUM SERPL-SCNC: 3.8 MMOL/L (ref 3.5–5.2)
PROT SERPL-MCNC: 7 G/DL (ref 6–8.5)
SODIUM SERPL-SCNC: 140 MMOL/L (ref 136–145)
TRIGL SERPL-MCNC: 841 MG/DL (ref 0–150)
VLDLC SERPL-MCNC: ABNORMAL MG/DL

## 2024-09-07 PROCEDURE — 36415 COLL VENOUS BLD VENIPUNCTURE: CPT

## 2024-09-07 PROCEDURE — 80053 COMPREHEN METABOLIC PANEL: CPT

## 2024-09-07 PROCEDURE — 80061 LIPID PANEL: CPT

## 2024-09-07 PROCEDURE — 83721 ASSAY OF BLOOD LIPOPROTEIN: CPT

## 2024-09-08 DIAGNOSIS — E78.00 HYPERCHOLESTEROLEMIA: Primary | ICD-10-CM

## 2024-09-08 NOTE — PROGRESS NOTES
Please notify Mr. Pope that blood work shows significantly elevated triglyceride levels I was wondering if the test was done fasting.  I have ordered for repeat test for this week to be done as fasting.  Thank you

## 2024-09-11 ENCOUNTER — LAB (OUTPATIENT)
Dept: LAB | Facility: HOSPITAL | Age: 54
End: 2024-09-11
Payer: MEDICARE

## 2024-09-11 DIAGNOSIS — E78.00 HYPERCHOLESTEROLEMIA: Primary | ICD-10-CM

## 2024-09-11 DIAGNOSIS — E78.00 HYPERCHOLESTEROLEMIA: ICD-10-CM

## 2024-09-11 LAB
ALBUMIN SERPL-MCNC: 4.5 G/DL (ref 3.5–5.2)
ALBUMIN/GLOB SERPL: 1.8 G/DL
ALP SERPL-CCNC: 102 U/L (ref 39–117)
ALT SERPL W P-5'-P-CCNC: 41 U/L (ref 1–41)
ANION GAP SERPL CALCULATED.3IONS-SCNC: 10.3 MMOL/L (ref 5–15)
AST SERPL-CCNC: 24 U/L (ref 1–40)
BILIRUB SERPL-MCNC: 0.2 MG/DL (ref 0–1.2)
BUN SERPL-MCNC: 13 MG/DL (ref 6–20)
BUN/CREAT SERPL: 10.7 (ref 7–25)
CALCIUM SPEC-SCNC: 9.6 MG/DL (ref 8.6–10.5)
CHLORIDE SERPL-SCNC: 106 MMOL/L (ref 98–107)
CHOLEST SERPL-MCNC: 237 MG/DL (ref 0–200)
CO2 SERPL-SCNC: 28.7 MMOL/L (ref 22–29)
CREAT SERPL-MCNC: 1.21 MG/DL (ref 0.76–1.27)
EGFRCR SERPLBLD CKD-EPI 2021: 70.7 ML/MIN/1.73
GLOBULIN UR ELPH-MCNC: 2.5 GM/DL
GLUCOSE SERPL-MCNC: 92 MG/DL (ref 65–99)
HDLC SERPL-MCNC: 27 MG/DL (ref 40–60)
LDLC SERPL CALC-MCNC: 136 MG/DL (ref 0–100)
LDLC/HDLC SERPL: 4.78 {RATIO}
POTASSIUM SERPL-SCNC: 4.1 MMOL/L (ref 3.5–5.2)
PROT SERPL-MCNC: 7 G/DL (ref 6–8.5)
SODIUM SERPL-SCNC: 145 MMOL/L (ref 136–145)
TRIGL SERPL-MCNC: 405 MG/DL (ref 0–150)
VLDLC SERPL-MCNC: 74 MG/DL (ref 5–40)

## 2024-09-11 PROCEDURE — 36415 COLL VENOUS BLD VENIPUNCTURE: CPT

## 2024-09-11 PROCEDURE — 80061 LIPID PANEL: CPT

## 2024-09-11 PROCEDURE — 80053 COMPREHEN METABOLIC PANEL: CPT

## 2024-09-11 RX ORDER — ATORVASTATIN CALCIUM 40 MG/1
40 TABLET, FILM COATED ORAL DAILY
Qty: 90 TABLET | Refills: 3 | Status: SHIPPED | OUTPATIENT
Start: 2024-09-11

## 2024-09-11 RX ORDER — ICOSAPENT ETHYL 1 G/1
2 CAPSULE ORAL 2 TIMES DAILY WITH MEALS
Qty: 120 CAPSULE | Refills: 3 | Status: SHIPPED | OUTPATIENT
Start: 2024-09-11

## 2024-09-11 NOTE — PROGRESS NOTES
Please advise Mr. Pope is still has significantly elevated triglyceride and cholesterol levels.  Restart taking atorvastatin and I have called in for Vascepa/fish oil .  I will repeat fasting CMP and lipid panel in 1 month.  Let me know if he has any questions.  Thank you

## 2024-10-08 DIAGNOSIS — F41.1 GENERALIZED ANXIETY DISORDER: ICD-10-CM

## 2024-10-08 RX ORDER — ESCITALOPRAM OXALATE 10 MG/1
10 TABLET ORAL DAILY
Qty: 90 TABLET | Refills: 2 | Status: SHIPPED | OUTPATIENT
Start: 2024-10-08

## 2024-10-15 ENCOUNTER — LAB (OUTPATIENT)
Dept: LAB | Facility: HOSPITAL | Age: 54
End: 2024-10-15
Payer: MEDICARE

## 2024-10-15 DIAGNOSIS — E78.00 HYPERCHOLESTEROLEMIA: ICD-10-CM

## 2024-10-15 LAB
ALBUMIN SERPL-MCNC: 4.3 G/DL (ref 3.5–5.2)
ALBUMIN/GLOB SERPL: 1.8 G/DL
ALP SERPL-CCNC: 91 U/L (ref 39–117)
ALT SERPL W P-5'-P-CCNC: 39 U/L (ref 1–41)
ANION GAP SERPL CALCULATED.3IONS-SCNC: 15.4 MMOL/L (ref 5–15)
AST SERPL-CCNC: 26 U/L (ref 1–40)
BILIRUB SERPL-MCNC: 0.4 MG/DL (ref 0–1.2)
BUN SERPL-MCNC: 14 MG/DL (ref 6–20)
BUN/CREAT SERPL: 11.2 (ref 7–25)
CALCIUM SPEC-SCNC: 9.2 MG/DL (ref 8.6–10.5)
CHLORIDE SERPL-SCNC: 103 MMOL/L (ref 98–107)
CHOLEST SERPL-MCNC: 157 MG/DL (ref 0–200)
CO2 SERPL-SCNC: 26.6 MMOL/L (ref 22–29)
CREAT SERPL-MCNC: 1.25 MG/DL (ref 0.76–1.27)
EGFRCR SERPLBLD CKD-EPI 2021: 68 ML/MIN/1.73
GLOBULIN UR ELPH-MCNC: 2.4 GM/DL
GLUCOSE SERPL-MCNC: 97 MG/DL (ref 65–99)
HDLC SERPL-MCNC: 32 MG/DL (ref 40–60)
LDLC SERPL CALC-MCNC: 88 MG/DL (ref 0–100)
LDLC/HDLC SERPL: 2.55 {RATIO}
POTASSIUM SERPL-SCNC: 3.7 MMOL/L (ref 3.5–5.2)
PROT SERPL-MCNC: 6.7 G/DL (ref 6–8.5)
SODIUM SERPL-SCNC: 145 MMOL/L (ref 136–145)
TRIGL SERPL-MCNC: 217 MG/DL (ref 0–150)
VLDLC SERPL-MCNC: 37 MG/DL (ref 5–40)

## 2024-10-15 PROCEDURE — 36415 COLL VENOUS BLD VENIPUNCTURE: CPT

## 2024-10-15 PROCEDURE — 80053 COMPREHEN METABOLIC PANEL: CPT

## 2024-10-15 PROCEDURE — 80061 LIPID PANEL: CPT

## 2024-10-15 NOTE — PROGRESS NOTES
Please notify Santhosh that his cholesterol numbers have improved significantly and liver function test is normal.  Continue current care.  Let me know if he has any questions for me.  Thank you

## 2024-11-13 RX ORDER — MAVACAMTEN 2.5 MG/1
CAPSULE, GELATIN COATED ORAL
Qty: 30 CAPSULE | Refills: 2 | OUTPATIENT
Start: 2024-11-13

## 2024-11-19 ENCOUNTER — HOSPITAL ENCOUNTER (OUTPATIENT)
Dept: CARDIOLOGY | Facility: HOSPITAL | Age: 54
Discharge: HOME | End: 2024-11-19
Payer: MEDICARE

## 2024-11-19 VITALS
SYSTOLIC BLOOD PRESSURE: 137 MMHG | BODY MASS INDEX: 26.59 KG/M2 | HEIGHT: 74 IN | DIASTOLIC BLOOD PRESSURE: 91 MMHG | WEIGHT: 207.2 LBS | HEART RATE: 63 BPM | OXYGEN SATURATION: 98 %

## 2024-11-19 VITALS
BODY MASS INDEX: 26.56 KG/M2 | SYSTOLIC BLOOD PRESSURE: 134 MMHG | DIASTOLIC BLOOD PRESSURE: 84 MMHG | HEART RATE: 70 BPM | HEIGHT: 74 IN | WEIGHT: 207 LBS

## 2024-11-19 DIAGNOSIS — I42.1 HOCM (HYPERTROPHIC OBSTRUCTIVE CARDIOMYOPATHY): Primary | ICD-10-CM

## 2024-11-19 DIAGNOSIS — I42.1 HOCM (HYPERTROPHIC OBSTRUCTIVE CARDIOMYOPATHY): ICD-10-CM

## 2024-11-19 LAB
AORTIC DIMENSIONLESS INDEX: 0.97 (DI)
BH CV ECHO LEFT VENTRICLE BASAL CAVITARY GRADIENT: 5 MMHG
BH CV ECHO LEFT VENTRICLE GLOBAL LONGITUDINAL STRAIN: -19.1 %
BH CV ECHO MEAS - AO MAX PG: 6.3 MMHG
BH CV ECHO MEAS - AO MEAN PG: 3 MMHG
BH CV ECHO MEAS - AO V2 MAX: 125 CM/SEC
BH CV ECHO MEAS - AO V2 VTI: 27.4 CM
BH CV ECHO MEAS - AVA(I,D): 3.8 CM2
BH CV ECHO MEAS - EDV(CUBED): 110.6 ML
BH CV ECHO MEAS - EDV(MOD-SP2): 74 ML
BH CV ECHO MEAS - EDV(MOD-SP4): 101 ML
BH CV ECHO MEAS - EF(MOD-BP): 66.5 %
BH CV ECHO MEAS - EF(MOD-SP2): 71.6 %
BH CV ECHO MEAS - EF(MOD-SP4): 58.4 %
BH CV ECHO MEAS - EF_3D-VOL: 56 %
BH CV ECHO MEAS - ESV(MOD-SP2): 21 ML
BH CV ECHO MEAS - ESV(MOD-SP4): 42 ML
BH CV ECHO MEAS - FS: 34.8 %
BH CV ECHO MEAS - IVS/LVPW: 1 CM
BH CV ECHO MEAS - IVSD: 0.9 CM
BH CV ECHO MEAS - LAT PEAK E' VEL: 6.7 CM/SEC
BH CV ECHO MEAS - LV DIASTOLIC VOL/BSA (35-75): 45.8 CM2
BH CV ECHO MEAS - LV MASS(C)D: 147.8 GRAMS
BH CV ECHO MEAS - LV MAX PG: 5.8 MMHG
BH CV ECHO MEAS - LV MEAN PG: 3.1 MMHG
BH CV ECHO MEAS - LV SYSTOLIC VOL/BSA (12-30): 19 CM2
BH CV ECHO MEAS - LV V1 MAX: 120.6 CM/SEC
BH CV ECHO MEAS - LV V1 VTI: 26.8 CM
BH CV ECHO MEAS - LVIDD: 4.8 CM
BH CV ECHO MEAS - LVIDS: 3.1 CM
BH CV ECHO MEAS - LVOT AREA: 3.9 CM2
BH CV ECHO MEAS - LVOT DIAM: 2.23 CM
BH CV ECHO MEAS - LVPWD: 0.9 CM
BH CV ECHO MEAS - MED PEAK E' VEL: 6.7 CM/SEC
BH CV ECHO MEAS - MR MAX PG: 36.7 MMHG
BH CV ECHO MEAS - MR MAX VEL: 302.9 CM/SEC
BH CV ECHO MEAS - MV A MAX VEL: 75.8 CM/SEC
BH CV ECHO MEAS - MV DEC SLOPE: 298.9 CM/SEC2
BH CV ECHO MEAS - MV DEC TIME: 0.19 SEC
BH CV ECHO MEAS - MV E MAX VEL: 60.4 CM/SEC
BH CV ECHO MEAS - MV E/A: 0.8
BH CV ECHO MEAS - MV MAX PG: 2.47 MMHG
BH CV ECHO MEAS - MV MEAN PG: 1.15 MMHG
BH CV ECHO MEAS - MV P1/2T: 59.6 MSEC
BH CV ECHO MEAS - MV V2 VTI: 20.5 CM
BH CV ECHO MEAS - MVA(P1/2T): 3.7 CM2
BH CV ECHO MEAS - MVA(VTI): 5.1 CM2
BH CV ECHO MEAS - RAP SYSTOLE: 3 MMHG
BH CV ECHO MEAS - RVSP: 9 MMHG
BH CV ECHO MEAS - SV(LVOT): 104.5 ML
BH CV ECHO MEAS - SV(MOD-SP2): 53 ML
BH CV ECHO MEAS - SV(MOD-SP4): 59 ML
BH CV ECHO MEAS - SVI(LVOT): 47.4 ML/M2
BH CV ECHO MEAS - SVI(MOD-SP2): 24 ML/M2
BH CV ECHO MEAS - SVI(MOD-SP4): 26.8 ML/M2
BH CV ECHO MEAS - TR MAX PG: 6.3 MMHG
BH CV ECHO MEAS - TR MAX VEL: 125.5 CM/SEC
BH CV ECHO MEASUREMENTS AVERAGE E/E' RATIO: 9.01
BH CV XLRA - TDI S': 10.7 CM/SEC
LEFT ATRIUM VOLUME INDEX: 27.5 ML/M2

## 2024-11-19 PROCEDURE — 93308 TTE F-UP OR LMTD: CPT | Performed by: INTERNAL MEDICINE

## 2024-11-19 PROCEDURE — 93325 DOPPLER ECHO COLOR FLOW MAPG: CPT | Performed by: INTERNAL MEDICINE

## 2024-11-19 PROCEDURE — 93356 MYOCRD STRAIN IMG SPCKL TRCK: CPT

## 2024-11-19 PROCEDURE — 25510000001 PERFLUTREN 6.52 MG/ML SUSPENSION 2 ML VIAL: Performed by: INTERNAL MEDICINE

## 2024-11-19 PROCEDURE — 93356 MYOCRD STRAIN IMG SPCKL TRCK: CPT | Performed by: INTERNAL MEDICINE

## 2024-11-19 PROCEDURE — G0463 HOSPITAL OUTPT CLINIC VISIT: HCPCS

## 2024-11-19 PROCEDURE — 93308 TTE F-UP OR LMTD: CPT

## 2024-11-19 PROCEDURE — 93325 DOPPLER ECHO COLOR FLOW MAPG: CPT

## 2024-11-19 PROCEDURE — 93321 DOPPLER ECHO F-UP/LMTD STD: CPT | Performed by: INTERNAL MEDICINE

## 2024-11-19 PROCEDURE — 93321 DOPPLER ECHO F-UP/LMTD STD: CPT

## 2024-11-19 RX ADMIN — PERFLUTREN 1 ML: 6.52 INJECTION, SUSPENSION INTRAVENOUS at 13:10

## 2024-11-19 NOTE — LETTER
November 19, 2024     James Yusuf MD PhD  [address] [id number]    Patient: Santhosh Pope   YOB: 1970   Date of Visit: 11/19/2024     Dear James Yusuf MD PhD:       Thank you for referring Santhosh Pope to me for evaluation. Below are the relevant portions of my assessment and plan of care.    If you have questions, please do not hesitate to call me. I look forward to following Santhosh along with you.         Sincerely,        Madeleine Tovar, PharmD        CC: MD Max Lopez MD

## 2024-11-19 NOTE — PROGRESS NOTES
[facility]        Patient Name: Santhosh Pope  :1970  Age: 53 y.o.  Sex: male  Referring Provider: Max Husain MD   Primary Cardiologist: Max Husain MD  Encounter Provider: Madeleine Tovar, GeoffD      HPI:  Patient presents to the HF for their follow-up visit for Camzyos management. 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 The patient was accompanied by wife, and she was present during the appointment.  Patient is feeling improvement in symptoms, and no new side effects to report. The patient recently had his dose of  Depakote increased by neurologist, icosapent ethyl started and atorvastatin resumed by the primary cardiology.     Initiation Phase:    Baseline Echo (10/5/23):   EF 75.5%; VLVOT 57 mmHg   Camzyos started at 5 mg daily      4-wk f/u echo (23):  EF 61-65%; VLVOT 10 mmHg  Camzyos decreased to 2.5 mg daily     8-wk f/u echo (1/10/24):  EF 67.9%; VLVOT 5 mmHg  Camzyos STOPPED     12-wk f/u echo (24):  EF 70.9%; VLVOT 12 mmHg  Camzyos restarted at 2.5 mg daily    Maintenance Phase:       4-wk f/u echo (3/6/24):  EF 68.9 %: VLVOT 7 mmHg  Camzyos continued at 2.5 mg daily      8-wk repeat echo (24):    EF 70.6%; VLVOT 11.6 mmHg    Camzyos continued at 2.5 mg daily    12-wk f/u echo (24):  EF 69.3%; VLVOT 10 mmHg  Camzyos continued at 2.5 mg daily    12-wk f/u echo (24):  EF 55-60%; VLVOT 5 mmHg    Current Medications:    Current Outpatient Medications:     atorvastatin (LIPITOR) 40 MG tablet, Take 1 tablet by mouth Daily., Disp: 90 tablet, Rfl: 3    divalproex (DEPAKOTE) 250 MG DR tablet, Take 2 tablets by mouth. 500 mg  PM until Sat then 500 mg bid, Disp: , Rfl:     escitalopram (LEXAPRO) 10 MG tablet, TAKE 1 TABLET BY MOUTH EVERY DAY, Disp: 90 tablet, Rfl: 2    icosapent ethyl (Vascepa) 1 g capsule capsule, Take 2 g by  "mouth 2 (Two) Times a Day With Meals., Disp: 120 capsule, Rfl: 3    levETIRAcetam (KEPPRA) 500 MG tablet, Take 2 tablets by mouth 2 (Two) Times a Day., Disp: , Rfl:     LORazepam (ATIVAN) 0.5 MG tablet, Take 1 tablet by mouth As Needed for Anxiety., Disp: , Rfl:     Mavacamten 2.5 MG capsule, Take 2.5 mg by mouth Daily., Disp: 30 capsule, Rfl: 2    metoprolol succinate XL (TOPROL-XL) 50 MG 24 hr tablet, Take 1 tablet by mouth Daily., Disp: 90 tablet, Rfl: 3    multivitamin with minerals tablet tablet, Take 1 tablet by mouth Daily., Disp: , Rfl:     Nayzilam 5 MG/0.1ML solution, 1 spray into the nostril(s) as directed by provider., Disp: , Rfl:     pantoprazole (Protonix) 40 MG EC tablet, Take 1 tablet by mouth Daily., Disp: , Rfl:     Current Facility-Administered Medications:     nitroglycerin (NITROSTAT) SL tablet 0.4 mg, 0.4 mg, Sublingual, Q5 Min PRN, Max Husain MD    sodium chloride 0.9 % flush 10 mL, 10 mL, Intravenous, PRN, Max Husain MD      OBJECTIVE    /91 (BP Location: Left arm, Patient Position: Sitting)   Pulse 63   Ht 188 cm (74.02\")   Wt 94 kg (207 lb 3.2 oz)   SpO2 98%   BMI 26.59 kg/m²     Body mass index is 26.59 kg/m².  Wt Readings from Last 1 Encounters:   11/19/24 94 kg (207 lb 3.2 oz)       Echo Results:  Results for orders placed during the hospital encounter of 11/19/24    Adult Transthoracic Echo Limited W/ Cont if Necessary Per Protocol    Interpretation Summary    Limited echocardiogram mavacamten    Left ventricular systolic function is normal. Left ventricular ejection fraction appears to be 56 - 60%. Septal wall motion is normal. Normal global longitudinal LV strain (GLS) = -19.1%. Left ventricular wall thickness is consistent with mild to moderate septal asymmetric hypertrophy.    Left ventricular intracavitary gradient noted to be 5 mmHg.    Normal right ventricular cavity size, systolic function and septal motion noted. Right ventricular wall " thickness is consistent with mild hypertrophy.      ASSESSMENT/PLAN OF CARE:    HOCM  The patient is feeling well today and mentioned significant improvement in symptoms since start of camzyos medication. He reports no new side effects of concern (ex. Dizziness, syncope)  Patient did have depakote dose increased this week, icosapent ethyl and atorvastatin restarted since the last appointment with pharmacy.   12-wk repeat TTE shows EF of 56-60% and VLVOT of 5 mmHg  Per dosing algorithm below, Continue Camzyos 2.5 mg daily.   Repeat TTE in 12 weeks.  Drug interactions reviewed. No new drug interactions identified. Reminded patient to call before starting any new RX/OTC/herbals/vitamins/supplements.   Continue metoprolol succinate 50 mg by mouth daily  Camzyos LEI completed            Thank you for allowing me to participate in the care of your patient,       Madeleine Tovar [facility]  11/19/24  14:42 EST

## 2024-11-25 RX ORDER — METOPROLOL SUCCINATE 50 MG/1
50 TABLET, EXTENDED RELEASE ORAL DAILY
Qty: 90 TABLET | Refills: 3 | Status: SHIPPED | OUTPATIENT
Start: 2024-11-25

## 2024-11-25 RX ORDER — ICOSAPENT ETHYL 1 G/1
CAPSULE ORAL
Qty: 360 CAPSULE | Refills: 0 | Status: SHIPPED | OUTPATIENT
Start: 2024-11-25

## 2025-01-28 ENCOUNTER — TRANSCRIBE ORDERS (OUTPATIENT)
Dept: ADMINISTRATIVE | Facility: HOSPITAL | Age: 55
End: 2025-01-28
Payer: MEDICARE

## 2025-01-28 ENCOUNTER — LAB (OUTPATIENT)
Dept: LAB | Facility: HOSPITAL | Age: 55
End: 2025-01-28
Payer: MEDICARE

## 2025-01-28 DIAGNOSIS — G40.209 COMPLEX PARTIAL SEIZURES WITH CONSCIOUSNESS IMPAIRED: Primary | ICD-10-CM

## 2025-01-28 DIAGNOSIS — G40.209 COMPLEX PARTIAL SEIZURES WITH CONSCIOUSNESS IMPAIRED: ICD-10-CM

## 2025-01-28 LAB
ALBUMIN SERPL-MCNC: 4.2 G/DL (ref 3.5–5.2)
ALBUMIN/GLOB SERPL: 1.8 G/DL
ALP SERPL-CCNC: 83 U/L (ref 39–117)
ALT SERPL W P-5'-P-CCNC: 53 U/L (ref 1–41)
ANION GAP SERPL CALCULATED.3IONS-SCNC: 10 MMOL/L (ref 5–15)
AST SERPL-CCNC: 31 U/L (ref 1–40)
BASOPHILS # BLD AUTO: 0.04 10*3/MM3 (ref 0–0.2)
BASOPHILS NFR BLD AUTO: 0.4 % (ref 0–1.5)
BILIRUB SERPL-MCNC: 0.3 MG/DL (ref 0–1.2)
BUN SERPL-MCNC: 13 MG/DL (ref 6–20)
BUN/CREAT SERPL: 12 (ref 7–25)
CALCIUM SPEC-SCNC: 9.4 MG/DL (ref 8.6–10.5)
CHLORIDE SERPL-SCNC: 107 MMOL/L (ref 98–107)
CO2 SERPL-SCNC: 29 MMOL/L (ref 22–29)
CREAT SERPL-MCNC: 1.08 MG/DL (ref 0.76–1.27)
DEPRECATED RDW RBC AUTO: 41.6 FL (ref 37–54)
EGFRCR SERPLBLD CKD-EPI 2021: 81 ML/MIN/1.73
EOSINOPHIL # BLD AUTO: 0.2 10*3/MM3 (ref 0–0.4)
EOSINOPHIL NFR BLD AUTO: 2.2 % (ref 0.3–6.2)
ERYTHROCYTE [DISTWIDTH] IN BLOOD BY AUTOMATED COUNT: 12.7 % (ref 12.3–15.4)
GLOBULIN UR ELPH-MCNC: 2.3 GM/DL
GLUCOSE SERPL-MCNC: 81 MG/DL (ref 65–99)
HCT VFR BLD AUTO: 44.6 % (ref 37.5–51)
HGB BLD-MCNC: 14.7 G/DL (ref 13–17.7)
IMM GRANULOCYTES # BLD AUTO: 0.02 10*3/MM3 (ref 0–0.05)
IMM GRANULOCYTES NFR BLD AUTO: 0.2 % (ref 0–0.5)
LYMPHOCYTES # BLD AUTO: 2.41 10*3/MM3 (ref 0.7–3.1)
LYMPHOCYTES NFR BLD AUTO: 27 % (ref 19.6–45.3)
MCH RBC QN AUTO: 29.5 PG (ref 26.6–33)
MCHC RBC AUTO-ENTMCNC: 33 G/DL (ref 31.5–35.7)
MCV RBC AUTO: 89.4 FL (ref 79–97)
MONOCYTES # BLD AUTO: 0.73 10*3/MM3 (ref 0.1–0.9)
MONOCYTES NFR BLD AUTO: 8.2 % (ref 5–12)
NEUTROPHILS NFR BLD AUTO: 5.54 10*3/MM3 (ref 1.7–7)
NEUTROPHILS NFR BLD AUTO: 62 % (ref 42.7–76)
NRBC BLD AUTO-RTO: 0 /100 WBC (ref 0–0.2)
PLATELET # BLD AUTO: 281 10*3/MM3 (ref 140–450)
PMV BLD AUTO: 10.5 FL (ref 6–12)
POTASSIUM SERPL-SCNC: 4.9 MMOL/L (ref 3.5–5.2)
PROT SERPL-MCNC: 6.5 G/DL (ref 6–8.5)
RBC # BLD AUTO: 4.99 10*6/MM3 (ref 4.14–5.8)
SODIUM SERPL-SCNC: 146 MMOL/L (ref 136–145)
VALPROATE SERPL-MCNC: 50 MCG/ML (ref 50–125)
WBC NRBC COR # BLD AUTO: 8.94 10*3/MM3 (ref 3.4–10.8)

## 2025-01-28 PROCEDURE — 80053 COMPREHEN METABOLIC PANEL: CPT

## 2025-01-28 PROCEDURE — 85025 COMPLETE CBC W/AUTO DIFF WBC: CPT

## 2025-01-28 PROCEDURE — 80164 ASSAY DIPROPYLACETIC ACD TOT: CPT

## 2025-01-28 PROCEDURE — 36415 COLL VENOUS BLD VENIPUNCTURE: CPT

## 2025-02-03 ENCOUNTER — TELEPHONE (OUTPATIENT)
Dept: CARDIOLOGY | Facility: HOSPITAL | Age: 55
End: 2025-02-03
Payer: MEDICARE

## 2025-02-03 NOTE — TELEPHONE ENCOUNTER
Spoke to Indira. Advised her to call [employer] ([phone]). If we need to reapply we can go down that route.    Anca Holguin, PharmD, BCACP  [facility]  Phone: [phone]

## 2025-02-03 NOTE — TELEPHONE ENCOUNTER
----- Message from Alicia HOLT sent at 2/3/2025  3:24 PM EST -----  Regarding: [facility]  Patient's wife Indira called & Left a . She reports that she called to re-order his Camzyos. She was told that they needed their new [facility] glynn ID number.    Indira would like a call back at [phone]

## 2025-02-04 NOTE — TELEPHONE ENCOUNTER
Spoke to Indira. I called [employer] and got patient's ID number: [id number]. Emailed Indira information on how to create a patient portal and reapply for glynn funding.    Anca Holguin, PharmD, BCACP  [facility]  Phone: [phone]

## 2025-02-06 ENCOUNTER — TELEPHONE (OUTPATIENT)
Dept: CARDIOLOGY | Facility: HOSPITAL | Age: 55
End: 2025-02-06
Payer: MEDICARE

## 2025-02-06 RX ORDER — ICOSAPENT ETHYL 1 G/1
CAPSULE ORAL
Qty: 360 CAPSULE | Refills: 1 | Status: SHIPPED | OUTPATIENT
Start: 2025-02-06

## 2025-02-06 NOTE — TELEPHONE ENCOUNTER
Spoke to Indira. No other grants are open that I am aware of. Advised her to call the [facility] team and ask to enroll him into their patient assistance program. Emailed Indira ([email or web address]) with phone number and directions.    Anca Holguin, PharmD, BCACP  [facility]  Phone: [phone]

## 2025-02-06 NOTE — TELEPHONE ENCOUNTER
----- Message from Alicia HOLT sent at 2/5/2025  1:42 PM EST -----  Regarding: Call  Patient's wife Indira called while you were in a patient room. She states that patient can not re-apply for the [employer] glynn until July 17, 2025.    She is asking if there are any other options for them, she states they do not have $2000 right now to pay for his Camzyos.    Indira's call back is [phone]    Thanks,  Alicia HOLT [facility]

## 2025-02-11 ENCOUNTER — HOSPITAL ENCOUNTER (OUTPATIENT)
Dept: CARDIOLOGY | Facility: HOSPITAL | Age: 55
Discharge: HOME | End: 2025-02-11
Payer: MEDICARE

## 2025-02-11 VITALS
WEIGHT: 213 LBS | SYSTOLIC BLOOD PRESSURE: 138 MMHG | HEIGHT: 74 IN | BODY MASS INDEX: 27.34 KG/M2 | HEART RATE: 67 BPM | DIASTOLIC BLOOD PRESSURE: 89 MMHG | OXYGEN SATURATION: 97 %

## 2025-02-11 VITALS
SYSTOLIC BLOOD PRESSURE: 140 MMHG | HEART RATE: 70 BPM | HEIGHT: 74 IN | BODY MASS INDEX: 26.56 KG/M2 | DIASTOLIC BLOOD PRESSURE: 88 MMHG | WEIGHT: 207 LBS

## 2025-02-11 DIAGNOSIS — I42.1 HOCM (HYPERTROPHIC OBSTRUCTIVE CARDIOMYOPATHY): Primary | ICD-10-CM

## 2025-02-11 DIAGNOSIS — I42.1 HOCM (HYPERTROPHIC OBSTRUCTIVE CARDIOMYOPATHY): ICD-10-CM

## 2025-02-11 LAB
AORTIC DIMENSIONLESS INDEX: 0.97 (DI)
AV MEAN PRESS GRAD SYS DOP V1V2: 3 MMHG
AV VMAX SYS DOP: 126 CM/SEC
BH CV ECHO LEFT VENTRICLE BASAL CAVITARY GRADIENT: 10 MMHG
BH CV ECHO MEAS - AO MAX PG: 6.4 MMHG
BH CV ECHO MEAS - AO V2 VTI: 29.5 CM
BH CV ECHO MEAS - AVA(I,D): 3.3 CM2
BH CV ECHO MEAS - EDV(CUBED): 126.5 ML
BH CV ECHO MEAS - EDV(MOD-SP2): 83 ML
BH CV ECHO MEAS - EDV(MOD-SP4): 108 ML
BH CV ECHO MEAS - EF(MOD-SP2): 73.5 %
BH CV ECHO MEAS - EF(MOD-SP4): 69.4 %
BH CV ECHO MEAS - ESV(MOD-SP2): 22 ML
BH CV ECHO MEAS - ESV(MOD-SP4): 33 ML
BH CV ECHO MEAS - FS: 27.2 %
BH CV ECHO MEAS - IVS/LVPW: 1.08 CM
BH CV ECHO MEAS - IVSD: 0.89 CM
BH CV ECHO MEAS - LV DIASTOLIC VOL/BSA (35-75): 49 CM2
BH CV ECHO MEAS - LV MASS(C)D: 149.8 GRAMS
BH CV ECHO MEAS - LV MAX PG: 7.6 MMHG
BH CV ECHO MEAS - LV MEAN PG: 4 MMHG
BH CV ECHO MEAS - LV SYSTOLIC VOL/BSA (12-30): 15 CM2
BH CV ECHO MEAS - LV V1 MAX: 138 CM/SEC
BH CV ECHO MEAS - LV V1 VTI: 28.7 CM
BH CV ECHO MEAS - LVIDD: 5 CM
BH CV ECHO MEAS - LVIDS: 3.7 CM
BH CV ECHO MEAS - LVOT AREA: 3.4 CM2
BH CV ECHO MEAS - LVOT DIAM: 2.08 CM
BH CV ECHO MEAS - LVPWD: 0.83 CM
BH CV ECHO MEAS - MR MAX PG: 107.4 MMHG
BH CV ECHO MEAS - MR MAX VEL: 518.3 CM/SEC
BH CV ECHO MEAS - MV A DUR: 0.14 SEC
BH CV ECHO MEAS - MV A MAX VEL: 77.1 CM/SEC
BH CV ECHO MEAS - MV DEC SLOPE: 426.5 CM/SEC2
BH CV ECHO MEAS - MV DEC TIME: 0.17 SEC
BH CV ECHO MEAS - MV E MAX VEL: 75.5 CM/SEC
BH CV ECHO MEAS - MV E/A: 0.98
BH CV ECHO MEAS - MV MAX PG: 2.35 MMHG
BH CV ECHO MEAS - MV MEAN PG: 1.1 MMHG
BH CV ECHO MEAS - MV P1/2T: 47.5 MSEC
BH CV ECHO MEAS - MV V2 VTI: 22.6 CM
BH CV ECHO MEAS - MVA(P1/2T): 4.6 CM2
BH CV ECHO MEAS - MVA(VTI): 4.3 CM2
BH CV ECHO MEAS - PA V2 MAX: 86.9 CM/SEC
BH CV ECHO MEAS - PULM A REVS DUR: 0.13 SEC
BH CV ECHO MEAS - PULM A REVS VEL: 28.8 CM/SEC
BH CV ECHO MEAS - PULM DIAS VEL: 28.8 CM/SEC
BH CV ECHO MEAS - PULM S/D: 1.31
BH CV ECHO MEAS - PULM SYS VEL: 37.7 CM/SEC
BH CV ECHO MEAS - RAP SYSTOLE: 3 MMHG
BH CV ECHO MEAS - RVSP: 17 MMHG
BH CV ECHO MEAS - SV(LVOT): 97.9 ML
BH CV ECHO MEAS - SV(MOD-SP2): 61 ML
BH CV ECHO MEAS - SV(MOD-SP4): 75 ML
BH CV ECHO MEAS - SVI(LVOT): 44.4 ML/M2
BH CV ECHO MEAS - SVI(MOD-SP2): 27.7 ML/M2
BH CV ECHO MEAS - SVI(MOD-SP4): 34 ML/M2
BH CV ECHO MEAS - TR MAX PG: 14.2 MMHG
BH CV ECHO MEAS - TR MAX VEL: 188.2 CM/SEC
LEFT ATRIUM VOLUME INDEX: 29.7 ML/M2
LV EF 3D SEGMENTATION: 63 %
LV EF BIPLANE MOD: 71.7 %

## 2025-02-11 PROCEDURE — 93321 DOPPLER ECHO F-UP/LMTD STD: CPT

## 2025-02-11 PROCEDURE — 93308 TTE F-UP OR LMTD: CPT

## 2025-02-11 PROCEDURE — 93356 MYOCRD STRAIN IMG SPCKL TRCK: CPT

## 2025-02-11 PROCEDURE — G0463 HOSPITAL OUTPT CLINIC VISIT: HCPCS

## 2025-02-11 PROCEDURE — 93325 DOPPLER ECHO COLOR FLOW MAPG: CPT

## 2025-02-11 PROCEDURE — 25510000001 PERFLUTREN 6.52 MG/ML SUSPENSION 2 ML VIAL: Performed by: INTERNAL MEDICINE

## 2025-02-11 RX ADMIN — PERFLUTREN 1 ML: 6.52 INJECTION, SUSPENSION INTRAVENOUS at 13:03

## 2025-02-11 NOTE — LETTER
2025     James Yusuf MD PhD  [address]  [place] [id number]    Patient: Santhosh Pope   YOB: 1970   Date of Visit: 2025     Dear Dr. Madelin MD PhD:    Thank you for referring Santhosh Pope to me for evaluation. Below are the relevant portions of my assessment and plan of care.    If you have questions, please do not hesitate to call me. I look forward to following Santhosh along with you.         Sincerely,        PHARMACIST [facility] HEART FAIL        CC: MD Billy Frankel MD      Progress Notes:  [facility]        Patient Name: Santhosh Pope  :1970  Age: 53 y.o.  Sex: male  Referring Provider: Dr. Madelin MD  Primary Cardiologist: Max Husain MD  Encounter Provider: Geoff McallisterD, BCACP      HPI:  Patient presents to the HFC for their follow-up visit for Camzyos management. 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 The patient was accompanied by wife, and she was present during the appointment. He continues to tolerate Camzyos well. No side effects reported. Symptoms have improved overall since initiation of Camzyos.      Initiation Phase:    Baseline Echo (10/5/23):   EF 75.5%; VLVOT 57 mmHg   Camzyos started at 5 mg daily      4-wk f/u echo (23):  EF 61-65%; VLVOT 10 mmHg  Camzyos decreased to 2.5 mg daily     8-wk f/u echo (1/10/24):  EF 67.9%; VLVOT 5 mmHg  Camzyos STOPPED     12-wk f/u echo (24):  EF 70.9%; VLVOT 12 mmHg  Camzyos restarted at 2.5 mg daily    Maintenance Phase:       4-wk f/u echo (3/6/24):  EF 68.9 %: VLVOT 7 mmHg  Camzyos continued at 2.5 mg daily      8-wk repeat echo (24):    EF 70.6%; VLVOT 11.6 mmHg    Camzyos continued at 2.5 mg daily    12-wk f/u echo (24):  EF 69.3%; VLVOT 10 mmHg  Camzyos continued at 2.5 mg daily    12-wk f/u  "echo (11/19/24):  EF 55-60%; VLVOT 5 mmHg  Camzyos continued at 2.5 mg daily    12-wk f/u echo (2/11/25):  EF 63%; VLVOT 10 mmHg    Current Medications:    Current Outpatient Medications:   •  atorvastatin (LIPITOR) 40 MG tablet, Take 1 tablet by mouth Daily., Disp: 90 tablet, Rfl: 3  •  divalproex (DEPAKOTE) 250 MG DR tablet, Take 2 tablets by mouth. 500 mg  PM until Sat then 500 mg bid, Disp: , Rfl:   •  escitalopram (LEXAPRO) 10 MG tablet, TAKE 1 TABLET BY MOUTH EVERY DAY, Disp: 90 tablet, Rfl: 2  •  icosapent ethyl (VASCEPA) 1 g capsule capsule, TAKE 2 CAPSULES (2GRAMS)   TWO TIMES A DAY WITH MEALS, Disp: 360 capsule, Rfl: 1  •  levETIRAcetam (KEPPRA) 500 MG tablet, Take 2 tablets by mouth 2 (Two) Times a Day., Disp: , Rfl:   •  LORazepam (ATIVAN) 0.5 MG tablet, Take 1 tablet by mouth As Needed for Anxiety., Disp: , Rfl:   •  Mavacamten 2.5 MG capsule, Take 2.5 mg by mouth Daily., Disp: 30 capsule, Rfl: 2  •  metoprolol succinate XL (TOPROL-XL) 50 MG 24 hr tablet, TAKE 1 TABLET DAILY, Disp: 90 tablet, Rfl: 3  •  multivitamin with minerals tablet tablet, Take 1 tablet by mouth Daily., Disp: , Rfl:   •  Nayzilam 5 MG/0.1ML solution, 1 spray into the nostril(s) as directed by provider., Disp: , Rfl:   •  pantoprazole (Protonix) 40 MG EC tablet, Take 1 tablet by mouth Daily., Disp: , Rfl:     Current Facility-Administered Medications:   •  nitroglycerin (NITROSTAT) SL tablet 0.4 mg, 0.4 mg, Sublingual, Q5 Min PRN, Max Husain MD  •  sodium chloride 0.9 % flush 10 mL, 10 mL, Intravenous, PRN, Max Husain MD      OBJECTIVE    /89 (BP Location: Left arm, Patient Position: Sitting)   Pulse 67   Ht 188 cm (74.02\")   Wt 96.6 kg (213 lb)   SpO2 97%   BMI 27.34 kg/m²     Body mass index is 27.34 kg/m².  Wt Readings from Last 1 Encounters:   02/11/25 96.6 kg (213 lb)       Echo Results:  Results for orders placed during the hospital encounter of 02/11/25    Adult Transthoracic Echo " Limited W/ Cont if Necessary Per Protocol    Interpretation Summary  •  Limited echocardiogram mavacamten  •  Left ventricular systolic function is normal. Calculated left ventricular 3D EF = 63%.  Mild septal asymmetric hypertrophy.  Indeterminate diastolic function.  •  Left ventricular intracavitary gradient noted to be 10 mmHg.  •  Normal right ventricular cavity size, systolic function and septal motion noted. Right ventricular wall thickness is consistent with moderate hypertrophy.  •  No evidence of pulmonary hypertension is present.      ASSESSMENT/PLAN OF CARE:    HOCM  Patient continues to tolerate Camzyos and symptoms have improved  12-wk repeat TTE shows EF of 63% and VLVOT of 10 mmHg  Per dosing algorithm below, Continue Camzyos 2.5 mg daily.   Repeat TTE in 12 weeks.  Drug interactions reviewed. No new drug interactions identified. Reminded patient to call before starting any new RX/OTC/herbals/vitamins/supplements.   Camzyos REMS completed  Continue metoprolol succinate 50 mg by mouth daily            Thank you for allowing me to participate in the care of your patient,       Anca Holguin, Jason  [facility]  02/11/25  14:42 EST

## 2025-02-11 NOTE — PROGRESS NOTES
[facility]        Patient Name: Santhosh Pope  :1970  Age: 53 y.o.  Sex: male  Referring Provider: Dr. Madelin MD  Primary Cardiologist: Max Husain MD  Encounter Provider: Anca Holguin PharmD, BCACP      HPI:  Patient presents to the HF for their follow-up visit for Camzyos management. 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 The patient was accompanied by wife, and she was present during the appointment. He continues to tolerate Camzyos well. No side effects reported. Symptoms have improved overall since initiation of Camzyos.      Initiation Phase:    Baseline Echo (10/5/23):   EF 75.5%; VLVOT 57 mmHg   Camzyos started at 5 mg daily      4-wk f/u echo (23):  EF 61-65%; VLVOT 10 mmHg  Camzyos decreased to 2.5 mg daily     8-wk f/u echo (1/10/24):  EF 67.9%; VLVOT 5 mmHg  Camzyos STOPPED     12-wk f/u echo (24):  EF 70.9%; VLVOT 12 mmHg  Camzyos restarted at 2.5 mg daily    Maintenance Phase:       4-wk f/u echo (3/6/24):  EF 68.9 %: VLVOT 7 mmHg  Camzyos continued at 2.5 mg daily      8-wk repeat echo (24):    EF 70.6%; VLVOT 11.6 mmHg    Camzyos continued at 2.5 mg daily    12-wk f/u echo (24):  EF 69.3%; VLVOT 10 mmHg  Camzyos continued at 2.5 mg daily    12-wk f/u echo (24):  EF 55-60%; VLVOT 5 mmHg  Camzyos continued at 2.5 mg daily    12-wk f/u echo (25):  EF 63%; VLVOT 10 mmHg    Current Medications:    Current Outpatient Medications:     atorvastatin (LIPITOR) 40 MG tablet, Take 1 tablet by mouth Daily., Disp: 90 tablet, Rfl: 3    divalproex (DEPAKOTE) 250 MG DR tablet, Take 2 tablets by mouth. 500 mg  PM until Sat then 500 mg bid, Disp: , Rfl:     escitalopram (LEXAPRO) 10 MG tablet, TAKE 1 TABLET BY MOUTH EVERY DAY, Disp: 90 tablet, Rfl: 2    icosapent ethyl (VASCEPA) 1 g capsule capsule, TAKE 2 CAPSULES (2GRAMS)   TWO  "TIMES A DAY WITH MEALS, Disp: 360 capsule, Rfl: 1    levETIRAcetam (KEPPRA) 500 MG tablet, Take 2 tablets by mouth 2 (Two) Times a Day., Disp: , Rfl:     LORazepam (ATIVAN) 0.5 MG tablet, Take 1 tablet by mouth As Needed for Anxiety., Disp: , Rfl:     Mavacamten 2.5 MG capsule, Take 2.5 mg by mouth Daily., Disp: 30 capsule, Rfl: 2    metoprolol succinate XL (TOPROL-XL) 50 MG 24 hr tablet, TAKE 1 TABLET DAILY, Disp: 90 tablet, Rfl: 3    multivitamin with minerals tablet tablet, Take 1 tablet by mouth Daily., Disp: , Rfl:     Nayzilam 5 MG/0.1ML solution, 1 spray into the nostril(s) as directed by provider., Disp: , Rfl:     pantoprazole (Protonix) 40 MG EC tablet, Take 1 tablet by mouth Daily., Disp: , Rfl:     Current Facility-Administered Medications:     nitroglycerin (NITROSTAT) SL tablet 0.4 mg, 0.4 mg, Sublingual, Q5 Min PRN, Max Husain MD    sodium chloride 0.9 % flush 10 mL, 10 mL, Intravenous, PRN, Max Husain MD      OBJECTIVE    /89 (BP Location: Left arm, Patient Position: Sitting)   Pulse 67   Ht 188 cm (74.02\")   Wt 96.6 kg (213 lb)   SpO2 97%   BMI 27.34 kg/m²     Body mass index is 27.34 kg/m².  Wt Readings from Last 1 Encounters:   02/11/25 96.6 kg (213 lb)       Echo Results:  Results for orders placed during the hospital encounter of 02/11/25    Adult Transthoracic Echo Limited W/ Cont if Necessary Per Protocol    Interpretation Summary    Limited echocardiogram mavacamten    Left ventricular systolic function is normal. Calculated left ventricular 3D EF = 63%.  Mild septal asymmetric hypertrophy.  Indeterminate diastolic function.    Left ventricular intracavitary gradient noted to be 10 mmHg.    Normal right ventricular cavity size, systolic function and septal motion noted. Right ventricular wall thickness is consistent with moderate hypertrophy.    No evidence of pulmonary hypertension is present.      ASSESSMENT/PLAN OF CARE:    HOCM  Patient continues to " tolerate Camzyos and symptoms have improved  12-wk repeat TTE shows EF of 63% and VLVOT of 10 mmHg  Per dosing algorithm below, Continue Camzyos 2.5 mg daily.   Repeat TTE in 12 weeks.  Drug interactions reviewed. No new drug interactions identified. Reminded patient to call before starting any new RX/OTC/herbals/vitamins/supplements.   Camzyos REMS completed  Continue metoprolol succinate 50 mg by mouth daily            Thank you for allowing me to participate in the care of your patient,       Anca Holguin, PharmD  [facility]  02/11/25  14:42 EST

## 2025-02-12 DIAGNOSIS — I42.1 HOCM (HYPERTROPHIC OBSTRUCTIVE CARDIOMYOPATHY): Primary | ICD-10-CM

## 2025-02-26 RX ORDER — ICOSAPENT ETHYL 1 G/1
2 CAPSULE ORAL 2 TIMES DAILY WITH MEALS
COMMUNITY
End: 2025-02-26 | Stop reason: SDUPTHER

## 2025-02-26 RX ORDER — ICOSAPENT ETHYL 1 G/1
CAPSULE ORAL
Qty: 120 CAPSULE | Refills: 1 | Status: SHIPPED | OUTPATIENT
Start: 2025-02-26

## 2025-03-08 DIAGNOSIS — F41.1 GENERALIZED ANXIETY DISORDER: ICD-10-CM

## 2025-03-08 RX ORDER — LORAZEPAM 0.5 MG/1
0.5 TABLET ORAL AS NEEDED
OUTPATIENT
Start: 2025-03-08

## 2025-03-08 RX ORDER — ESCITALOPRAM OXALATE 10 MG/1
10 TABLET ORAL DAILY
Qty: 90 TABLET | Refills: 2 | OUTPATIENT
Start: 2025-03-08

## 2025-03-10 RX ORDER — PANTOPRAZOLE SODIUM 40 MG/1
40 TABLET, DELAYED RELEASE ORAL DAILY
Start: 2025-03-10

## 2025-03-26 RX ORDER — PANTOPRAZOLE SODIUM 40 MG/1
40 TABLET, DELAYED RELEASE ORAL DAILY
Qty: 90 TABLET | OUTPATIENT
Start: 2025-03-26

## 2025-04-28 RX ORDER — ICOSAPENT ETHYL 1 G/1
CAPSULE ORAL
Qty: 120 CAPSULE | Refills: 1 | Status: SHIPPED | OUTPATIENT
Start: 2025-04-28

## 2025-05-05 ENCOUNTER — HOSPITAL ENCOUNTER (OUTPATIENT)
Dept: CARDIOLOGY | Facility: HOSPITAL | Age: 55
Discharge: HOME | End: 2025-05-05
Payer: MEDICARE

## 2025-05-05 VITALS
HEART RATE: 63 BPM | BODY MASS INDEX: 27.87 KG/M2 | DIASTOLIC BLOOD PRESSURE: 97 MMHG | WEIGHT: 217.2 LBS | SYSTOLIC BLOOD PRESSURE: 152 MMHG | OXYGEN SATURATION: 97 % | HEIGHT: 74 IN

## 2025-05-05 VITALS
HEIGHT: 74 IN | BODY MASS INDEX: 27.34 KG/M2 | WEIGHT: 213 LBS | HEART RATE: 74 BPM | OXYGEN SATURATION: 97 % | SYSTOLIC BLOOD PRESSURE: 150 MMHG | DIASTOLIC BLOOD PRESSURE: 92 MMHG

## 2025-05-05 DIAGNOSIS — I42.1 HOCM (HYPERTROPHIC OBSTRUCTIVE CARDIOMYOPATHY): ICD-10-CM

## 2025-05-05 DIAGNOSIS — I42.1 HOCM (HYPERTROPHIC OBSTRUCTIVE CARDIOMYOPATHY): Primary | ICD-10-CM

## 2025-05-05 LAB
AV VMAX SYS DOP: 154.7 CM/SEC
BH CV ECHO LEFT VENTRICLE GLOBAL LONGITUDINAL STRAIN: -19 %
BH CV ECHO LEFT VENTRICLE MID CAVITARY GRADIENT: 9 MMHG
BH CV ECHO MEAS - AO MAX PG: 9.6 MMHG
BH CV ECHO MEAS - EDV(MOD-SP2): 72 ML
BH CV ECHO MEAS - EDV(MOD-SP4): 60 ML
BH CV ECHO MEAS - EF(MOD-SP2): 63.9 %
BH CV ECHO MEAS - EF(MOD-SP4): 60 %
BH CV ECHO MEAS - ESV(MOD-SP2): 26 ML
BH CV ECHO MEAS - ESV(MOD-SP4): 24 ML
BH CV ECHO MEAS - LV DIASTOLIC VOL/BSA (35-75): 26.6 CM2
BH CV ECHO MEAS - LV MAX PG: 8.8 MMHG
BH CV ECHO MEAS - LV SYSTOLIC VOL/BSA (12-30): 10.6 CM2
BH CV ECHO MEAS - LV V1 MAX: 147.9 CM/SEC
BH CV ECHO MEAS - SV(MOD-SP2): 46 ML
BH CV ECHO MEAS - SV(MOD-SP4): 36 ML
BH CV ECHO MEAS - SVI(MOD-SP2): 20.4 ML/M2
BH CV ECHO MEAS - SVI(MOD-SP4): 16 ML/M2
LV EF BIPLANE MOD: 61.2 %

## 2025-05-05 PROCEDURE — 93308 TTE F-UP OR LMTD: CPT | Performed by: INTERNAL MEDICINE

## 2025-05-05 PROCEDURE — 99214 OFFICE O/P EST MOD 30 MIN: CPT | Performed by: NURSE PRACTITIONER

## 2025-05-05 PROCEDURE — 25510000001 PERFLUTREN 6.52 MG/ML SUSPENSION 2 ML VIAL: Performed by: INTERNAL MEDICINE

## 2025-05-05 PROCEDURE — 93325 DOPPLER ECHO COLOR FLOW MAPG: CPT | Performed by: INTERNAL MEDICINE

## 2025-05-05 PROCEDURE — 93325 DOPPLER ECHO COLOR FLOW MAPG: CPT

## 2025-05-05 PROCEDURE — G0463 HOSPITAL OUTPT CLINIC VISIT: HCPCS

## 2025-05-05 PROCEDURE — 93321 DOPPLER ECHO F-UP/LMTD STD: CPT

## 2025-05-05 PROCEDURE — 93356 MYOCRD STRAIN IMG SPCKL TRCK: CPT

## 2025-05-05 PROCEDURE — 93356 MYOCRD STRAIN IMG SPCKL TRCK: CPT | Performed by: INTERNAL MEDICINE

## 2025-05-05 PROCEDURE — 93308 TTE F-UP OR LMTD: CPT

## 2025-05-05 PROCEDURE — 93321 DOPPLER ECHO F-UP/LMTD STD: CPT | Performed by: INTERNAL MEDICINE

## 2025-05-05 RX ORDER — DIVALPROEX SODIUM 250 MG/1
TABLET, DELAYED RELEASE ORAL
COMMUNITY
Start: 2025-04-28

## 2025-05-05 RX ADMIN — PERFLUTREN 2 ML: 6.52 INJECTION, SUSPENSION INTRAVENOUS at 10:02

## 2025-05-05 NOTE — PATIENT INSTRUCTIONS
Start keeping track of your blood pressure and keep it on a log    Follow-up with Dr Husain as well--call to reschedule the appointment.     Directions for when to call the clinic reviewed with the patient to include weight gain of 2 to 3 pounds in 24 hours, weight gain of 5 to 10 pounds within 7 days; worsening shortness of breath; worsening lower extremity edema or abdominal distention.    Continue the camzyos.     Clinical visit follow-up for recheck in 3 months, but echo can be pushed out to 6 months.

## 2025-05-05 NOTE — PROGRESS NOTES
[facility]      History of Present Illness    1. HOCM    Subjective      Santhosh Umana a 55 y.o. male who presents today for HOCM.     [facility] for their follow-up visit for Camzyos management. 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 The patient was accompanied by wife, and she was present during the appointment. He continues to tolerate Camzyos well. No side effects reported. Symptoms have improved overall since initiation of Camzyos.       Initiation Phase:     Baseline Echo (10/5/23):   EF 75.5%; VLVOT 57 mmHg   Camzyos started at 5 mg daily      4-wk f/u echo (12/13/23):  EF 61-65%; VLVOT 10 mmHg  Camzyos decreased to 2.5 mg daily     8-wk f/u echo (1/10/24):  EF 67.9%; VLVOT 5 mmHg  Camzyos STOPPED     12-wk f/u echo (2/7/24):  EF 70.9%; VLVOT 12 mmHg  Camzyos restarted at 2.5 mg daily     Maintenance Phase:        4-wk f/u echo (3/6/24):  EF 68.9 %: VLVOT 7 mmHg  Camzyos continued at 2.5 mg daily       8-wk repeat echo (5/24/24):    EF 70.6%; VLVOT 11.6 mmHg    Camzyos continued at 2.5 mg daily     12-wk f/u echo (8/27/24):  EF 69.3%; VLVOT 10 mmHg  Camzyos continued at 2.5 mg daily     12-wk f/u echo (11/19/24):  EF 55-60%; VLVOT 5 mmHg  Camzyos continued at 2.5 mg daily     12-wk f/u echo (2/11/25):  EF 63%; VLVOT 10 mmHg  Camzyos continued at 2.5mg daily    12-wk f/u echo (5/5/2025):  EF 61.2%; VLVOT 9 mmHg      Review of Systems - Review of Systems   Constitutional: Negative for weight gain and weight loss.   Cardiovascular:  Negative for chest pain, dyspnea on exertion, leg swelling, orthopnea, paroxysmal nocturnal dyspnea and syncope.   Respiratory:  Negative for cough and shortness of breath.    Gastrointestinal:  Negative for bloating.   Neurological:  Negative for dizziness.          Patient Active Problem List   Diagnosis    HOCM (hypertrophic  obstructive cardiomyopathy)    Essential hypertension    Hypercholesterolemia    Brain tumor    Cerebral edema    Pulmonary hypertension     family history includes Cancer in his father and mother; Heart disease in his brother and father; Lupus in his mother.   reports that he quit smoking about 19 years ago. His smoking use included cigarettes. He started smoking about 38 years ago. He has a 19 pack-year smoking history. He has been exposed to tobacco smoke. He has never used smokeless tobacco. He reports current alcohol use of about 1.0 standard drink of alcohol per week. He reports that he does not use drugs.  No Known Allergies  Physical Activity: Not on file          Current Outpatient Medications:     atorvastatin (LIPITOR) 40 MG tablet, Take 1 tablet by mouth Daily., Disp: 90 tablet, Rfl: 3    divalproex (DEPAKOTE) 250 MG DR tablet, TAKE 3 TABLETS BY MOUTH TWICE A DAY DO NOT CRUSH, CHEW OR SPLIT, Disp: , Rfl:     escitalopram (LEXAPRO) 10 MG tablet, TAKE 1 TABLET BY MOUTH EVERY DAY, Disp: 90 tablet, Rfl: 2    icosapent ethyl (Vascepa) 1 g capsule capsule, Vascepa 2 Grams BID. (Fill for brand name only), Disp: 120 capsule, Rfl: 1    levETIRAcetam (KEPPRA) 500 MG tablet, Take 2 tablets by mouth 2 (Two) Times a Day., Disp: , Rfl:     LORazepam (ATIVAN) 0.5 MG tablet, Take 1 tablet by mouth As Needed for Anxiety., Disp: , Rfl:     Mavacamten 2.5 MG capsule, Take 2.5 mg by mouth Daily., Disp: 30 capsule, Rfl: 2    metoprolol succinate XL (TOPROL-XL) 50 MG 24 hr tablet, TAKE 1 TABLET DAILY, Disp: 90 tablet, Rfl: 3    multivitamin with minerals tablet tablet, Take 1 tablet by mouth Daily., Disp: , Rfl:     Nayzilam 5 MG/0.1ML solution, 1 spray into the nostril(s) as directed by provider., Disp: , Rfl:     pantoprazole (Protonix) 40 MG EC tablet, Take 1 tablet by mouth Daily., Disp: , Rfl:     Current Facility-Administered Medications:     nitroglycerin (NITROSTAT) SL tablet 0.4 mg, 0.4 mg, Sublingual, Q5 Min PRN,  Max Husain MD    sodium chloride 0.9 % flush 10 mL, 10 mL, Intravenous, PRN, Max Husain MD    Objective   Vital Sign Review:   Vitals:    05/05/25 1011   BP: 152/97   Pulse: 63   SpO2: 97%     Body mass index is 27.87 kg/m².      05/05/25  1011   Weight: 98.5 kg (217 lb 3.2 oz)     Physical Exam:  Constitutional:       Appearance: Normal and healthy appearance.   Neck:      Vascular: No carotid bruit or JVD. JVD normal.   Pulmonary:      Effort: Pulmonary effort is normal.      Breath sounds: Normal breath sounds.   Cardiovascular:      PMI at left midclavicular line. Normal rate. Regular rhythm.   Pulses:     Intact distal pulses.   Edema:     Peripheral edema absent.   Abdominal:      Palpations: Abdomen is soft.      Tenderness: There is no abdominal tenderness.   Skin:     General: Skin is warm and dry.   Neurological:      General: No focal deficit present.      Mental Status: Alert and oriented to person, place and time.   Psychiatric:         Behavior: Behavior is cooperative.          DATA REVIEWED:   EKG:      ---------------------------------------------------  ECHO:    Results for orders placed during the hospital encounter of 05/05/25    Adult Transthoracic Echo Limited W/ Cont if Necessary Per Protocol    Interpretation Summary    Limited echocardiogram mavacamten surveillance    Left ventricular systolic function is normal. Calculated left ventricular EF = 61.2%. Normal global longitudinal LV strain (GLS) = -19.0%. Normal left ventricular cavity size noted. Left ventricular wall thickness is consistent with mild septal asymmetric hypertrophy. Left ventricular mid cavitary gradient noted to be 9 mmHg.    Normal right ventricular cavity size, systolic function and septal motion noted. Right ventricular wall thickness is consistent with mild hypertrophy.          -----------------------------------------------------  RHC/LHC    No results found for this or any previous  "visit.      ---------------------------------------------------------------------------    CT:   No radiology results for the last 30 days.        --------------------------------------------------------------------------------------------------------------    Laboratory evaluations:  Renal Function: CrCl cannot be calculated (Patient's most recent lab result is older than the maximum 30 days allowed.).    Lab Results   Component Value Date    GLUCOSE 81 01/28/2025    BUN 13 01/28/2025    CREATININE 1.08 01/28/2025    EGFRIFNONA 75 09/14/2021    BCR 12.0 01/28/2025    K 4.9 01/28/2025    CO2 29.0 01/28/2025    CALCIUM 9.4 01/28/2025    ALBUMIN 4.2 01/28/2025    AST 31 01/28/2025    ALT 53 (H) 01/28/2025     Lab Results   Component Value Date    WBC 8.94 01/28/2025    HGB 14.7 01/28/2025    HCT 44.6 01/28/2025    MCV 89.4 01/28/2025     01/28/2025     No results found for: \"HGBA1C\"  No results found for: \"HGBA1C\"  Lab Results   Component Value Date    CREATININE 1.08 01/28/2025     No results found for: \"IRON\", \"TIBC\", \"FERRITIN\"    Result Review:  I have personally reviewed the results from the time of this admission to 5/5/2025 10:25 EDT and agree with these findings:  [x]  Laboratory list / accordion  []  Microbiology  [x]  Radiology  [x]  EKG/Telemetry   [x]  Cardiology/Vascular   []  Pathology  [x]  Old records  []  Other:        Assessment & Plan      1. HOCM (hypertrophic obstructive cardiomyopathy)        HOCM  Patient continues to tolerate Camzyos and symptoms have improved  12-wk repeat 2D TTE today 5/5/2025 shows LVEF 61.2%, V LVOT 9 mmHg  Per dosing algorithm below, Continue Camzyos 2.5 mg daily.   Clinical recheck and follow-up in clinic in 3 months  Repeat TTE in 6months--can follow-up with silviano at that visit.  Drug interactions reviewed. No new drug interactions identified. Reminded patient to call before starting any new RX/OTC/herbals/vitamins/supplements.   Camzyos REMS completed  Continue " metoprolol succinate 50 mg by mouth daily  Blood pressure is elevated today. He states normally blood pressures are higher at office visits.  I have asked the patient start checking his blood pressure at home and keep a log.  Encouraged follow-up with Dr. Edmar Mitchell and primary care for further management of blood pressure.          Lifestyle Advice:   - call office if I gain more than 2 pounds in one day or 5 pounds in one week   - keep legs up while sitting   - use salt in moderation   - watch for swelling in feet, ankles and legs every day   - weigh myself daily   -call for concerning s/sx   -- activity or exercise based on tolerance encouraged             No follow-ups on file.            Thank you for allowing me to participate in the care of your patient,    Time Spent: I spent 25 minutes caring for Santhosh Pope  on this date of service. This time includes time spent by me in the following activities: preparing for the visit, reviewing tests, performing a medically appropriate examination and/or evaluations, counseling and educating the patient/family/caregiver, ordering medications, tests, or procedures, documenting information in the medical record, and independently interpreting results and communicating that information with the patient/family/caregiver.        Marie Ferrer, APRN  05/05/25  10:14 EDT      **Estefany Disclaimer:**  Much of this encounter note is an electronic transcription/translation of spoken language to printed text. The electronic translation of spoken language may permit erroneous, or at times, nonsensical words or phrases to be inadvertently transcribed. Although I have reviewed the note for such errors, some may still exist.    The information in this note was reviewed and updated during the visit to be as accurate as possible. As many patients have chronic medical problems, many of their individual problems and ongoing plans do not change significantly from visit to visit.   This information is correct and is consistent with my treatment plan as of today's visit.

## 2025-05-05 NOTE — LETTER
May 5, 2025     Max Husain MD  [address]  [facility] [address]  [place] [address]    Patient: Santhosh Pope   YOB: 1970   Date of Visit: 5/5/2025     Dear Max Husain MD:       Thank you for referring Santhosh Pope to me for evaluation. Below are the relevant portions of my assessment and plan of care.    If you have questions, please do not hesitate to call me. I look forward to following Santhosh along with you.         Sincerely,        LISSETTE Blair        CC: MD Carissa Lopez, LISSETTE Joseph  05/05/25 1037  Signed    [facility]      History of Present Illness    1. HOCM    Subjective     Santhosh Popeis a 55 y.o. male who presents today for HOCM.     Patient presents to the [facility] for their follow-up visit for Camzyos management. 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 The patient was accompanied by wife, and she was present during the appointment. He continues to tolerate Camzyos well. No side effects reported. Symptoms have improved overall since initiation of Camzyos.       Initiation Phase:     Baseline Echo (10/5/23):   EF 75.5%; VLVOT 57 mmHg   Camzyos started at 5 mg daily      4-wk f/u echo (12/13/23):  EF 61-65%; VLVOT 10 mmHg  Camzyos decreased to 2.5 mg daily     8-wk f/u echo (1/10/24):  EF 67.9%; VLVOT 5 mmHg  Camzyos STOPPED     12-wk f/u echo (2/7/24):  EF 70.9%; VLVOT 12 mmHg  Camzyos restarted at 2.5 mg daily     Maintenance Phase:        4-wk f/u echo (3/6/24):  EF 68.9 %: VLVOT 7 mmHg  Camzyos continued at 2.5 mg daily       8-wk repeat echo (5/24/24):    EF 70.6%; VLVOT 11.6 mmHg    Camzyos continued at 2.5 mg daily     12-wk f/u echo (8/27/24):  EF 69.3%; VLVOT 10 mmHg  Camzyos continued at 2.5 mg daily     12-wk f/u echo (11/19/24):  EF 55-60%; VLVOT 5 mmHg  Camzyos continued at 2.5 mg daily     12-wk f/u echo  (2/11/25):  EF 63%; VLVOT 10 mmHg  Camzyos continued at 2.5mg daily    12-wk f/u echo (5/5/2025):  EF 61.2%; VLVOT 9 mmHg      Review of Systems - Review of Systems   Constitutional: Negative for weight gain and weight loss.   Cardiovascular:  Negative for chest pain, dyspnea on exertion, leg swelling, orthopnea, paroxysmal nocturnal dyspnea and syncope.   Respiratory:  Negative for cough and shortness of breath.    Gastrointestinal:  Negative for bloating.   Neurological:  Negative for dizziness.          Patient Active Problem List   Diagnosis   • HOCM (hypertrophic obstructive cardiomyopathy)   • Essential hypertension   • Hypercholesterolemia   • Brain tumor   • Cerebral edema   • Pulmonary hypertension     family history includes Cancer in his father and mother; Heart disease in his brother and father; Lupus in his mother.   reports that he quit smoking about 19 years ago. His smoking use included cigarettes. He started smoking about 38 years ago. He has a 19 pack-year smoking history. He has been exposed to tobacco smoke. He has never used smokeless tobacco. He reports current alcohol use of about 1.0 standard drink of alcohol per week. He reports that he does not use drugs.  No Known Allergies  Physical Activity: Not on file          Current Outpatient Medications:   •  atorvastatin (LIPITOR) 40 MG tablet, Take 1 tablet by mouth Daily., Disp: 90 tablet, Rfl: 3  •  divalproex (DEPAKOTE) 250 MG DR tablet, TAKE 3 TABLETS BY MOUTH TWICE A DAY DO NOT CRUSH, CHEW OR SPLIT, Disp: , Rfl:   •  escitalopram (LEXAPRO) 10 MG tablet, TAKE 1 TABLET BY MOUTH EVERY DAY, Disp: 90 tablet, Rfl: 2  •  icosapent ethyl (Vascepa) 1 g capsule capsule, Vascepa 2 Grams BID. (Fill for brand name only), Disp: 120 capsule, Rfl: 1  •  levETIRAcetam (KEPPRA) 500 MG tablet, Take 2 tablets by mouth 2 (Two) Times a Day., Disp: , Rfl:   •  LORazepam (ATIVAN) 0.5 MG tablet, Take 1 tablet by mouth As Needed for Anxiety., Disp: , Rfl:   •   Mavacamten 2.5 MG capsule, Take 2.5 mg by mouth Daily., Disp: 30 capsule, Rfl: 2  •  metoprolol succinate XL (TOPROL-XL) 50 MG 24 hr tablet, TAKE 1 TABLET DAILY, Disp: 90 tablet, Rfl: 3  •  multivitamin with minerals tablet tablet, Take 1 tablet by mouth Daily., Disp: , Rfl:   •  Nayzilam 5 MG/0.1ML solution, 1 spray into the nostril(s) as directed by provider., Disp: , Rfl:   •  pantoprazole (Protonix) 40 MG EC tablet, Take 1 tablet by mouth Daily., Disp: , Rfl:     Current Facility-Administered Medications:   •  nitroglycerin (NITROSTAT) SL tablet 0.4 mg, 0.4 mg, Sublingual, Q5 Min PRN, Max Husain MD  •  sodium chloride 0.9 % flush 10 mL, 10 mL, Intravenous, PRN, Max Husain MD    Objective  Vital Sign Review:   Vitals:    05/05/25 1011   BP: 152/97   Pulse: 63   SpO2: 97%     Body mass index is 27.87 kg/m².      05/05/25  1011   Weight: 98.5 kg (217 lb 3.2 oz)     Physical Exam:  Constitutional:       Appearance: Normal and healthy appearance.   Neck:      Vascular: No carotid bruit or JVD. JVD normal.   Pulmonary:      Effort: Pulmonary effort is normal.      Breath sounds: Normal breath sounds.   Cardiovascular:      PMI at left midclavicular line. Normal rate. Regular rhythm.   Pulses:     Intact distal pulses.   Edema:     Peripheral edema absent.   Abdominal:      Palpations: Abdomen is soft.      Tenderness: There is no abdominal tenderness.   Skin:     General: Skin is warm and dry.   Neurological:      General: No focal deficit present.      Mental Status: Alert and oriented to person, place and time.   Psychiatric:         Behavior: Behavior is cooperative.          DATA REVIEWED:   EKG:      ---------------------------------------------------  ECHO:    Results for orders placed during the hospital encounter of 05/05/25    Adult Transthoracic Echo Limited W/ Cont if Necessary Per Protocol    Interpretation Summary  •  Limited echocardiogram mavacamten surveillance  •  Left  "ventricular systolic function is normal. Calculated left ventricular EF = 61.2%. Normal global longitudinal LV strain (GLS) = -19.0%. Normal left ventricular cavity size noted. Left ventricular wall thickness is consistent with mild septal asymmetric hypertrophy. Left ventricular mid cavitary gradient noted to be 9 mmHg.  •  Normal right ventricular cavity size, systolic function and septal motion noted. Right ventricular wall thickness is consistent with mild hypertrophy.          -----------------------------------------------------  RHC/LHC    No results found for this or any previous visit.      ---------------------------------------------------------------------------    CT:   No radiology results for the last 30 days.        --------------------------------------------------------------------------------------------------------------    Laboratory evaluations:  Renal Function: CrCl cannot be calculated (Patient's most recent lab result is older than the maximum 30 days allowed.).    Lab Results   Component Value Date    GLUCOSE 81 01/28/2025    BUN 13 01/28/2025    CREATININE 1.08 01/28/2025    EGFRIFNONA 75 09/14/2021    BCR 12.0 01/28/2025    K 4.9 01/28/2025    CO2 29.0 01/28/2025    CALCIUM 9.4 01/28/2025    ALBUMIN 4.2 01/28/2025    AST 31 01/28/2025    ALT 53 (H) 01/28/2025     Lab Results   Component Value Date    WBC 8.94 01/28/2025    HGB 14.7 01/28/2025    HCT 44.6 01/28/2025    MCV 89.4 01/28/2025     01/28/2025     No results found for: \"HGBA1C\"  No results found for: \"HGBA1C\"  Lab Results   Component Value Date    CREATININE 1.08 01/28/2025     No results found for: \"IRON\", \"TIBC\", \"FERRITIN\"    Result Review:  I have personally reviewed the results from the time of this admission to 5/5/2025 10:25 EDT and agree with these findings:  [x]  Laboratory list / accordion  []  Microbiology  [x]  Radiology  [x]  EKG/Telemetry   [x]  Cardiology/Vascular   []  Pathology  [x]  Old records  []  " Other:        Assessment & Plan     1. HOCM (hypertrophic obstructive cardiomyopathy)        HOCM  Patient continues to tolerate Camzyos and symptoms have improved  12-wk repeat 2D TTE today 5/5/2025 shows LVEF 61.2%, V LVOT 9 mmHg  Per dosing algorithm below, Continue Camzyos 2.5 mg daily.   Clinical recheck and follow-up in clinic in 3 months  Repeat TTE in 6months--can follow-up with silviano at that visit.  Drug interactions reviewed. No new drug interactions identified. Reminded patient to call before starting any new RX/OTC/herbals/vitamins/supplements.   Camzyos REMS completed  Continue metoprolol succinate 50 mg by mouth daily  Blood pressure is elevated today. He states normally blood pressures are higher at office visits.  I have asked the patient start checking his blood pressure at home and keep a log.  Encouraged follow-up with Dr. Edmar Mitchell and primary care for further management of blood pressure.          Lifestyle Advice:   - call office if I gain more than 2 pounds in one day or 5 pounds in one week   - keep legs up while sitting   - use salt in moderation   - watch for swelling in feet, ankles and legs every day   - weigh myself daily   -call for concerning s/sx   -- activity or exercise based on tolerance encouraged             No follow-ups on file.            Thank you for allowing me to participate in the care of your patient,    Time Spent: I spent 25 minutes caring for Santhosh Pope  on this date of service. This time includes time spent by me in the following activities: preparing for the visit, reviewing tests, performing a medically appropriate examination and/or evaluations, counseling and educating the patient/family/caregiver, ordering medications, tests, or procedures, documenting information in the medical record, and independently interpreting results and communicating that information with the patient/family/caregiver.        Marie Ferrer, LISSETTE  05/05/25  10:14  EDT      **Dragon Disclaimer:**  Much of this encounter note is an electronic transcription/translation of spoken language to printed text. The electronic translation of spoken language may permit erroneous, or at times, nonsensical words or phrases to be inadvertently transcribed. Although I have reviewed the note for such errors, some may still exist.    The information in this note was reviewed and updated during the visit to be as accurate as possible. As many patients have chronic medical problems, many of their individual problems and ongoing plans do not change significantly from visit to visit.  This information is correct and is consistent with my treatment plan as of today's visit.

## 2025-05-05 NOTE — ADDENDUM NOTE
Encounter addended by: Prabha Foy MA on: 5/5/2025 11:40 AM   Actions taken: Charge Capture section accepted

## 2025-05-22 ENCOUNTER — TRANSCRIBE ORDERS (OUTPATIENT)
Dept: ADMINISTRATIVE | Facility: HOSPITAL | Age: 55
End: 2025-05-22
Payer: MEDICARE

## 2025-05-22 ENCOUNTER — LAB (OUTPATIENT)
Dept: LAB | Facility: HOSPITAL | Age: 55
End: 2025-05-22
Payer: MEDICARE

## 2025-05-22 DIAGNOSIS — G40.209 COMPLEX PARTIAL SEIZURES WITH CONSCIOUSNESS IMPAIRED: Primary | ICD-10-CM

## 2025-05-22 DIAGNOSIS — G40.209 COMPLEX PARTIAL SEIZURES WITH CONSCIOUSNESS IMPAIRED: ICD-10-CM

## 2025-05-22 LAB
ALBUMIN SERPL-MCNC: 4.3 G/DL (ref 3.5–5.2)
ALBUMIN/GLOB SERPL: 1.8 G/DL
ALP SERPL-CCNC: 76 U/L (ref 39–117)
ALT SERPL W P-5'-P-CCNC: 91 U/L (ref 1–41)
ANION GAP SERPL CALCULATED.3IONS-SCNC: 15.8 MMOL/L (ref 5–15)
AST SERPL-CCNC: 47 U/L (ref 1–40)
BASOPHILS # BLD AUTO: 0.04 10*3/MM3 (ref 0–0.2)
BASOPHILS NFR BLD AUTO: 0.4 % (ref 0–1.5)
BILIRUB SERPL-MCNC: 0.2 MG/DL (ref 0–1.2)
BUN SERPL-MCNC: 15 MG/DL (ref 6–20)
BUN/CREAT SERPL: 12.6 (ref 7–25)
CALCIUM SPEC-SCNC: 9.2 MG/DL (ref 8.6–10.5)
CHLORIDE SERPL-SCNC: 103 MMOL/L (ref 98–107)
CO2 SERPL-SCNC: 27.2 MMOL/L (ref 22–29)
CREAT SERPL-MCNC: 1.19 MG/DL (ref 0.76–1.27)
DEPRECATED RDW RBC AUTO: 43 FL (ref 37–54)
EGFRCR SERPLBLD CKD-EPI 2021: 71.7 ML/MIN/1.73
EOSINOPHIL # BLD AUTO: 0.21 10*3/MM3 (ref 0–0.4)
EOSINOPHIL NFR BLD AUTO: 2.3 % (ref 0.3–6.2)
ERYTHROCYTE [DISTWIDTH] IN BLOOD BY AUTOMATED COUNT: 13.1 % (ref 12.3–15.4)
GLOBULIN UR ELPH-MCNC: 2.4 GM/DL
GLUCOSE SERPL-MCNC: 89 MG/DL (ref 65–99)
HCT VFR BLD AUTO: 43.6 % (ref 37.5–51)
HGB BLD-MCNC: 14.6 G/DL (ref 13–17.7)
IMM GRANULOCYTES # BLD AUTO: 0.03 10*3/MM3 (ref 0–0.05)
IMM GRANULOCYTES NFR BLD AUTO: 0.3 % (ref 0–0.5)
LYMPHOCYTES # BLD AUTO: 2.64 10*3/MM3 (ref 0.7–3.1)
LYMPHOCYTES NFR BLD AUTO: 29.1 % (ref 19.6–45.3)
MCH RBC QN AUTO: 30.1 PG (ref 26.6–33)
MCHC RBC AUTO-ENTMCNC: 33.5 G/DL (ref 31.5–35.7)
MCV RBC AUTO: 89.9 FL (ref 79–97)
MONOCYTES # BLD AUTO: 0.74 10*3/MM3 (ref 0.1–0.9)
MONOCYTES NFR BLD AUTO: 8.2 % (ref 5–12)
NEUTROPHILS NFR BLD AUTO: 5.4 10*3/MM3 (ref 1.7–7)
NEUTROPHILS NFR BLD AUTO: 59.7 % (ref 42.7–76)
NRBC BLD AUTO-RTO: 0 /100 WBC (ref 0–0.2)
PLATELET # BLD AUTO: 274 10*3/MM3 (ref 140–450)
PMV BLD AUTO: 10.5 FL (ref 6–12)
POTASSIUM SERPL-SCNC: 4.3 MMOL/L (ref 3.5–5.2)
PROT SERPL-MCNC: 6.7 G/DL (ref 6–8.5)
RBC # BLD AUTO: 4.85 10*6/MM3 (ref 4.14–5.8)
SODIUM SERPL-SCNC: 146 MMOL/L (ref 136–145)
VALPROATE SERPL-MCNC: 25 MCG/ML (ref 50–125)
WBC NRBC COR # BLD AUTO: 9.06 10*3/MM3 (ref 3.4–10.8)

## 2025-05-22 PROCEDURE — 80164 ASSAY DIPROPYLACETIC ACD TOT: CPT

## 2025-05-22 PROCEDURE — 80053 COMPREHEN METABOLIC PANEL: CPT

## 2025-05-22 PROCEDURE — 36415 COLL VENOUS BLD VENIPUNCTURE: CPT

## 2025-05-22 PROCEDURE — 85025 COMPLETE CBC W/AUTO DIFF WBC: CPT

## 2025-06-20 ENCOUNTER — LAB (OUTPATIENT)
Dept: LAB | Facility: HOSPITAL | Age: 55
End: 2025-06-20
Payer: MEDICARE

## 2025-06-20 ENCOUNTER — TRANSCRIBE ORDERS (OUTPATIENT)
Dept: ADMINISTRATIVE | Facility: HOSPITAL | Age: 55
End: 2025-06-20
Payer: MEDICARE

## 2025-06-20 DIAGNOSIS — G40.209 SEIZURE DISORDER, COMPLEX PARTIAL: ICD-10-CM

## 2025-06-20 DIAGNOSIS — G40.209 SEIZURE DISORDER, COMPLEX PARTIAL: Primary | ICD-10-CM

## 2025-06-20 LAB
ALBUMIN SERPL-MCNC: 4.2 G/DL (ref 3.5–5.2)
ALP SERPL-CCNC: 68 U/L (ref 39–117)
ALT SERPL W P-5'-P-CCNC: 76 U/L (ref 1–41)
AST SERPL-CCNC: 42 U/L (ref 1–40)
BILIRUB CONJ SERPL-MCNC: 0.1 MG/DL (ref 0–0.3)
BILIRUB INDIRECT SERPL-MCNC: 0.2 MG/DL
BILIRUB SERPL-MCNC: 0.3 MG/DL (ref 0–1.2)
PROT SERPL-MCNC: 6.5 G/DL (ref 6–8.5)
VALPROATE SERPL-MCNC: 29 MCG/ML (ref 50–125)

## 2025-06-20 PROCEDURE — 80164 ASSAY DIPROPYLACETIC ACD TOT: CPT

## 2025-06-20 PROCEDURE — 80076 HEPATIC FUNCTION PANEL: CPT

## 2025-06-20 PROCEDURE — 36415 COLL VENOUS BLD VENIPUNCTURE: CPT

## 2025-07-06 DIAGNOSIS — F41.1 GENERALIZED ANXIETY DISORDER: ICD-10-CM

## 2025-07-07 RX ORDER — ESCITALOPRAM OXALATE 10 MG/1
10 TABLET ORAL DAILY
Qty: 90 TABLET | Refills: 2 | Status: SHIPPED | OUTPATIENT
Start: 2025-07-07

## 2025-07-24 RX ORDER — MAVACAMTEN 2.5 MG/1
1 CAPSULE, GELATIN COATED ORAL DAILY
Qty: 30 CAPSULE | Refills: 2 | Status: SHIPPED | OUTPATIENT
Start: 2025-07-24

## 2025-08-04 ENCOUNTER — HOSPITAL ENCOUNTER (OUTPATIENT)
Dept: CARDIOLOGY | Facility: HOSPITAL | Age: 55
Discharge: HOME | End: 2025-08-04
Admitting: INTERNAL MEDICINE
Payer: MEDICARE

## 2025-08-04 VITALS
HEART RATE: 56 BPM | SYSTOLIC BLOOD PRESSURE: 144 MMHG | WEIGHT: 217.4 LBS | HEIGHT: 74 IN | DIASTOLIC BLOOD PRESSURE: 93 MMHG | OXYGEN SATURATION: 97 % | BODY MASS INDEX: 27.9 KG/M2

## 2025-08-04 DIAGNOSIS — F41.1 GENERALIZED ANXIETY DISORDER: ICD-10-CM

## 2025-08-04 DIAGNOSIS — I27.20 PULMONARY HYPERTENSION: Primary | ICD-10-CM

## 2025-08-04 DIAGNOSIS — I42.1 HOCM (HYPERTROPHIC OBSTRUCTIVE CARDIOMYOPATHY): ICD-10-CM

## 2025-08-04 DIAGNOSIS — R00.2 PALPITATIONS: ICD-10-CM

## 2025-08-04 PROCEDURE — G0463 HOSPITAL OUTPT CLINIC VISIT: HCPCS

## 2025-08-04 PROCEDURE — 99214 OFFICE O/P EST MOD 30 MIN: CPT | Performed by: INTERNAL MEDICINE

## 2025-08-05 ENCOUNTER — OFFICE VISIT (OUTPATIENT)
Age: 55
End: 2025-08-05
Payer: MEDICARE

## 2025-08-05 VITALS
SYSTOLIC BLOOD PRESSURE: 140 MMHG | HEART RATE: 68 BPM | DIASTOLIC BLOOD PRESSURE: 90 MMHG | TEMPERATURE: 97.1 F | WEIGHT: 218 LBS | BODY MASS INDEX: 27.98 KG/M2 | OXYGEN SATURATION: 95 % | HEIGHT: 74 IN

## 2025-08-05 DIAGNOSIS — K21.9 GASTROESOPHAGEAL REFLUX DISEASE WITHOUT ESOPHAGITIS: ICD-10-CM

## 2025-08-05 DIAGNOSIS — Z00.01 ENCOUNTER FOR ROUTINE ADULT HEALTH EXAMINATION WITH ABNORMAL FINDINGS: Primary | ICD-10-CM

## 2025-08-05 DIAGNOSIS — I10 ESSENTIAL HYPERTENSION: ICD-10-CM

## 2025-08-05 DIAGNOSIS — R73.03 PREDIABETES: ICD-10-CM

## 2025-08-05 DIAGNOSIS — I42.1 HOCM (HYPERTROPHIC OBSTRUCTIVE CARDIOMYOPATHY): ICD-10-CM

## 2025-08-05 DIAGNOSIS — F32.0 MILD MAJOR DEPRESSION: ICD-10-CM

## 2025-08-05 DIAGNOSIS — I27.20 PULMONARY HYPERTENSION: ICD-10-CM

## 2025-08-05 DIAGNOSIS — E78.2 MIXED HYPERLIPIDEMIA: ICD-10-CM

## 2025-08-05 PROCEDURE — 99386 PREV VISIT NEW AGE 40-64: CPT | Performed by: FAMILY MEDICINE

## 2025-08-05 PROCEDURE — 1159F MED LIST DOCD IN RCRD: CPT | Performed by: FAMILY MEDICINE

## 2025-08-05 PROCEDURE — 1160F RVW MEDS BY RX/DR IN RCRD: CPT | Performed by: FAMILY MEDICINE

## 2025-08-05 PROCEDURE — 3077F SYST BP >= 140 MM HG: CPT | Performed by: FAMILY MEDICINE

## 2025-08-05 PROCEDURE — 3080F DIAST BP >= 90 MM HG: CPT | Performed by: FAMILY MEDICINE

## 2025-08-05 PROCEDURE — 1126F AMNT PAIN NOTED NONE PRSNT: CPT | Performed by: FAMILY MEDICINE

## 2025-08-05 RX ORDER — PANTOPRAZOLE SODIUM 40 MG/1
40 TABLET, DELAYED RELEASE ORAL DAILY
Start: 2025-08-05

## 2025-08-05 RX ORDER — ATORVASTATIN CALCIUM 40 MG/1
40 TABLET, FILM COATED ORAL DAILY
Qty: 90 TABLET | Refills: 2 | Status: SHIPPED | OUTPATIENT
Start: 2025-08-05

## 2025-08-05 RX ORDER — ESCITALOPRAM OXALATE 20 MG/1
20 TABLET ORAL DAILY
Qty: 90 TABLET | Refills: 3 | Status: SHIPPED | OUTPATIENT
Start: 2025-08-05

## 2025-08-05 RX ORDER — ASPIRIN 81 MG/1
81 TABLET, CHEWABLE ORAL DAILY
COMMUNITY

## 2025-08-05 RX ORDER — ESCITALOPRAM OXALATE 10 MG/1
10 TABLET ORAL DAILY
Qty: 90 TABLET | Refills: 2 | Status: SHIPPED | OUTPATIENT
Start: 2025-08-05 | End: 2025-08-05

## 2025-08-06 LAB
ALBUMIN SERPL-MCNC: 4.4 G/DL (ref 3.8–4.9)
ALP SERPL-CCNC: 74 IU/L (ref 44–121)
ALT SERPL-CCNC: 78 IU/L (ref 0–44)
AST SERPL-CCNC: 48 IU/L (ref 0–40)
BASOPHILS # BLD AUTO: 0.1 X10E3/UL (ref 0–0.2)
BASOPHILS NFR BLD AUTO: 1 %
BILIRUB DIRECT SERPL-MCNC: 0.09 MG/DL (ref 0–0.4)
BILIRUB SERPL-MCNC: 0.3 MG/DL (ref 0–1.2)
BUN SERPL-MCNC: 18 MG/DL (ref 6–24)
BUN/CREAT SERPL: 14 (ref 9–20)
CALCIUM SERPL-MCNC: 9.1 MG/DL (ref 8.7–10.2)
CHLORIDE SERPL-SCNC: 107 MMOL/L (ref 96–106)
CHOLEST SERPL-MCNC: 148 MG/DL (ref 100–199)
CO2 SERPL-SCNC: 23 MMOL/L (ref 20–29)
CREAT SERPL-MCNC: 1.28 MG/DL (ref 0.76–1.27)
EGFRCR SERPLBLD CKD-EPI 2021: 66 ML/MIN/1.73
EOSINOPHIL # BLD AUTO: 0.2 X10E3/UL (ref 0–0.4)
EOSINOPHIL NFR BLD AUTO: 2 %
ERYTHROCYTE [DISTWIDTH] IN BLOOD BY AUTOMATED COUNT: 13.1 % (ref 11.6–15.4)
GLUCOSE SERPL-MCNC: 88 MG/DL (ref 70–99)
HBA1C MFR BLD: 5.9 % (ref 4.8–5.6)
HCT VFR BLD AUTO: 45.6 % (ref 37.5–51)
HDLC SERPL-MCNC: 29 MG/DL
HGB BLD-MCNC: 14.4 G/DL (ref 13–17.7)
IMM GRANULOCYTES # BLD AUTO: 0 X10E3/UL (ref 0–0.1)
IMM GRANULOCYTES NFR BLD AUTO: 0 %
LDLC SERPL CALC-MCNC: 90 MG/DL (ref 0–99)
LYMPHOCYTES # BLD AUTO: 3.1 X10E3/UL (ref 0.7–3.1)
LYMPHOCYTES NFR BLD AUTO: 33 %
MCH RBC QN AUTO: 29.6 PG (ref 26.6–33)
MCHC RBC AUTO-ENTMCNC: 31.6 G/DL (ref 31.5–35.7)
MCV RBC AUTO: 94 FL (ref 79–97)
MONOCYTES # BLD AUTO: 0.9 X10E3/UL (ref 0.1–0.9)
MONOCYTES NFR BLD AUTO: 9 %
NEUTROPHILS # BLD AUTO: 5 X10E3/UL (ref 1.4–7)
NEUTROPHILS NFR BLD AUTO: 55 %
PLATELET # BLD AUTO: 256 X10E3/UL (ref 150–450)
POTASSIUM SERPL-SCNC: 5.2 MMOL/L (ref 3.5–5.2)
RBC # BLD AUTO: 4.86 X10E6/UL (ref 4.14–5.8)
SODIUM SERPL-SCNC: 149 MMOL/L (ref 134–144)
TRIGL SERPL-MCNC: 166 MG/DL (ref 0–149)
VLDLC SERPL CALC-MCNC: 29 MG/DL (ref 5–40)
WBC # BLD AUTO: 9.3 X10E3/UL (ref 3.4–10.8)

## 2025-08-06 RX ORDER — ICOSAPENT ETHYL 1 G/1
CAPSULE ORAL
Qty: 120 CAPSULE | Refills: 1 | Status: SHIPPED | OUTPATIENT
Start: 2025-08-06

## 2025-08-07 ENCOUNTER — PATIENT MESSAGE (OUTPATIENT)
Age: 55
End: 2025-08-07
Payer: MEDICARE

## 2025-08-08 RX ORDER — PANTOPRAZOLE SODIUM 40 MG/1
40 TABLET, DELAYED RELEASE ORAL DAILY
Qty: 90 TABLET | Refills: 3
Start: 2025-08-08

## 2025-08-12 RX ORDER — PANTOPRAZOLE SODIUM 40 MG/1
40 TABLET, DELAYED RELEASE ORAL DAILY
Start: 2025-08-12 | End: 2025-08-15 | Stop reason: SDUPTHER